# Patient Record
Sex: FEMALE | Race: BLACK OR AFRICAN AMERICAN | Employment: OTHER | URBAN - METROPOLITAN AREA
[De-identification: names, ages, dates, MRNs, and addresses within clinical notes are randomized per-mention and may not be internally consistent; named-entity substitution may affect disease eponyms.]

---

## 2017-09-07 ENCOUNTER — TELEPHONE (OUTPATIENT)
Dept: INTERNAL MEDICINE CLINIC | Facility: CLINIC | Age: 72
End: 2017-09-07

## 2017-09-07 NOTE — TELEPHONE ENCOUNTER
LOV with Dr. Ok Harry 2015 - Patient Outreach for 2017 requested patient contact our office to verify she still has [address]; advised we can remove from call list as well

## 2018-09-17 ENCOUNTER — TELEPHONE (OUTPATIENT)
Dept: INTERNAL MEDICINE CLINIC | Facility: CLINIC | Age: 73
End: 2018-09-17

## 2018-09-17 NOTE — TELEPHONE ENCOUNTER
Please place the referral and I will sign it. She will not receive any future referrals from me until she sees me.   TY

## 2018-09-17 NOTE — TELEPHONE ENCOUNTER
Patient called to schedule annual physical  Future Appointments   Date Time Provider Timothy Lopez   10/3/2018  1:45 PM Tiffany Sanabria MD EM[facility] 2015 with Dr. Kelly Abebe    Patient is requesting referral for cardiologist Dr. Jake Elias

## 2019-08-30 ENCOUNTER — TELEPHONE (OUTPATIENT)
Dept: INTERNAL MEDICINE CLINIC | Facility: CLINIC | Age: 74
End: 2019-08-30

## 2019-08-30 NOTE — TELEPHONE ENCOUNTER
LVM for pt to call office  Testing results received but pt hasnt been seen by Dr. Lennette Bosworth since 1/20/2015 -    is curious if pt has other PCP - if not,  would like her to be asked if she would like re-establish with her and schedule CPX.

## 2021-01-01 ENCOUNTER — EXTERNAL RECORD (OUTPATIENT)
Dept: HEALTH INFORMATION MANAGEMENT | Facility: OTHER | Age: 76
End: 2021-01-01

## 2021-03-12 DIAGNOSIS — Z23 NEED FOR VACCINATION: ICD-10-CM

## 2021-04-13 ENCOUNTER — APPOINTMENT (OUTPATIENT)
Dept: GENERAL RADIOLOGY | Facility: HOSPITAL | Age: 76
DRG: 286 | End: 2021-04-13
Attending: EMERGENCY MEDICINE
Payer: MEDICARE

## 2021-04-13 ENCOUNTER — HOSPITAL ENCOUNTER (INPATIENT)
Facility: HOSPITAL | Age: 76
LOS: 9 days | Discharge: HOME HEALTH | DRG: 286 | End: 2021-04-23
Attending: EMERGENCY MEDICINE | Admitting: INTERNAL MEDICINE
Payer: MEDICARE

## 2021-04-13 DIAGNOSIS — I48.11 LONGSTANDING PERSISTENT ATRIAL FIBRILLATION (HCC): ICD-10-CM

## 2021-04-13 DIAGNOSIS — E66.2 CLASS 2 OBESITY WITH ALVEOLAR HYPOVENTILATION, SERIOUS COMORBIDITY, AND BODY MASS INDEX (BMI) OF 35.0 TO 35.9 IN ADULT (HCC): ICD-10-CM

## 2021-04-13 DIAGNOSIS — R79.1 SUPRATHERAPEUTIC INR: ICD-10-CM

## 2021-04-13 DIAGNOSIS — I50.9 ACUTE ON CHRONIC CONGESTIVE HEART FAILURE, UNSPECIFIED HEART FAILURE TYPE (HCC): Primary | ICD-10-CM

## 2021-04-13 DIAGNOSIS — R06.00 DYSPNEA ON EXERTION: ICD-10-CM

## 2021-04-13 DIAGNOSIS — G47.33 OSA (OBSTRUCTIVE SLEEP APNEA): ICD-10-CM

## 2021-04-13 PROBLEM — E87.1 HYPONATREMIA: Status: ACTIVE | Noted: 2021-04-13

## 2021-04-13 PROBLEM — R73.9 HYPERGLYCEMIA: Status: ACTIVE | Noted: 2021-04-13

## 2021-04-13 PROCEDURE — 71045 X-RAY EXAM CHEST 1 VIEW: CPT | Performed by: EMERGENCY MEDICINE

## 2021-04-13 PROCEDURE — 99223 1ST HOSP IP/OBS HIGH 75: CPT | Performed by: INTERNAL MEDICINE

## 2021-04-13 RX ORDER — FUROSEMIDE 10 MG/ML
40 INJECTION INTRAMUSCULAR; INTRAVENOUS ONCE
Status: COMPLETED | OUTPATIENT
Start: 2021-04-13 | End: 2021-04-13

## 2021-04-13 RX ORDER — LISINOPRIL 20 MG/1
30 TABLET ORAL DAILY
Status: ON HOLD | COMMUNITY
End: 2021-04-23

## 2021-04-13 NOTE — ED INITIAL ASSESSMENT (HPI)
PT TO ED FOR ANABELA FEET SWELLING X 6 WEEKS. [facility] SENT TO ED FOR FURTHER EVAL. + SOB X 2 WEEKS WITH EXERTION , DENIES CP, HX OF AFIB, ON BLOOD THINNERS.

## 2021-04-14 ENCOUNTER — APPOINTMENT (OUTPATIENT)
Dept: CV DIAGNOSTICS | Facility: HOSPITAL | Age: 76
DRG: 286 | End: 2021-04-14
Attending: NURSE PRACTITIONER
Payer: MEDICARE

## 2021-04-14 PROBLEM — I50.9 ACUTE ON CHRONIC CONGESTIVE HEART FAILURE, UNSPECIFIED HEART FAILURE TYPE (HCC): Status: ACTIVE | Noted: 2021-04-14

## 2021-04-14 PROBLEM — R79.1 SUPRATHERAPEUTIC INR: Status: ACTIVE | Noted: 2021-04-14

## 2021-04-14 PROCEDURE — 93306 TTE W/DOPPLER COMPLETE: CPT | Performed by: NURSE PRACTITIONER

## 2021-04-14 PROCEDURE — 99232 SBSQ HOSP IP/OBS MODERATE 35: CPT | Performed by: STUDENT IN AN ORGANIZED HEALTH CARE EDUCATION/TRAINING PROGRAM

## 2021-04-14 RX ORDER — POTASSIUM CHLORIDE 20 MEQ/1
40 TABLET, EXTENDED RELEASE ORAL EVERY 4 HOURS
Status: COMPLETED | OUTPATIENT
Start: 2021-04-14 | End: 2021-04-14

## 2021-04-14 RX ORDER — CARVEDILOL 12.5 MG/1
25 TABLET ORAL 2 TIMES DAILY WITH MEALS
Status: DISCONTINUED | OUTPATIENT
Start: 2021-04-14 | End: 2021-04-23

## 2021-04-14 RX ORDER — ACETAMINOPHEN 325 MG/1
650 TABLET ORAL EVERY 6 HOURS PRN
Status: DISCONTINUED | OUTPATIENT
Start: 2021-04-14 | End: 2021-04-23

## 2021-04-14 RX ORDER — FUROSEMIDE 10 MG/ML
20 INJECTION INTRAMUSCULAR; INTRAVENOUS ONCE
Status: COMPLETED | OUTPATIENT
Start: 2021-04-14 | End: 2021-04-14

## 2021-04-14 RX ORDER — LORAZEPAM 0.5 MG/1
0.5 TABLET ORAL 2 TIMES DAILY PRN
Status: DISCONTINUED | OUTPATIENT
Start: 2021-04-14 | End: 2021-04-23

## 2021-04-14 RX ORDER — DIGOXIN 125 MCG
0.12 TABLET ORAL DAILY
Status: DISCONTINUED | OUTPATIENT
Start: 2021-04-14 | End: 2021-04-14

## 2021-04-14 RX ORDER — FUROSEMIDE 40 MG/1
40 TABLET ORAL
Status: DISCONTINUED | OUTPATIENT
Start: 2021-04-14 | End: 2021-04-14

## 2021-04-14 RX ORDER — MELATONIN
3 NIGHTLY PRN
Status: DISCONTINUED | OUTPATIENT
Start: 2021-04-14 | End: 2021-04-23

## 2021-04-14 RX ORDER — PANTOPRAZOLE SODIUM 40 MG/1
40 TABLET, DELAYED RELEASE ORAL
Status: DISCONTINUED | OUTPATIENT
Start: 2021-04-14 | End: 2021-04-23

## 2021-04-14 RX ORDER — ONDANSETRON 2 MG/ML
4 INJECTION INTRAMUSCULAR; INTRAVENOUS EVERY 4 HOURS PRN
Status: ACTIVE | OUTPATIENT
Start: 2021-04-14 | End: 2021-04-14

## 2021-04-14 NOTE — ED PROVIDER NOTES
Patient Seen in: [facility] Emergency Department      History   Patient presents with:  Difficulty Breathing    Stated Complaint: sob, hx of afib, denies palpitations, recent swelling feet, sats 98% room air    HPI/Subjective:   HPI    Patient is a 172.7 cm (5' 8\")   Wt 98 kg   LMP  (LMP Unknown)   SpO2 94%   BMI 32.84 kg/m²       Physical Exam    Vital signs noted. GENERAL: Patient is awake and alert, supine on the cart, in no apparent distress. HEENT: Head is without evidence of trauma.  Aline Lyons following orders were created for panel order CBC WITH DIFFERENTIAL WITH PLATELET.   Procedure                               Abnormality         Status                     ---------                               -----------         ------ recommended. Patient will be admitted to the service of the [facility]. Dr. Dia Drew notified. Cardiology to follow in consultation. Dr. Nelson Johnson notified.     Additional evaluation and intervention will be facilitated as needed, based on the lai

## 2021-04-14 NOTE — PROGRESS NOTES
[facility]  Progress Note     Marce Shipman Patient Status:  Inpatient    1945 MRN [id number]   [facility] 8NE-A Attending Lisa Barrow MD   Hosp Day # 0 PCP None Pcp     Chief Complaint:  Increasing SOB, LE edema   S:  H Epic.  ASSESSMENT / PLAN:   1. Acute right side heart failure   1.  lasix IV BID,    2. Echo  P   3. I/O, daily weight   4. Cardiology eval  Follows w/ Dr Alice Schaeffer    2. Atrial fibrillation on coumadin with supra therapeutic INR  1.  Resume home meds except cou

## 2021-04-14 NOTE — H&P
[facility]  History and Physical     Ginna Duckworth Patient Status:  Emergency    1945 MRN [id number]   Location [address] Attending No att. providers found   [facility] Day [facility] PCP None Pcp     Chief Complaint: SOB, mouth daily. , Disp: , Rfl:   hydrochlorothiazide (HYDRODIURIL) 25 MG Oral Tab, Take 25 mg by mouth daily. , Disp: , Rfl:   Warfarin Sodium (COUMADIN) 5 MG Oral Tab, 4 mg.  , Disp: , Rfl:   ramipril (ALTACE) 2.5 MG Oral Cap, Take 2.5 mg by mouth daily.  (Pa results for input(s): PCT in the last 168 hours. Cardiac  Recent Labs   Lab 04/13/21 2013   TROP <0.045   PBNP 3,305*       Creatinine Kinase  No results for input(s): CK in the last 168 hours.     Inflammatory Markers  No results for input(s): CRP, ROYCE

## 2021-04-14 NOTE — PLAN OF CARE
Received patient from ED. Patient oriented to room. AO x4. Calm, cooperative. Up ad maria. Afib with PVCs on cardiac monitor. Adequate saturation on room air. Lung sounds clear and diminished. Reports shortness of breath with exertion.  Denies chest discomfor

## 2021-04-14 NOTE — CONSULTS
[facility]    Report of Consultation    Vasquez Alejandro Patient Status:  Inpatient    1945 MRN [id number]   [facility] 8NE-A Attending Dk Hendrix MD   Hosp Day # 0 PCP None Pcp     Date of Admission:  2021  Date of Co (Diuretic)  •  LORazepam (ATIVAN) tab 0.5 mg, 0.5 mg, Oral, BID PRN  •  Pantoprazole Sodium (PROTONIX) EC tab 40 mg, 40 mg, Oral, QAM AC    Review of Systems:  All systems were reviewed and are negative except as described above in HPI.     Physical Exam: by (CST): Heather Velez MD on 4/13/2021 at 8:34 PM       Finalized by (CST): Heather Velez MD on 4/13/2021 at 8:34 PM            Labs:   Lab Results   Component Value Date    WBC 3.8 04/14/2021    RBC 4.78 04/14/2021    HGB 12.2 04/14/2021    HCT 40.7 unspecified heart failure type ([address]     Supratherapeutic INR      Elderly female with fluid overload wo any orthopnea, pnd; pointing more towards RHF.  Based on CXR worry about pericardial effusion and in the backdrop of her facial look with temple muscula

## 2021-04-14 NOTE — PLAN OF CARE
Pt A&O x 4, SPO2 96% on RA, A Fib with PVC on tele, up ad maria. Potassium replaced. Cardiology to see, APN notified. Maintained on PO Lasix, fluid restriction 1500 ml/day. Pt denies pain/chest pain/dyspnea at this time, will continue to monitor.      Problem replacement therapy as ordered  Outcome: Progressing

## 2021-04-15 PROCEDURE — 99223 1ST HOSP IP/OBS HIGH 75: CPT | Performed by: INTERNAL MEDICINE

## 2021-04-15 PROCEDURE — 99233 SBSQ HOSP IP/OBS HIGH 50: CPT | Performed by: STUDENT IN AN ORGANIZED HEALTH CARE EDUCATION/TRAINING PROGRAM

## 2021-04-15 RX ORDER — SIMETHICONE 80 MG
80 TABLET,CHEWABLE ORAL 4 TIMES DAILY PRN
Status: DISCONTINUED | OUTPATIENT
Start: 2021-04-15 | End: 2021-04-23

## 2021-04-15 RX ORDER — FUROSEMIDE 10 MG/ML
120 INJECTION INTRAMUSCULAR; INTRAVENOUS ONCE
Status: COMPLETED | OUTPATIENT
Start: 2021-04-15 | End: 2021-04-15

## 2021-04-15 RX ORDER — MAGNESIUM SULFATE HEPTAHYDRATE 40 MG/ML
2 INJECTION, SOLUTION INTRAVENOUS ONCE
Status: COMPLETED | OUTPATIENT
Start: 2021-04-15 | End: 2021-04-15

## 2021-04-15 RX ORDER — IPRATROPIUM BROMIDE AND ALBUTEROL SULFATE 2.5; .5 MG/3ML; MG/3ML
3 SOLUTION RESPIRATORY (INHALATION) EVERY 4 HOURS PRN
Status: DISCONTINUED | OUTPATIENT
Start: 2021-04-15 | End: 2021-04-23

## 2021-04-15 RX ORDER — ERGOCALCIFEROL 1.25 MG/1
50000 CAPSULE ORAL
Status: DISCONTINUED | OUTPATIENT
Start: 2021-04-15 | End: 2021-04-23

## 2021-04-15 RX ORDER — POTASSIUM CHLORIDE 20 MEQ/1
40 TABLET, EXTENDED RELEASE ORAL EVERY 4 HOURS
Status: COMPLETED | OUTPATIENT
Start: 2021-04-15 | End: 2021-04-15

## 2021-04-15 RX ORDER — EPLERENONE 25 MG/1
50 TABLET, FILM COATED ORAL DAILY
Status: DISCONTINUED | OUTPATIENT
Start: 2021-04-16 | End: 2021-04-23

## 2021-04-15 RX ORDER — IPRATROPIUM BROMIDE AND ALBUTEROL SULFATE 2.5; .5 MG/3ML; MG/3ML
3 SOLUTION RESPIRATORY (INHALATION)
Status: DISCONTINUED | OUTPATIENT
Start: 2021-04-15 | End: 2021-04-19

## 2021-04-15 RX ORDER — EPLERENONE 25 MG/1
25 TABLET, FILM COATED ORAL DAILY
Status: DISCONTINUED | OUTPATIENT
Start: 2021-04-15 | End: 2021-04-15

## 2021-04-15 NOTE — CONSULTS
[facility]  Report of Consultation    Zaid Reyna Patient Status:  Inpatient    1945 MRN [id number]   [facility] 8NE-A Attending Pablo Sanchez MD   Hosp Day # 1 PCP None Pcp     Reason for Consultation:  ALEXYS    History of P QAM AC  •  furosemide (LASIX) 250 mg in sodium chloride 0.9% 125 mL infusion, 10 mg/hr, Intravenous, Continuous    Review of Systems:   14 point ROS performed and is negative except as noted in HPI    Vital signs in last 24 hours:  Patient Vitals for the p 3.9 3.4*     --  101  --  101   CO2 28.0  --  32.0  --  33.0*   ALKPHO 84  --   --   --   --    AST 38*  --   --   --   --    ALT 22  --   --   --   --    BILT 1.4  --   --   --   --    TP 7.7  --   --   --   --     < > = values in this interval not

## 2021-04-15 NOTE — CONSULTS
[facility]  Advanced Heart Failure Consultation    Marce Shipman Patient Status:  Inpatient    1945 MRN [id number]   [facility] 8NE-A Attending Lisa Barrow MD   Hosp Day # 1 PCP None Pcp     Reason for Consultation:  Severe f Medical History:   Diagnosis Date   • Atrial fibrillation [facility])    • Unspecified essential hypertension      History reviewed. No pertinent surgical history.   Family History   Problem Relation Age of Onset   • Heart Disorder Father    • Cancer Sister distress. HEENT: No focal deficits. Arcus senilis; PERRLA  Neck: The patient has jugular venous distention to the angle of the jaw in the sitting position with large and variable V waves.   Cardiac: Irregularly irregular rhythm there is an S3 which is mos explained to the patient's children that her current right ventricular failure may have been developing over a relatively long time and that the first order of treatment is to diurese the patient as much as possible and achieve as euvolemic state as possib [address], [address] [facility], [address] [address] [place]  Phone: [phone]  Fax: [phone]  E-mail: [email or web address]    4/15/2021  2:57 PM

## 2021-04-15 NOTE — PROCEDURES
Spirometry Findings:  FEV1 is 0.77L, 39% predicted. FVC is 1.18L, 46% predicted. FEV1/ FVC ratio is 0.65. The flow-volume loop demonstrates an obstructive pattern. Impression:  Spirometry testing suggests both an obstructive and restrictive deficit.

## 2021-04-15 NOTE — PLAN OF CARE
Received bedside report on this Pt., at 1555. Pt., awake, A&Ox4, calm and cooperative. Pt., is in AFib on Tele monitor, sats greater than 92% on RA, denies any pain or distress. Pt., up to the BR with SBA, steady gait noted.  Continues on IV Lasix at 10mg/h

## 2021-04-15 NOTE — PAYOR COMM NOTE
--------------  ADMISSION REVIEW     Payor: Ross Navarro Langlois #:  [id number]  Authorization Number: [id number]    Admit date: 4/14/21  Admit time:  1:08 AM     4/13 Patient Seen in: [facility] Emergency Department    History   Shruthi LUNGS: Lungs are diminished, respirations are unlabored. HEART: Irregularly irregular. There are no rubs or gallops. ABDOMEN: The abdomen is soft, nontender. Bowel sounds present. SKIN: Skin is warm and dry. There are no rashes.    EXTREMITIES: The [facility]. Joaquim Coronado notified. Cardiology to follow in consultation. Dr. Adwoa Palacio notified.     Disposition and Plan   Clinical Impression:  Acute on chronic congestive heart failure, unspecified heart failure typ acute distress. Alert and oriented x 3. SOB with talking   HEENT: Normocephalic atraumatic. Moist mucous membranes. EOM-I. PERRLA. Anicteric. Neck: No lymphadenopathy. No JVD. No carotid bruits. Respiratory: Clear to auscultation bilaterally. No wheezes. vomiting. Feeling significantly improved since arrival after getting diuretics. Daughter at bedside. Known to have chronic A Fib with well controlled heart rate, Htn and moderate obesity.      Current Facility-Administered Medications:   •  carvedilol (CO 4/14/2021 05:32   Cholesterol, Total Latest Ref Range: <200 mg/dL 99   Triglycerides Latest Ref Range: 30 - 149 mg/dL 65   HDL Cholesterol Latest Ref Range: 40 - 59 mg/dL 38 (L)   VLDL Latest Ref Range: 0 - 30 mg/dL 13   NON HDL CHOL Latest Ref Range: <130 3800 ml   Net -3560 ml       Physical Exam:  Blood pressure 102/52, pulse 86, temperature 97.5 °F (36.4 °C), temperature source Oral, resp. rate 18, height 5' 8\" (1.727 m), weight 235 lb 8 oz (106.8 kg), SpO2 96 %.   General: Alert and oriented in no appar  a left pleural effusion.        Lab 04/13/21 2013 04/14/21  1145 04/15/21  0520   WBC 4.3 3.8* 3.7*   HGB 13.6 12.2 12.2   HCT 43.4 40.7 39.8   .0 201.0 178.0      *  --  80  --  87   BUN 16  --  16  --  16   CREATSERUM 0.94  --  1.06*  -- warfarin; INR >7 sec to congested liver. Vit k to bring INR down. Digoxin stopped on admission  H/o Htn  Moderate obesity; ALEXYS protocol ordered by HF consultant.  Also pulm consulted       HOSPITALIST:     :  Feeling better this am- feels less bloated and l RHC when vol status is imporved as still has signif vol overload . Cont education HF   Will need PCP add SW      Estimated date of discharge:  Few days   Discharge is dependent on:  Clinical w/u   At this point Ms. Oswaldo Gambino is expected to be discharge to:

## 2021-04-15 NOTE — PLAN OF CARE
Assumed care at 1900. Pt resting in bed. A&Ox4. Denies pain & sob. O2 sat WNL on RA. A.fib on tele w/ PVCs. Coumadin on hold, INR draw for morning. Lasix gtt infusing at 5mL/hr or 10mg/hr. Good urine output. 2+ edema noted on bilateral lower extremities.  Cayla Denton threatening arrhythmias  - Monitor electrolytes and administer replacement therapy as ordered  Outcome: Progressing

## 2021-04-15 NOTE — PROGRESS NOTES
[facility]  Progress Note     Elena Kamara Patient Status:  Inpatient    1945 MRN [id number]   [facility] 8NE-A Attending Chuy Mcfarland MD   Hosp Day # 1 PCP None Pcp     Chief Complaint:  Increasing SOB, LE edema   S:  F Clearance: 41.7 mL/min (A) (based on SCr of 1.14 mg/dL (H)).   Recent Labs   Lab 04/13/21 2013 04/14/21  0835 04/15/21  0520   PTP 61.4* 63.9* 66.6*   INR 6.95* 7.31* 7.71*     Recent Labs   Lab 04/13/21 2013   TROP <0.045        Imaging: Imaging data rev reviewed  Supratherapeutic INR- no anemia on labs, continue to hold coumadin  Sujit Gomez MD

## 2021-04-15 NOTE — CM/SW NOTE
Met with patient, with daughter and son at bedside to discuss discharge planning per CHF protocol. Patient shared that she resides in a single family senior independent living community in [facility].   Her daughter is about 5 minutes from her mom; son re

## 2021-04-15 NOTE — PROGRESS NOTES
[facility]    Progress Note    Dejan Enrique Patient Status:  Inpatient    1945 MRN [id number]   [facility] 8NE-A Attending Karol Ybarra MD   Hosp Day # 1 PCP None Pcp     Subjective:  No chest pain or shortness of breath.   Fa technically       sufficient to allow evaluation of LV diastolic function. 2.  Ventricular septum: The contour showed diastolic flattening. 3.  Aortic valve: Mild regurgitation. 4.  Mitral valve: Mildly calcified annulus.  Mitral valve demonstrates mi 3.9 3.4 (L)   Chloride Latest Ref Range: 98 - 112 mmol/L 101 101    101   Carbon Dioxide, Total Latest Ref Range: 21.0 - 32.0 mmol/L 28.0 32.0    33.0 (H)   BUN Latest Ref Range: 7 - 18 mg/dL 16 16    16   CREATININE Latest Ref Range: 0.55 - 1.02 mg/dL 0.9

## 2021-04-15 NOTE — PLAN OF CARE
Assume pt care around 0730. Pt denies CP, SOB, dizziness, or lightheadedness. Pt up w/ SBA, continent of b/b. Pt education provided on medication, heart failure,and POC. Pt verbalized understanding. All needs met. Will continue to monitor.   POC: lasix gtt, arrhythmias  - Monitor electrolytes and administer replacement therapy as ordered  Outcome: Progressing

## 2021-04-16 PROCEDURE — 99232 SBSQ HOSP IP/OBS MODERATE 35: CPT | Performed by: STUDENT IN AN ORGANIZED HEALTH CARE EDUCATION/TRAINING PROGRAM

## 2021-04-16 PROCEDURE — 99232 SBSQ HOSP IP/OBS MODERATE 35: CPT | Performed by: NURSE PRACTITIONER

## 2021-04-16 RX ORDER — POTASSIUM CHLORIDE 20 MEQ/1
40 TABLET, EXTENDED RELEASE ORAL ONCE
Status: COMPLETED | OUTPATIENT
Start: 2021-04-16 | End: 2021-04-16

## 2021-04-16 RX ORDER — DIGOXIN 125 MCG
125 TABLET ORAL EVERY OTHER DAY
Status: DISCONTINUED | OUTPATIENT
Start: 2021-04-17 | End: 2021-04-23

## 2021-04-16 RX ORDER — WARFARIN SODIUM 2 MG/1
2 TABLET ORAL
Status: COMPLETED | OUTPATIENT
Start: 2021-04-16 | End: 2021-04-16

## 2021-04-16 NOTE — PROGRESS NOTES
[facility]    Progress Note    Vasquez Alejandro Patient Status:  Inpatient    1945 MRN [id number]   [facility] 8NE-A Attending Dk Hendrix MD   Hosp Day # 2 PCP None Pcp     Subjective:  \" I feel 80% better \"  Abdomen feels le not technically       sufficient to allow evaluation of LV diastolic function. 2.  Ventricular septum: The contour showed diastolic flattening. 3.  Aortic valve: Mild regurgitation. 4.  Mitral valve: Mildly calcified annulus.  Mitral valve demonstrate mmol/L 34.0 (H)   BUN Latest Ref Range: 7 - 18 mg/dL 16   CREATININE Latest Ref Range: 0.55 - 1.02 mg/dL 1.19 (H)         Medications:  [START ON 4/17/2021] digoxin (LANOXIN) tab 125 mcg, 125 mcg, Oral, QOD  ergocalciferol (DRISDOL/VITAMIN D2) cap 50,000 U

## 2021-04-16 NOTE — CDS QUERY
Heart Failure  CLINICAL [place]    Dear Doctor:  Clinical information (provided below) includes a diagnosis of Acute Right Heart Failure.  For accurate ICD-10-CM code assignment to reflect severity of illness and risk of mortality, Thank you!                                                            THIS FORM IS A PERMANENT PART OF THE MEDICAL RECORD

## 2021-04-16 NOTE — PROGRESS NOTES
[facility]  Progress Note     Joey Dunn Patient Status:  Inpatient    1945 MRN [id number]   [facility] 8NE-A Attending Devika Avalos MD   Hosp Day # 2 PCP None Pcp     Chief Complaint:  Increasing SOB, LE edema   S: --  21   BILT 1.4  --   --   --  2.0   TP 7.7  --   --   --  7.1    < > = values in this interval not displayed. Estimated Creatinine Clearance: 39.9 mL/min (A) (based on SCr of 1.19 mg/dL (H)).   Recent Labs   Lab 04/14/21  0835 04/15/21  0520 04/16/21 hold  ? Resume dig at lower dose  Reviewed plans w/ pt/ son several times- pt needs explanation repeatedly   Cont HF education    Quality:  · DVT Prophylaxis: INR 2.38  Given Vit k  Thurs   · CODE status: full  · Bridges: none  · If COVID testing is negative

## 2021-04-16 NOTE — CM/SW NOTE
Per nurse, patient now receptive to [facility] @ discharge. Order obtained;  [facility] referrals sent

## 2021-04-16 NOTE — PROGRESS NOTES
[facility] Pulmonary/Critical Care Progress Note     SUBJECTIVE/Interval history: All events, procedures, notes reviewed. No acute events overnight, she feels 'great' today, improving dyspnea. No cough, pain.  afebrile GFRNAA 46*  --  41* 44*  44*  --    CA 9.2  --  9.3 9.5  9.5  --      --  137 138  138  --    K 3.4*   < > 3.9 3.7  3.7 3.8     --  100 99  99  --    CO2 33.0*  --  32.0 34.0*  34.0*  --     < > = values in this interval not displayed.      Re eplerenone  50 mg Oral Daily   • ipratropium-albuterol  3 mL Nebulization QID   • carvedilol  25 mg Oral BID with meals   • lisinopril  30 mg Oral Daily   • Pantoprazole Sodium  40 mg Oral QAM AC     simethicone, ipratropium-albuterol, acetaminophen, venu

## 2021-04-16 NOTE — PLAN OF CARE
Patient aox3, vss, ra, Afib controlled. lungs diminished, afib controlled, INR 2.38. BM today. Lasix gtt 10 mg/hr=5cc/hr. Voiding.  +4 edema to bilateral legs. Up ad maria.    Discussed with son and patient about the importance of daily weights, 64 oz FR electrolytes and administer replacement therapy as ordered  Outcome: Progressing     Problem: GASTROINTESTINAL - ADULT  Goal: Minimal or absence of nausea and vomiting  Description: INTERVENTIONS:  - Maintain adequate hydration with IV or PO as ordered and hematologic stability  Description: INTERVENTIONS  - Assess for signs and symptoms of bleeding or hemorrhage  - Monitor labs and vital signs for trends  - Administer supportive blood products/factors, fluids and medications as ordered and appropriate  - Ad

## 2021-04-16 NOTE — PROGRESS NOTES
Pharmacy was consulted to dose Coumadin by Dr. Peterson Sandhu. However warfarin order was placed by Ray LEE. Pharmacy will be signing off. Please re-consult if needed.     Hugh Roberson, PharmD  4/16/2021  4:58 PM

## 2021-04-16 NOTE — PROGRESS NOTES
· [facility]      Subjective:  Dyspnea improving, abdominal distention improving, LE edema persists    Objective:  /52 (BP Location: Left arm)   Pulse 76   Temp 97.8 °F (36.6 °C) (Oral)   Resp 18   Ht 5' 8\" (1.727 m)   Wt 227 lb 1.2 oz < >  --   --  3.8*  --  3.7*  --  4.5   .0   < >  --   --  201.0  --  178.0  --  188.0   *   < >   < >  --   --   --  87 102* 95  95   BUN 16   < >   < >  --   --   --  16 17 16  16   CREATSERUM 0.94   < >   < >  --   --   --  1.14* 1.26* 1.1

## 2021-04-16 NOTE — PLAN OF CARE
Patient alert  And oriented x4  on room air  ,o2sat above 93% , denies any chest pain /chest discomfort , stomach upset and emesis x1 with undigested food , simethicone started and helped , on lasix gtt  5ml/hr infusing  , potassium dropped to 3.7 and repl Absence of cardiac arrhythmias or at baseline  Description: INTERVENTIONS:  - Continuous cardiac monitoring, monitor vital signs, obtain 12 lead EKG if indicated  - Evaluate effectiveness of antiarrhythmic and heart rate control medications as ordered  - I INTERVENTIONS  - Assess and document risk factors for pressure ulcer development  - Assess and document skin integrity  - Monitor for areas of redness and/or skin breakdown  - Initiate interventions, skin care algorithm/standards of care as needed  Outcome

## 2021-04-16 NOTE — DIETARY NOTE
[facility]     Admitting diagnosis:  Supratherapeutic INR [R79.1]  Acute on chronic congestive heart failure, unspecified heart failure type ([facility] [address] [I50.9]    Ht: 172.7 cm (5' 8\")  Wt: 103 kg (227 lb 1.2 oz).

## 2021-04-17 PROCEDURE — 99233 SBSQ HOSP IP/OBS HIGH 50: CPT | Performed by: STUDENT IN AN ORGANIZED HEALTH CARE EDUCATION/TRAINING PROGRAM

## 2021-04-17 RX ORDER — WARFARIN SODIUM 2 MG/1
2 TABLET ORAL
Status: COMPLETED | OUTPATIENT
Start: 2021-04-17 | End: 2021-04-17

## 2021-04-17 RX ORDER — POTASSIUM CHLORIDE 20 MEQ/1
40 TABLET, EXTENDED RELEASE ORAL EVERY 4 HOURS
Status: COMPLETED | OUTPATIENT
Start: 2021-04-17 | End: 2021-04-17

## 2021-04-17 NOTE — PROGRESS NOTES
04/17/21 0305 04/17/21 0306 04/17/21 0307   Oxygen Therapy   SpO2 (!) 89 % (!) 87 % (!) 89 %      04/17/21 0308 04/17/21 0309 04/17/21 0310   Oxygen Therapy   SpO2 (!) 89 % (!) 88 % (!) 88 %      04/17/21 0311 04/17/21 0312 04/17/21 0313   Oxygen Therap

## 2021-04-17 NOTE — PROGRESS NOTES
Pharmacy Dosing Service  Warfarin (Coumadin) Initial Dosing         Marvel Sterling is a 66year old female for whom pharmacy has been consulted to dose warfarin for  Hx of Afib . Pharmacy has been asked to dose warfarin by Dr. Radha Calvin.      Based on thi

## 2021-04-17 NOTE — PLAN OF CARE
Assumed care of patient at 1. Alert and oriented x4, No C/O chest pain or SOB, SPO2 on RA 92%, Heart rate A fib 70s. Breath sounds clear. Lasix gtt infusing per orders. Bed is locked and in low position. Personal belonging within reach.  Updated on plan medications as ordered  - Initiate emergency measures for life threatening arrhythmias  - Monitor electrolytes and administer replacement therapy as ordered  Outcome: Progressing     Problem: GASTROINTESTINAL - ADULT  Goal: Minimal or absence of nausea and of care as needed  Outcome: Progressing     Problem: HEMATOLOGIC - ADULT  Goal: Maintains hematologic stability  Description: INTERVENTIONS  - Assess for signs and symptoms of bleeding or hemorrhage  - Monitor labs and vital signs for trends  - Administer

## 2021-04-17 NOTE — PROGRESS NOTES
[facility]  Progress Note     Cletis Blunt Patient Status:  Inpatient    1945 MRN [id number]   [facility] 8NE-A Attending Deepak Barth MD   Hosp Day # 3 PCP None Pcp     Chief Complaint: Dyspnea     S: Patient  Feeling mar 99  99  --   --  97* 98   CO2 28.0   < > 34.0*  34.0*  --   --  39.0* 36.0*   ALKPHO 84  --  76  --   --   --   --    AST 38*  --  34  --   --   --   --    ALT 22  --  21  --   --   --   --    BILT 1.4  --  2.0  --   --   --   --    TP 7.7  --  7.1  --   - Plan of care discussed with pt, RN     Noé Goetz MD          **Certification      PHYSICIAN Certification of Need for Inpatient Hospitalization - Initial Certification    Patient will require inpatient services that will reasonably be expected to s

## 2021-04-17 NOTE — PROGRESS NOTES
[facility]  Cardiology Progress Note    Ginna Duckworth Patient Status:  Inpatient    1945 MRN [id number]   [facility] 8NE-A Attending Rogelio Gentile MD   Hosp Day # 3 PCP None Pcp     Subjective:  No chest pain ;  shortness of br BUN 17 04/17/2021     04/17/2021    K 3.4 04/17/2021    CL 98 04/17/2021    CO2 36.0 04/17/2021    GLU 79 04/17/2021    CA 9.8 04/17/2021    INR 1.96 04/17/2021    PTP 22.8 04/17/2021    MG 1.8 04/17/2021       Allergies:  No Known Allergies    Me

## 2021-04-17 NOTE — PLAN OF CARE
Patient is alert and oriented x4. Afibb on tele, rate controlled. Oxygenation is 100% on RA. L sounds clear. VSS. Patient denies chest pain, shortness of breath, palpitations. Denies pain. BLE +1. Patient is resting comfortably in bed at this time.     POC: and heart rate control medications as ordered  - Initiate emergency measures for life threatening arrhythmias  - Monitor electrolytes and administer replacement therapy as ordered  Outcome: Progressing     Problem: GASTROINTESTINAL - ADULT  Goal: Minimal o care algorithm/standards of care as needed  Outcome: Progressing     Problem: HEMATOLOGIC - ADULT  Goal: Maintains hematologic stability  Description: INTERVENTIONS  - Assess for signs and symptoms of bleeding or hemorrhage  - Monitor labs and vital signs

## 2021-04-18 PROCEDURE — 99232 SBSQ HOSP IP/OBS MODERATE 35: CPT | Performed by: STUDENT IN AN ORGANIZED HEALTH CARE EDUCATION/TRAINING PROGRAM

## 2021-04-18 RX ORDER — POTASSIUM CHLORIDE 20 MEQ/1
40 TABLET, EXTENDED RELEASE ORAL EVERY 4 HOURS
Status: DISCONTINUED | OUTPATIENT
Start: 2021-04-18 | End: 2021-04-18

## 2021-04-18 RX ORDER — POTASSIUM CHLORIDE 20 MEQ/1
40 TABLET, EXTENDED RELEASE ORAL EVERY 4 HOURS
Status: COMPLETED | OUTPATIENT
Start: 2021-04-18 | End: 2021-04-19

## 2021-04-18 RX ORDER — POTASSIUM CHLORIDE 20 MEQ/1
40 TABLET, EXTENDED RELEASE ORAL ONCE
Status: COMPLETED | OUTPATIENT
Start: 2021-04-18 | End: 2021-04-18

## 2021-04-18 RX ORDER — WARFARIN SODIUM 2 MG/1
2 TABLET ORAL
Status: COMPLETED | OUTPATIENT
Start: 2021-04-18 | End: 2021-04-18

## 2021-04-18 NOTE — PROGRESS NOTES
Pharmacy Dosing Service  Warfarin (Coumadin) Subsequent Dosing         Carter Gutierrez is a 66year old female for whom pharmacy has been dosing warfarin.      Consulting physician: Dr Gabo Petersen    Indication: Hx of Afib    Goal INR is 2-3      Recent Labs

## 2021-04-18 NOTE — PROGRESS NOTES
[facility]  Progress Note     Ben Hamlin Patient Status:  Inpatient    1945 MRN [id number]   [facility] 8NE-A Attending Tila Emmanuel MD   Hosp Day # 4 PCP None Pcp     Chief Complaint: Dyspnea     S: Patient continues to 9.9 9.6   ALB 3.6  --  3.6  --   --   --   --    *   < > 138  138   < > 141 140 140   K 3.8   < > 3.7  3.7   < > 3.4* 3.9 3.7      < > 99  99   < > 98 95* 95*   CO2 28.0   < > 34.0*  34.0*   < > 36.0* 39.0* 38.0*   [address] 84  --  76  --   -- Will the patient be referred to [facility] on discharge?: no  Estimated date of discharge: tbd  Discharge is dependent on: clinical   At this point Ms. Leisa Denton is expected to be discharge to: home     Plan of care discussed with pt, RN     Olga Hobbs MD

## 2021-04-18 NOTE — PLAN OF CARE
Patient is alert and oriented x4. Afibb on tele, rate controlled. Oxygenation is 100% on RA. L sounds clear. VSS. Patient denies chest pain, shortness of breath, palpitations. Denies pain. BLE +2,worse than yesterday.  But overnight two nights ago, she wor obtain 12 lead EKG if indicated  - Evaluate effectiveness of antiarrhythmic and heart rate control medications as ordered  - Initiate emergency measures for life threatening arrhythmias  - Monitor electrolytes and administer replacement therapy as ordered for areas of redness and/or skin breakdown  - Initiate interventions, skin care algorithm/standards of care as needed  Outcome: Progressing     Problem: HEMATOLOGIC - ADULT  Goal: Maintains hematologic stability  Description: INTERVENTIONS  - Assess for si

## 2021-04-19 PROCEDURE — 99233 SBSQ HOSP IP/OBS HIGH 50: CPT | Performed by: STUDENT IN AN ORGANIZED HEALTH CARE EDUCATION/TRAINING PROGRAM

## 2021-04-19 PROCEDURE — 99232 SBSQ HOSP IP/OBS MODERATE 35: CPT | Performed by: INTERNAL MEDICINE

## 2021-04-19 RX ORDER — WARFARIN SODIUM 2 MG/1
2 TABLET ORAL
Status: COMPLETED | OUTPATIENT
Start: 2021-04-19 | End: 2021-04-19

## 2021-04-19 RX ORDER — POTASSIUM CHLORIDE 20 MEQ/1
40 TABLET, EXTENDED RELEASE ORAL ONCE
Status: COMPLETED | OUTPATIENT
Start: 2021-04-19 | End: 2021-04-19

## 2021-04-19 NOTE — PROGRESS NOTES
[facility] Pulmonary/Critical Care Progress Note     SUBJECTIVE/24H Events: All events, procedures, notes reviewed. Feeling better. Diuresing. Breathing easier.   States family noticed apneas in the past.      Review 25.5* 24.4* 23.7*   MCHC 31.3   < > 30.0* 30.7* 30.0*   RDW 15.8*   < > 16.7* 15.4* 15.3*   NEPRELIM 2.85  --   --   --  2.83   WBC 4.3   < > 3.8* 3.7* 4.5   .0   < > 201.0 178.0 188.0    < > = values in this interval not displayed.      No results f

## 2021-04-19 NOTE — CM/SW NOTE
Care Progression Note:  Active Acute Medical Issue:   Acute on chronic congestive heart failure, unspecified heart failure type [facility])     Other Contributing Medical Factors/Dx. Jami Patel  acute right sided heart failure, pulm HTN, afib    Length of stay: 5  GMLOS:

## 2021-04-19 NOTE — PROGRESS NOTES
[facility] Dosing Service  Warfarin (Coumadin) Subsequent Dosing    Ginna Duckworth is a 66year old patient for whom pharmacy is dosing warfarin (Coumadin).  Goal INR is 2-3    Recent Labs   Lab 04/15/21  0520 04/16/21  0433 04/17/21  0503 04/18/21  051

## 2021-04-19 NOTE — PLAN OF CARE
Assumed care of patient around 1. Alert and oriented x4. Denies any chest pain, lightheadedness or shortness of breath. On room air. A fib with PVCs on tele. +2 edema in lower extremities. Voids frequently d/t lasix drip. Lasix drip infusing at 5ml/hr. control medications as ordered  - Initiate emergency measures for life threatening arrhythmias  - Monitor electrolytes and administer replacement therapy as ordered  Outcome: Progressing     Problem: GASTROINTESTINAL - ADULT  Goal: Minimal or absence of na algorithm/standards of care as needed  Outcome: Progressing

## 2021-04-19 NOTE — PROGRESS NOTES
[facility]  Advanced Heart Failure Progress Note    Cammie Burrows Patient Status:  Inpatient    1945 MRN [id number]   [facility] 8NE-A Attending Gurinder Bar MD   Hosp Day # 5 PCP None Pcp     Subjective:   The patient states t Labs:     Lab Results   Component Value Date    INR 2.09 (H) 04/19/2021    INR 1.99 (H) 04/18/2021    INR 1.96 (H) 04/17/2021     No results for input(s): BNPML in the last 168 hours.   BUN (mg/dL)   Date Value   04/19/2021 18   04/18/2021 18   04/18/20 Failure Research, [facility] Director, [address]  Ernesto Santana [address], [address] [place], [address] [address] [place]  Phone: [phone]  Fax: [phone]

## 2021-04-19 NOTE — PROGRESS NOTES
[facility]  Progress Note     Xi Born Patient Status:  Inpatient    1945 MRN [id number]   [facility] 8NE-A Attending Jolie Saunders MD   Hosp Day # 5 PCP None Pcp     Chief Complaint: Dyspnea     S: Patient no issues th ALB 3.6  --  3.6  --   --   --   --    *   < > 138  138   < > 140 137 138   K 3.8   < > 3.7  3.7   < > 3.7 3.6 3.8      < > 99  99   < > 95* 95* 95*   CO2 28.0   < > 34.0*  34.0*   < > 38.0* 39.0* 38.0*   SHANTAPHO 84  --  76  --   --   --   -- status: full  · Bridges: no  · Central line: no  · If COVID testing is negative, may discontinue isolation: yes      Will the patient be referred to TCC on discharge?: no  Estimated date of discharge: tbd  Discharge is dependent on: clinical   At this point

## 2021-04-20 PROCEDURE — 99232 SBSQ HOSP IP/OBS MODERATE 35: CPT | Performed by: INTERNAL MEDICINE

## 2021-04-20 PROCEDURE — 99232 SBSQ HOSP IP/OBS MODERATE 35: CPT | Performed by: STUDENT IN AN ORGANIZED HEALTH CARE EDUCATION/TRAINING PROGRAM

## 2021-04-20 RX ORDER — POTASSIUM CHLORIDE 20 MEQ/1
40 TABLET, EXTENDED RELEASE ORAL EVERY 4 HOURS
Status: COMPLETED | OUTPATIENT
Start: 2021-04-20 | End: 2021-04-21

## 2021-04-20 RX ORDER — WARFARIN SODIUM 2 MG/1
2 TABLET ORAL
Status: COMPLETED | OUTPATIENT
Start: 2021-04-20 | End: 2021-04-20

## 2021-04-20 NOTE — PLAN OF CARE
Assumed pt care at 0730. A&Ox4. RA, denies chest pain/SOB, lung sounds clear, VSS, afib on tele. Voids. Up ad maria. Pressure ulcer to toe open to air. POC lasix gtt, venofer, eventual r/lhc.  POC updated with pt and family, questions answered, verbalized und for life threatening arrhythmias  - Monitor electrolytes and administer replacement therapy as ordered  Outcome: Progressing     Problem: GASTROINTESTINAL - ADULT  Goal: Minimal or absence of nausea and vomiting  Description: INTERVENTIONS:  - Maintain yolanda HEMATOLOGIC - ADULT  Goal: Maintains hematologic stability  Description: INTERVENTIONS  - Assess for signs and symptoms of bleeding or hemorrhage  - Monitor labs and vital signs for trends  - Administer supportive blood products/factors, fluids and medicat

## 2021-04-20 NOTE — PLAN OF CARE
A/o x4, afib on tele. Within normal limits on room air. Denies any pain or shortness breath at this time. Lasix drip infusing per orders. Plan of care discussed with pt, verbalized understanding. Call light within reach.        Problem: Patient/Family Goals replacement therapy as ordered  Outcome: Progressing     Problem: GASTROINTESTINAL - ADULT  Goal: Minimal or absence of nausea and vomiting  Description: INTERVENTIONS:  - Maintain adequate hydration with IV or PO as ordered and tolerated  - Nasogastric tu INTERVENTIONS  - Assess for signs and symptoms of bleeding or hemorrhage  - Monitor labs and vital signs for trends  - Administer supportive blood products/factors, fluids and medications as ordered and appropriate  - Administer supportive blood products/f

## 2021-04-20 NOTE — PROGRESS NOTES
[facility]  Advanced Heart Failure Progress Note    Sundeep Benitez Patient Status:  Inpatient    1945 MRN [id number]   [facility] 8NE-A Attending Savana Alvarenga MD   Hosp Day # 6 PCP None Pcp     Subjective:  Feels better and she is still 3+ edema particularly in the dependent areas of the patient's body and lower extremities. Peripheral pulses are 2+. Neurologic: Alert and oriented, normal affect. Skin: Warm and dry.        Labs:     Lab Results   Component Value Date    INR 2.2 the daughter that I predict she will require 2-3 more days of diuresis before we can consider right and left heart catheterization.   I am hoping that this can be done before the end of the week and that we can start guideline directed medical therapy on a

## 2021-04-20 NOTE — PROGRESS NOTES
[facility]  Progress Note     Soheila Paige Patient Status:  Inpatient    1945 MRN [id number]   [facility] 8NE-A Attending Bartolo Jesus MD   Hosp Day # 6 PCP None Pcp     Chief Complaint: Dyspnea     S: Patient no issues ov < > 44*  44*   < > 33* 29* 33*   CA 9.2   < > 9.5  9.5   < > 9.9 9.8 9.7   ALB 3.6  --  3.6  --   --   --   --    *   < > 138  138   < > 138 137 135*   K 3.8   < > 3.7  3.7   < > 3.8 4.0 3.7      < > 99  99   < > 95* 96* 96*   CO2 28.0   < > 34 lasix gtt   venofer daily   Cr 1.53  Stable   Wt down another 8 lbs  Neg 21 liters dluid balance    Cont HF education     Quality:  · DVT Prophylaxis: Counadin  · CODE status: full  · Bridges: no  · Central line: no  · If COVID testing is negative, may disco

## 2021-04-20 NOTE — PLAN OF CARE
Assumed pt care at 0730. A&Ox4. RA, denies chest pain/SOB, lung sounds clear, VSS, afib on tele. Voids. Up ad maria. Pressure ulcer to toe open to air.  R wrist IV site swollen and causing discomfort--removed during prior shift-- applied ice packs and spoke w signs, obtain 12 lead EKG if indicated  - Evaluate effectiveness of antiarrhythmic and heart rate control medications as ordered  - Initiate emergency measures for life threatening arrhythmias  - Monitor electrolytes and administer replacement therapy as o Monitor for areas of redness and/or skin breakdown  - Initiate interventions, skin care algorithm/standards of care as needed  Outcome: Progressing     Problem: HEMATOLOGIC - ADULT  Goal: Maintains hematologic stability  Description: INTERVENTIONS  - Asses

## 2021-04-21 PROCEDURE — 99232 SBSQ HOSP IP/OBS MODERATE 35: CPT | Performed by: INTERNAL MEDICINE

## 2021-04-21 PROCEDURE — 99233 SBSQ HOSP IP/OBS HIGH 50: CPT | Performed by: INTERNAL MEDICINE

## 2021-04-21 RX ORDER — WARFARIN SODIUM 2 MG/1
2 TABLET ORAL
Status: DISCONTINUED | OUTPATIENT
Start: 2021-04-21 | End: 2021-04-21

## 2021-04-21 NOTE — PLAN OF CARE
Assumed care of patient at [address]. Patient AOX4. Oxygenating >90% on RA. A-fib on tele. Rates controlled. On Coumadin. VSS. Denies pain. IV Lasix gtt infusing per orders. CHF protocol in place. Plan for left and right heart cath once patient is euvolemic.  Pa Michael Bean RN  Outcome: Progressing  Goal: Absence of cardiac arrhythmias or at baseline  Description: INTERVENTIONS:  - Continuous cardiac monitoring, monitor vital signs, obtain 12 lead EKG if indicated  - Evaluate effectiveness of antiarrhythmi Administer electrolyte replacement as ordered  - Monitor response to electrolyte replacements, including rhythm and repeat lab results as appropriate  - Fluid restriction as ordered  - Instruct patient on fluid and nutrition restrictions as appropriate  4/

## 2021-04-21 NOTE — PROGRESS NOTES
[facility]  Cardiology Progress Note    Marvel Sterling Patient Status:  Inpatient    1945 MRN [id number]   [facility] 8NE-A Attending Angelito Marc MD   Hosp Day # 7 PCP None Pcp     Subjective:  Daughter at bedside.  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 INR is 2.2 on Coumadin for chronic atrial fibrillation.   Hemoglobin 12.2 with platelet count of 1

## 2021-04-21 NOTE — PROGRESS NOTES
[facility] Dosing Service  Warfarin (Coumadin) Subsequent Dosing    Rosalia Brennan is a 66year old patient for whom pharmacy is dosing warfarin (Coumadin).  Goal INR is 2-3    Recent Labs   Lab 04/17/21  0503 04/18/21  0516 04/19/21  0448 04/20/21  060

## 2021-04-21 NOTE — PROGRESS NOTES
[facility]  Progress Note    Ben Hamlin Patient Status:  Inpatient    1945 MRN [id number]   [facility] 8NE-A Attending Shu Del Rosario MD   [facility] Day # 7 PCP None Pcp     Subjective:  Ben aHmlin is a(n) 66year old fem PHOSPHORUS     Recent Labs   Lab 04/19/21  0448 04/20/21  0608 04/21/21  0551   INR 2.09* 2.23* 2.19*       No results for input(s): ABGPHT, YZBCNT8G, FBIKU9M, ABGHCO3, ABGBE, TEMP, FRANKI, SITE, DEV, THGB in the last 168 hours.     Invalid input(s): ORC28FO status, remains stable on RA  · Recommend sleep study and complete PFTs as outpatient  · Nebs prn  · Mobilize as able, pulm toilet  · Prophylaxis- protonix, coumadin  · Disp- floor. No acute pulm issues, will follow peripherally at this time.   Please cont

## 2021-04-21 NOTE — PLAN OF CARE
Assumed care this am.  Lasix gtt infusing at 10mg/hr. Decreased to 5mg/hr this afternoon per Dr. [place] Feeling order. Creat 1.87. Chronic AF on tele. Holding coumadin for cardiac cath, most likely Friday but possibly tomorrow.   Pt will be started on Hepari edema, trend weights  Outcome: Progressing  Goal: Absence of cardiac arrhythmias or at baseline  Description: INTERVENTIONS:  - Continuous cardiac monitoring, monitor vital signs, obtain 12 lead EKG if indicated  - Evaluate effectiveness of antiarrhythmic ADULT  Goal: Skin integrity remains intact  Description: INTERVENTIONS  - Assess and document risk factors for pressure ulcer development  - Assess and document skin integrity  - Monitor for areas of redness and/or skin breakdown  - Initiate interventions,

## 2021-04-21 NOTE — PROGRESS NOTES
[facility] Pulmonary/Critical Care Progress Note     SUBJECTIVE/Interval history: All events, procedures, notes reviewed. Pt feels well on room air. Denies cp or sob. She feels like she did when she was 20.      Review o 3.7 3.4*   CL 96* 96* 94*   CO2 37.0* 34.0* 35.0*     Recent Labs   Lab 04/13/21 2013 04/13/21 2013 04/14/21  1145 04/15/21  0520 04/16/21  0433   RBC 5.45*   < > 4.78 5.01 5.14   HGB 13.6   < > 12.2 12.2 12.2   HCT 43.4   < > 40.7 39.8 40.7   MCV 79.6* RVR on warfarin  · HTN  · Former smoker, quit 1990s, 15-20 pack years  · Obesity, suspected ALEXYS  · ETOH dependence    PLAN    · Continue diuresis per Cardiology. She is on iv lasix infusion. she has lost 20 kg and net neg 23L  Plan for hemodynamic assessme

## 2021-04-21 NOTE — PROGRESS NOTES
[facility]  Progress Note     Rosaliaghulam Brennan Patient Status:  Inpatient    1945 MRN [id number]   [facility] 8NE-A Attending Ned Price MD   Hosp Day # 7 PCP None Pcp     Chief Complaint: Dyspnea     S: Patient no issues 4.2   CL 99  99   < > 96* 94* 96*   CO2 34.0*  34.0*   < > 34.0* 35.0* 37.0*   ALKPHO 76  --   --   --   --    AST 34  --   --   --   --    ALT 21  --   --   --   --    BILT 2.0  --   --   --   --    TP 7.1  --   --   --   --     < > = values in this inter testing is negative, may discontinue isolation: yes      Will the patient be referred to TCC on discharge?: no  Estimated date of discharge: tbd  Discharge is dependent on: clinical   At this point Ms. Keila Hicks is expected to be discharge to: home     Plan

## 2021-04-22 ENCOUNTER — APPOINTMENT (OUTPATIENT)
Dept: INTERVENTIONAL RADIOLOGY/VASCULAR | Facility: HOSPITAL | Age: 76
DRG: 286 | End: 2021-04-22
Attending: INTERNAL MEDICINE
Payer: MEDICARE

## 2021-04-22 PROCEDURE — 99232 SBSQ HOSP IP/OBS MODERATE 35: CPT | Performed by: INTERNAL MEDICINE

## 2021-04-22 PROCEDURE — B211YZZ FLUOROSCOPY OF MULTIPLE CORONARY ARTERIES USING OTHER CONTRAST: ICD-10-PCS | Performed by: INTERNAL MEDICINE

## 2021-04-22 PROCEDURE — 4A023N6 MEASUREMENT OF CARDIAC SAMPLING AND PRESSURE, RIGHT HEART, PERCUTANEOUS APPROACH: ICD-10-PCS | Performed by: INTERNAL MEDICINE

## 2021-04-22 PROCEDURE — 99152 MOD SED SAME PHYS/QHP 5/>YRS: CPT | Performed by: INTERNAL MEDICINE

## 2021-04-22 PROCEDURE — 93456 R HRT CORONARY ARTERY ANGIO: CPT | Performed by: INTERNAL MEDICINE

## 2021-04-22 RX ORDER — SILDENAFIL CITRATE 20 MG/1
20 TABLET ORAL 3 TIMES DAILY
Status: DISCONTINUED | OUTPATIENT
Start: 2021-04-22 | End: 2021-04-23

## 2021-04-22 RX ORDER — SODIUM CHLORIDE 9 MG/ML
INJECTION, SOLUTION INTRAVENOUS
Status: DISCONTINUED | OUTPATIENT
Start: 2021-04-22 | End: 2021-04-22 | Stop reason: HOSPADM

## 2021-04-22 RX ORDER — LIDOCAINE HYDROCHLORIDE 10 MG/ML
INJECTION, SOLUTION EPIDURAL; INFILTRATION; INTRACAUDAL; PERINEURAL
Status: COMPLETED
Start: 2021-04-22 | End: 2021-04-22

## 2021-04-22 RX ORDER — ACETAMINOPHEN AND CODEINE PHOSPHATE 300; 30 MG/1; MG/1
1 TABLET ORAL EVERY 4 HOURS PRN
Status: DISCONTINUED | OUTPATIENT
Start: 2021-04-22 | End: 2021-04-23

## 2021-04-22 RX ORDER — HEPARIN SODIUM 5000 [USP'U]/ML
INJECTION, SOLUTION INTRAVENOUS; SUBCUTANEOUS
Status: COMPLETED
Start: 2021-04-22 | End: 2021-04-22

## 2021-04-22 RX ORDER — WARFARIN SODIUM 2 MG/1
2 TABLET ORAL
Status: DISCONTINUED | OUTPATIENT
Start: 2021-04-22 | End: 2021-04-22

## 2021-04-22 RX ORDER — ACETAMINOPHEN AND CODEINE PHOSPHATE 300; 30 MG/1; MG/1
2 TABLET ORAL EVERY 4 HOURS PRN
Status: DISCONTINUED | OUTPATIENT
Start: 2021-04-22 | End: 2021-04-23

## 2021-04-22 RX ORDER — TORSEMIDE 20 MG/1
20 TABLET ORAL DAILY
Status: DISCONTINUED | OUTPATIENT
Start: 2021-04-23 | End: 2021-04-23

## 2021-04-22 RX ORDER — ACETAMINOPHEN 325 MG/1
650 TABLET ORAL EVERY 4 HOURS PRN
Status: DISCONTINUED | OUTPATIENT
Start: 2021-04-22 | End: 2021-04-23

## 2021-04-22 RX ORDER — SODIUM CHLORIDE 9 MG/ML
INJECTION, SOLUTION INTRAVENOUS CONTINUOUS
Status: ACTIVE | OUTPATIENT
Start: 2021-04-22 | End: 2021-04-22

## 2021-04-22 RX ORDER — LISINOPRIL 20 MG/1
20 TABLET ORAL DAILY
Status: DISCONTINUED | OUTPATIENT
Start: 2021-04-23 | End: 2021-04-23

## 2021-04-22 RX ORDER — MIDAZOLAM HYDROCHLORIDE 1 MG/ML
INJECTION INTRAMUSCULAR; INTRAVENOUS
Status: COMPLETED
Start: 2021-04-22 | End: 2021-04-22

## 2021-04-22 NOTE — PLAN OF CARE
Called to see the patient for small right groin hematoma and dull right sided back pain. Pressure held and femstop applied. Will keep on femstop for 2 hours, then 4 hours of bedrest. Hold Coumadin tonight. Tylenol # 3 for pain ordered. VSS.  Dr. Evelia Meeks not

## 2021-04-22 NOTE — PROGRESS NOTES
[facility]  Progress Note     Lencho Mukherjee Patient Status:  Inpatient    1945 MRN [id number]   [facility] 8NE-A Attending Yoko Coreas MD   Hosp Day # 8 PCP None Pcp     Chief Complaint: Dyspnea     S: Patient no issues 3.9 3.9   CL 99  99   < > 96* 96* 99   CO2 34.0*  34.0*   < > 37.0* 34.0* 33.0*   ALKPHO 76  --   --   --   --    AST 34  --   --   --   --    ALT 21  --   --   --   --    BILT 2.0  --   --   --   --    TP 7.1  --   --   --   --     < > = values in this in isolation: yes      Will the patient be referred to TCC on discharge?: no  Estimated date of discharge: tbd  Discharge is dependent on: clinical   At this point Ms. Ana Delgado is expected to be discharge to: home     Plan of care discussed with pt, RN    Ernst Handy

## 2021-04-22 NOTE — PROCEDURES
Full procedure was dictated:    Procedure performed:  -Selective coronary angiography   -Right heart catheterization  -Quick saturation run    No LV gram due to kidney insufficiency and echo was completed with normal LVEF and systolic LV dysfunction    Pro torsemide 20 mg p.o. daily rather than twice a day and we can always adjust later outpatient -we can always double the dose tomorrow prior to discharge based on clinical status and blood work  -DC IV Lasix drip  -hold coumadin tonight - start tomorrow - IN

## 2021-04-22 NOTE — PROGRESS NOTES
Pharmacy Dosing Service: Warfarin    Estela Ramos is a 66year old female for whom pharmacy is dosing warfarin (Coumadin) per consult from Dr. Bhupendra Mcgill.     Indication: Prophylaxis of venous thrombosis / h/o afib  Potential drug interactions: none  Other an

## 2021-04-22 NOTE — PLAN OF CARE
A/o x4, afib on tele. Within normal limits on room air. Denies any pain or shortness of breath at this time. Lasix drip infusing per MD orders. Plan of care discussed with pt, verbalized understanding. Call light within reach.        Problem: Patient/Family administer replacement therapy as ordered  Outcome: Progressing     Problem: GASTROINTESTINAL - ADULT  Goal: Minimal or absence of nausea and vomiting  Description: INTERVENTIONS:  - Maintain adequate hydration with IV or PO as ordered and tolerated  - Markell stability  Description: INTERVENTIONS  - Assess for signs and symptoms of bleeding or hemorrhage  - Monitor labs and vital signs for trends  - Administer supportive blood products/factors, fluids and medications as ordered and appropriate  - Administer sup

## 2021-04-22 NOTE — PLAN OF CARE
Assumed pt care at 0730. A&Ox4. RA, denies chest pain/SOB, lung sounds clear, VSS, afib on tele. Voids. Up ad maria. R groin site. possible hematoma to r side of dressing. femstop applied at 1500, removed at 1700.  Pea-sized hard spot noticed to L of dressing INTERVENTIONS:  - Continuous cardiac monitoring, monitor vital signs, obtain 12 lead EKG if indicated  - Evaluate effectiveness of antiarrhythmic and heart rate control medications as ordered  - Initiate emergency measures for life threatening arrhythmias pressure ulcer development  - Assess and document skin integrity  - Monitor for areas of redness and/or skin breakdown  - Initiate interventions, skin care algorithm/standards of care as needed  Outcome: Progressing     Problem: HEMATOLOGIC - ADULT  Goal:

## 2021-04-22 NOTE — PROGRESS NOTES
[facility]  Progress Note    Delmer Lerner Patient Status:  Inpatient    1945 MRN [id number]   [facility] 8NE-A Attending Marcia Mcguire MD   [facility] Day # 8 PCP None Pcp     Subjective:  Delmer Lerner is a(n) 66year old fem Recent Labs   Lab 04/20/21  0608 04/21/21  0551 04/22/21  0508   INR 2.23* 2.19* 2.23*       No results for input(s): ABGPHT, QPTNTG8D, XPFIH6W, ABGHCO3, ABGBE, TEMP, FRANKI, SITE, DEV, THGB in the last 168 hours.     Invalid input(s): ZGK02TYP, CHOB remains stable on RA  · Recommend sleep study  - ordered, and complete PFTs as outpatient  · Nebs prn  · Mobilize as able, pulm toilet  · Prophylaxis- protonix, coumadin  · Disp- floor. No acute pulm issues, will follow peripherally at this time.   Please

## 2021-04-23 ENCOUNTER — RESEARCH ENCOUNTER (OUTPATIENT)
Dept: CARDIOLOGY | Age: 76
End: 2021-04-23

## 2021-04-23 ENCOUNTER — ANTI-COAG (OUTPATIENT)
Dept: CARDIOLOGY | Age: 76
End: 2021-04-23

## 2021-04-23 ENCOUNTER — EXTERNAL RECORD (OUTPATIENT)
Dept: HEALTH INFORMATION MANAGEMENT | Facility: OTHER | Age: 76
End: 2021-04-23

## 2021-04-23 ENCOUNTER — TELEPHONE (OUTPATIENT)
Dept: CARDIOLOGY | Age: 76
End: 2021-04-23

## 2021-04-23 ENCOUNTER — ORDER TRANSCRIPTION (OUTPATIENT)
Dept: SLEEP CENTER | Age: 76
End: 2021-04-23

## 2021-04-23 VITALS
OXYGEN SATURATION: 98 % | HEIGHT: 68 IN | BODY MASS INDEX: 28.9 KG/M2 | RESPIRATION RATE: 18 BRPM | DIASTOLIC BLOOD PRESSURE: 58 MMHG | TEMPERATURE: 98 F | HEART RATE: 68 BPM | SYSTOLIC BLOOD PRESSURE: 106 MMHG | WEIGHT: 190.69 LBS

## 2021-04-23 DIAGNOSIS — I48.91 ATRIAL FIBRILLATION, UNSPECIFIED TYPE (CMD): Primary | ICD-10-CM

## 2021-04-23 DIAGNOSIS — I48.91 ATRIAL FIBRILLATION, UNSPECIFIED TYPE (CMD): ICD-10-CM

## 2021-04-23 DIAGNOSIS — Z11.59 SCREENING FOR VIRAL DISEASE: ICD-10-CM

## 2021-04-23 DIAGNOSIS — Z01.818 PREOP EXAMINATION: Primary | ICD-10-CM

## 2021-04-23 DIAGNOSIS — I50.9 CONGESTIVE HEART FAILURE, UNSPECIFIED HF CHRONICITY, UNSPECIFIED HEART FAILURE TYPE (CMD): Primary | ICD-10-CM

## 2021-04-23 PROCEDURE — 99233 SBSQ HOSP IP/OBS HIGH 50: CPT | Performed by: INTERNAL MEDICINE

## 2021-04-23 PROCEDURE — 99239 HOSP IP/OBS DSCHRG MGMT >30: CPT | Performed by: INTERNAL MEDICINE

## 2021-04-23 RX ORDER — WARFARIN SODIUM 2 MG/1
2 TABLET ORAL
Status: DISCONTINUED | OUTPATIENT
Start: 2021-04-23 | End: 2021-04-23

## 2021-04-23 RX ORDER — ERGOCALCIFEROL 1.25 MG/1
50000 CAPSULE ORAL
Qty: 4 CAPSULE | Refills: 1 | Status: SHIPPED | OUTPATIENT
Start: 2021-04-29 | End: 2021-08-02

## 2021-04-23 RX ORDER — WARFARIN SODIUM 2 MG/1
2 TABLET ORAL NIGHTLY
Qty: 30 TABLET | Refills: 3 | Status: SHIPPED | OUTPATIENT
Start: 2021-04-23 | End: 2021-04-23

## 2021-04-23 RX ORDER — POTASSIUM CHLORIDE 20 MEQ/1
40 TABLET, EXTENDED RELEASE ORAL ONCE
Status: COMPLETED | OUTPATIENT
Start: 2021-04-23 | End: 2021-04-23

## 2021-04-23 RX ORDER — EPLERENONE 50 MG/1
50 TABLET, FILM COATED ORAL DAILY
Qty: 30 TABLET | Refills: 3 | Status: SHIPPED | OUTPATIENT
Start: 2021-04-24

## 2021-04-23 RX ORDER — ERGOCALCIFEROL 1.25 MG/1
50000 CAPSULE ORAL
Qty: 4 CAPSULE | Refills: 1 | Status: SHIPPED | OUTPATIENT
Start: 2021-04-29 | End: 2021-04-23

## 2021-04-23 RX ORDER — SILDENAFIL CITRATE 20 MG/1
20 TABLET ORAL 3 TIMES DAILY
Qty: 90 TABLET | Refills: 3 | Status: SHIPPED | OUTPATIENT
Start: 2021-04-23

## 2021-04-23 RX ORDER — WARFARIN SODIUM 2 MG/1
2 TABLET ORAL NIGHTLY
Qty: 30 TABLET | Refills: 3 | Status: SHIPPED | OUTPATIENT
Start: 2021-04-23

## 2021-04-23 RX ORDER — SILDENAFIL CITRATE 20 MG/1
20 TABLET ORAL 3 TIMES DAILY
Qty: 90 TABLET | Refills: 3 | Status: SHIPPED | OUTPATIENT
Start: 2021-04-23 | End: 2021-04-23

## 2021-04-23 RX ORDER — TORSEMIDE 20 MG/1
20 TABLET ORAL DAILY
Qty: 30 TABLET | Refills: 3 | Status: SHIPPED | OUTPATIENT
Start: 2021-04-24

## 2021-04-23 RX ORDER — LISINOPRIL 20 MG/1
20 TABLET ORAL DAILY
Qty: 30 TABLET | Refills: 3 | Status: SHIPPED | OUTPATIENT
Start: 2021-04-24

## 2021-04-23 NOTE — PROGRESS NOTES
[facility] Dosing Service  Warfarin (Coumadin) Subsequent Dosing    Kristie Santiago is a 66year old patient for whom pharmacy is dosing warfarin (Coumadin).  Goal INR is 2-3    Recent Labs   Lab 04/19/21  0448 04/20/21  1162 04/21/21  0551 04/22/21  050

## 2021-04-23 NOTE — PLAN OF CARE
Problem: Patient/Family Goals  Goal: Patient/Family Long Term Goal  Description: Patient's Long Term Goal: \"go home by 4/25/21\"    Interventions:  - take medications as prescribed  - attend follow up appointments as recommended  - diet and exercise as adequate hydration with IV or PO as ordered and tolerated  - Nasogastric tube to low intermittent suction as ordered  - Evaluate effectiveness of ordered antiemetic medications  - Provide nonpharmacologic comfort measures as appropriate  - Advance diet as supportive blood products/factors, fluids and medications as ordered and appropriate  - Administer supportive blood products/factors as ordered and appropriate  Outcome: Adequate for Discharge

## 2021-04-23 NOTE — DIETARY NOTE
[facility]     Admitting diagnosis:  Supratherapeutic INR [R79.1]  Acute on chronic congestive heart failure, unspecified heart failure type ([facility] [address] [I50.9]    Ht: 172.7 cm (5' 8\")  Wt: 86.5 kg (190 lb 11.2 oz).

## 2021-04-23 NOTE — PROCEDURES
[facility]    PATIENT'S NAME: Danyshayne Noelnayan   ATTENDING PHYSICIAN: Maegan Perez MD   OPERATING PHYSICIAN: Sandra Rainey M.D.    PATIENT ACCOUNT#:   [de-identified]    LOCATION:  [address] [place] [address]  MEDICAL RECORD #:   [id number]       DATE OF BELÉN moderate conscious sedation with IV Versed and IV fentanyl. The time of first sedation dose was 1:14 p.m. and procedure ended at 2:05 p.m.   Blood pressure, pulse ox, and heart rate were monitored all the time by the nurse and myself, and IV line was check catheterization lab, and hemostasis was achieved with manual hold for 15 minutes. There was no bleeding or hematoma in the right groin. The patient tolerated the procedure very well and was transferred to telemetry for monitoring.   There were no immediat cardiac output with cardiac index of 2.3 L/min/m2. 7.   High pulmonary vascular resistance of 4.4 Wood units.   Of note, the right heart catheterization was very difficult requiring J-wire to advance to pulmonary artery due to enlarged right-sided heart ch Regi, and the nursing staff in detail. 19.   Will communicate with referring, Dr. Daxa Cohen. Dictated By Collette Cox M.D.  d: 04/22/2021 15:22:13  t: 04/22/2021 18:33:44  Job [id number]  BV/    cc: Kaela Barraza M.D.    Carla Siegel

## 2021-04-23 NOTE — PLAN OF CARE
A/o x4, afib on tele. Within normal limits on room air. Denies any pain or shortness of breath at this time. R groin cath site soft with no hematoma, dressing in place, clean, dry, intact. Pt lying flat until 2100 per MD orders.  Plan of care discussed with emergency measures for life threatening arrhythmias  - Monitor electrolytes and administer replacement therapy as ordered  Outcome: Progressing     Problem: GASTROINTESTINAL - ADULT  Goal: Minimal or absence of nausea and vomiting  Description: INTERVENTIO Progressing     Problem: HEMATOLOGIC - ADULT  Goal: Maintains hematologic stability  Description: INTERVENTIONS  - Assess for signs and symptoms of bleeding or hemorrhage  - Monitor labs and vital signs for trends  - Administer supportive blood products/fa

## 2021-04-23 NOTE — CM/SW NOTE
Pt also accepted by [facility] FORREST spoke to Scott at [facility] about the pt's PCP. Informed Scott that the pt has a scheduled f/u visit on Wed. 4/28 to establish a PCP.  Scott stated that they can see the pt prior to that visit, as long as pt has a sign

## 2021-04-23 NOTE — CM/SW NOTE
Informed by [facility] that sildenafil requires a PA.   Call placed to [phone] (pt ID #[id number])--QC obtained #PA [id number]--CWKV with PA per month $40.71--also checked with [facility] @ [facility]--cost with coupon $21.19--to present to pat

## 2021-04-23 NOTE — PLAN OF CARE
NURSING DISCHARGE NOTE    Discharged Home via Wheelchair. Accompanied by Support staff  Belongings Taken by patient/family. Reviewed discharge instructions with patient and new medications. Pt. Discharged home with daughter.

## 2021-04-23 NOTE — CM/SW NOTE
Briefly met with patient--since no pharmacy listed, script for sildenafil faxed to [facility]--await cost.  Additionally, will set up c--viky--will follow up in TCC

## 2021-04-23 NOTE — PROGRESS NOTES
[facility]  Progress Note     Zaynab Montoya Patient Status:  Inpatient    1945 MRN [id number]   [facility] 8NE-A Attending Medford Castleman MD   Hosp Day # 9 PCP None Pcp     Chief Complaint: Dyspnea     S: Patient   Alda Gomez Pro-Calcitonin  No results for input(s): PCT in the last 168 hours. Cardiac  Recent Labs   Lab 04/20/21  0608   PBNP 3,508*       Creatinine Kinase  No results for input(s): CK in the last 168 hours.     Inflammatory Markers  No results for input(s Agree with above. S/p RHC/LHC yesterday. No CP or SOB. Plan for DC later today.      BP 99/50   Pulse 64   Temp 97.6 °F (36.4 °C) (Oral)   Resp 18   Ht 172.7 cm (5' 8\")   Wt 190 lb 11.2 oz (86.5 kg)   LMP  (LMP Unknown)   SpO2 91%   BMI 29.00 kg/m²   CV: i

## 2021-04-23 NOTE — PLAN OF CARE
Pt. Is alert and oriented times four. Lungs clear on auscultation. Pt. Is in afib on monitor. Pt. Has no c/o pain. Right groin is dry and intact with sensation present. Pt. Is happy due to weight loss. Possible discharge later today.

## 2021-04-23 NOTE — PROGRESS NOTES
[facility] Cardiology  Progress Note  55-minute patient care including going through the chart, adjusting the medications, examining and seeing the patient, and discussing the plan as well as discussing enrollment of the patient in [facility] rashes. Psychiatric: no depression. Normal affect. Right groin without hematoma.       Recent Labs   Lab 04/19/21  0448 04/19/21  0448 04/19/21  1758 04/19/21  1758 04/20/21  5606 04/20/21  7566 04/20/21  1800 04/21/21  0551 04/21/21  0957 04/22/21  05 4/13/2021 at 8:34 PM     Finalized by (CST): Leonardo Cueva MD on 4/13/2021 at 8:34 PM       CATH ANGIO    Result Date: 4/22/2021  Sapna Steen MD     4/22/2021  3:46 PM Full procedure was dictated: Procedure performed: -Selective coronary angiogra -Resume oral diuretic tomorrow-since patient was not on any loop diuretic and we initiated higher dose of MRA -recommend torsemide 20 mg p.o. daily rather than twice a day and we can always adjust later outpatient -we can always double the dose tomorrow pr Coumadin -INR has not changed over last 3 days, despite of holding Coumadin for the procedure and will start lower dose of Coumadin  · -Acute on chronic renal insufficiency, stage III -stable despite aggressive diuresis creatinine 1.7.  · -Systemic hyperte weeks/ROSA-please arrange for outpatient visit    Discussed with the patient and nursing staff and Denae Llanes M.D., F.A.C.C.   Advanced Heart Failure  [facility]    4/23/2021  11 AM till 11:5

## 2021-04-24 NOTE — DISCHARGE SUMMARY
[facility] HOSPITALIST  DISCHARGE SUMMARY     Fernie Bautista Patient Status:  Inpatient    1945 MRN [id number]   [facility] 8NE-A Attending No att. providers found   Hosp Day # 9 PCP None Pcp     Date of Admission: 2021  Date of Disc Patient found to be with volume overload echocardiogram showed severe TR, moderate MR pulmonary hypertension. Patient was started on diuretics cardiology was consulted. Consult for advanced heart failure team was obtained.   Patient required Lasix drip ha cannot be completely   excluded on the basis of this study. The study is not technically  sufficient to allow evaluation of LV diastolic function. 2.  Ventricular septum: The contour showed diastolic flattening. 3.  Aortic valve: Mild regurgitation. 1 to 2 weeks  • New medications as listed below  • Heart failure instructions have been provided to patient and family  • Needs INR on Monday  • Daily weights    Lab/Test results pending at Discharge:   · None    Consultants:  • Cardiology pulmonology diet medications    hydrochlorothiazide 25 MG Tabs  Commonly known as: HYDRODIURIL        ramipril 2.5 MG Caps  Commonly known as: ALTACE              Where to Get Your Medications      Please  your prescriptions at the location directed by your doctor o located directly across from [facility]. &nbsp;  [place] Hours of Operation:&nbsp; MONDAY-FRIDAY&nbsp; 7:00AM - 2:40PM. Masks are required to be worn upon entering any [facility] facility.                5/3/2021  9:15

## 2021-04-26 ENCOUNTER — ANTI-COAG (OUTPATIENT)
Dept: CHRONIC DISEASE MANAGEMENT | Age: 76
End: 2021-04-26

## 2021-04-26 ENCOUNTER — PATIENT OUTREACH (OUTPATIENT)
Dept: CASE MANAGEMENT | Age: 76
End: 2021-04-26

## 2021-04-26 DIAGNOSIS — I50.9 ACUTE ON CHRONIC CONGESTIVE HEART FAILURE, UNSPECIFIED HEART FAILURE TYPE (HCC): ICD-10-CM

## 2021-04-26 DIAGNOSIS — I48.91 ATRIAL FIBRILLATION, UNSPECIFIED TYPE (CMD): ICD-10-CM

## 2021-04-26 DIAGNOSIS — Z02.9 ENCOUNTERS FOR UNSPECIFIED ADMINISTRATIVE PURPOSE: ICD-10-CM

## 2021-04-26 PROCEDURE — 1111F DSCHRG MED/CURRENT MED MERGE: CPT

## 2021-04-26 NOTE — PROGRESS NOTES
NCM attempted to contact the patient for HFU. No answer after about a minute of ringing and no option to leave a VM. NCM will try again later.

## 2021-04-26 NOTE — PAYOR COMM NOTE
--------------  DISCHARGE REVIEW    Payor: [facility] [employer] [place] #:  [id number]  Authorization Number: [id number]    Admit date: 4/14/21  Admit time:   1:08 AM  Discharge Date: 4/23/2021  4:30 PM     Admitting Physician: Yaa Bryan MD history. Per pt she has had some epistaxis lately, her last INR check was over 2 months ago. No recent fall or injury.    She also reports some \"gas like\" feeling in her epigastric region after eating at time but no abd pain, melena or hematochezia repor outpatient. Initial bedside PFTs showed  Obstructive/and restrictive deficit.-Obesity may be contributor.     Patient has been cleared for discharge today    Lace+ Score: 63  59-90 High Risk  29-58 Medium Risk  0-28   Low Risk  Patient was referred to the LV filling pressures with elevated RV filling pressures, moderate pulmonary hypertension cardiac index 2.3, high PVR  Incidental or significant findings and recommendations (brief descriptions):  • 50 pound weight loss with diuretics  • Home care set up wi Tabs  Start taking on: April 24, 2021  What changed: how much to take      Take 1 tablet (20 mg total) by mouth daily.    Quantity: 30 tablet  Refills: 3     Warfarin Sodium 2 MG Tabs  Commonly known as: Coumadin  What changed:   · medication strength  · ho 5/23/2021 Comment      Date Arrival Time Visit Type Length Department Provider     4/28/2021 12:00 PM  NON-TCM HOSPITAL FOLLOW UP [5060] 60 min [facility] ELIAS Daniel    Patient Instructions:                    4/30/2021 12:30 PM edema.  -----------------------------------------------------------------------------------------------  PATIENT DISCHARGE INSTRUCTIONS: See electronic chart    Joshua Giraldo NP    Time spent:  > 30 minutes    Electronically signed by Mookie Rodríguez

## 2021-04-27 ENCOUNTER — PATIENT OUTREACH (OUTPATIENT)
Dept: CASE MANAGEMENT | Age: 76
End: 2021-04-27

## 2021-04-27 ENCOUNTER — TELEPHONE (OUTPATIENT)
Dept: CARDIOLOGY | Age: 76
End: 2021-04-27

## 2021-04-27 NOTE — PROGRESS NOTES
Initial Post Discharge Follow Up   Discharge Date: 4/23/21  Contact Date: 4/27/2021    Consent Verification:  Assessment Completed With: Patient  HIPAA Verified? Yes    Discharge Dx:  1.  Acute heart failure exacerbation due to severe TR, moderate MR, s diet? no    Medications: Reviewed medication list.  Medications are up to date. Current Outpatient Medications   Medication Sig Dispense Refill   • lisinopril 20 MG Oral Tab Take 1 tablet (20 mg total) by mouth daily.  30 tablet 3   • torsemide 20 MG O weighing yourself? Daily   Is there any reason you are unable to weigh yourself daily? No  What was your weight yesterday? 192 lbs   Today? 192 lbs  Were you told about any fluid restrictions?  Yes  Have you noticed any shortness of breath or waking up stevan Alex is 21 days from your first appointment and Norah Carl is 28 days).   Fortino Goncalves Emergency Use Authorization  Norah Carl Emergency Use Authorization    Please cancel your appointment if - you begin to experience any symptoms of COVID-19 prior to your vaccinati those under 18 who are being vaccinated, parents/guardians must sign the consent forms AND be physically in attendance at the time of the vaccine. Note that your insurance company will be billed for a vaccine administration fee, if applicable.     Wear c version  [email or web address] pdf      Apr 28, 2021 12:00 PM CDT Hospital Follow Up with Radha Kay, [address] Leslye Brenner call. BROOK confirmed HFU appt in [facility] tomorrow and sent outreach encounter to EDW discharge team re: assistance in scheduling appts with Gilda Alex and [place]. Patient denies any questions or concerns at this time.       CCM referral placed:  Not Ap

## 2021-04-27 NOTE — PROGRESS NOTES
Patient has follow up appt needing to be scheduled with Dr. Livia Brown (Pulmonary) in 2 weeks and Dr. Jimmy Nevarez (Cardiovascular Diseases) in 1 week. No preference on date/time of appt. Please contact patient to schedule, thank you!

## 2021-04-27 NOTE — PROGRESS NOTES
1st attwmpt at scheduling follow up           Stefanie Colon MD  [address] [facility] [address]  [address], [facility]  137 [id number]    Called

## 2021-04-28 ENCOUNTER — ANTI-COAG (OUTPATIENT)
Dept: CARDIOLOGY | Age: 76
End: 2021-04-28

## 2021-04-28 ENCOUNTER — OFFICE VISIT (OUTPATIENT)
Dept: INTERNAL MEDICINE CLINIC | Facility: CLINIC | Age: 76
End: 2021-04-28
Payer: COMMERCIAL

## 2021-04-28 ENCOUNTER — IMMUNIZATION (OUTPATIENT)
Dept: LAB | Age: 76
End: 2021-04-28
Attending: HOSPITALIST
Payer: MEDICARE

## 2021-04-28 ENCOUNTER — TELEPHONE (OUTPATIENT)
Dept: CARDIOLOGY | Age: 76
End: 2021-04-28

## 2021-04-28 VITALS
HEART RATE: 80 BPM | HEIGHT: 68 IN | WEIGHT: 190 LBS | OXYGEN SATURATION: 99 % | RESPIRATION RATE: 18 BRPM | DIASTOLIC BLOOD PRESSURE: 62 MMHG | SYSTOLIC BLOOD PRESSURE: 108 MMHG | BODY MASS INDEX: 28.79 KG/M2 | TEMPERATURE: 97 F

## 2021-04-28 DIAGNOSIS — R79.1 SUPRATHERAPEUTIC INR: ICD-10-CM

## 2021-04-28 DIAGNOSIS — N18.31 STAGE 3A CHRONIC KIDNEY DISEASE (HCC): ICD-10-CM

## 2021-04-28 DIAGNOSIS — Z23 NEED FOR VACCINATION: Primary | ICD-10-CM

## 2021-04-28 DIAGNOSIS — I50.9 ACUTE ON CHRONIC CONGESTIVE HEART FAILURE, UNSPECIFIED HEART FAILURE TYPE (HCC): Primary | ICD-10-CM

## 2021-04-28 DIAGNOSIS — R06.81 APNEA: ICD-10-CM

## 2021-04-28 DIAGNOSIS — I48.91 ATRIAL FIBRILLATION, UNSPECIFIED TYPE (CMD): ICD-10-CM

## 2021-04-28 DIAGNOSIS — I48.20 CHRONIC ATRIAL FIBRILLATION (HCC): ICD-10-CM

## 2021-04-28 DIAGNOSIS — R06.83 SNORING: ICD-10-CM

## 2021-04-28 DIAGNOSIS — I27.20 PULMONARY HYPERTENSION (HCC): ICD-10-CM

## 2021-04-28 DIAGNOSIS — D50.8 IRON DEFICIENCY ANEMIA SECONDARY TO INADEQUATE DIETARY IRON INTAKE: ICD-10-CM

## 2021-04-28 PROBLEM — N18.30 CKD (CHRONIC KIDNEY DISEASE) STAGE 3, GFR 30-59 ML/MIN (HCC): Chronic | Status: ACTIVE | Noted: 2021-04-28

## 2021-04-28 LAB — INR PPP: 1.78

## 2021-04-28 PROCEDURE — 99495 TRANSJ CARE MGMT MOD F2F 14D: CPT | Performed by: NURSE PRACTITIONER

## 2021-04-28 PROCEDURE — 0001A SARSCOV2 VAC 30MCG/0.3ML IM: CPT

## 2021-04-28 PROCEDURE — 1111F DSCHRG MED/CURRENT MED MERGE: CPT | Performed by: NURSE PRACTITIONER

## 2021-04-28 PROCEDURE — 3074F SYST BP LT 130 MM HG: CPT | Performed by: NURSE PRACTITIONER

## 2021-04-28 PROCEDURE — 3008F BODY MASS INDEX DOCD: CPT | Performed by: NURSE PRACTITIONER

## 2021-04-28 PROCEDURE — 3078F DIAST BP <80 MM HG: CPT | Performed by: NURSE PRACTITIONER

## 2021-04-28 PROCEDURE — 85610 PROTHROMBIN TIME: CPT | Performed by: NURSE PRACTITIONER

## 2021-04-28 NOTE — PROGRESS NOTES
Patient had apt with the [facility] today and all apts were made.     [place] [address]  [phone]  Apt scheduled for May 5th @8:30am       Inga Garay MD  [address] [facility]

## 2021-04-28 NOTE — PROGRESS NOTES
TRANSITIONAL CARE CLINIC PHARMACIST MEDICATION RECONCILIATION        Sharron Smith MRN [id number]    1945 PCP None Pcp       Comments: Medication history completed by the [address] [place] [address] Pharmacist with the patient, daughter and VERITO in t blood pressure when she is having these symptoms to ensure the blood pressure is not dropping too low. Patient thinks these feelings may be due to the fact that she is deconditioned at this time.     Patient is unsure where her INR results are going at [facility]

## 2021-04-28 NOTE — PATIENT INSTRUCTIONS
PATIENT INSTRUCTIONS:    1.  [facility] ([facility]) will be managing your Warfarin. They will call you today with your INR results and further management.   They can be reached at: [phone].    2. Follow up with Dr. Steph Carbajal

## 2021-04-28 NOTE — PROGRESS NOTES
[facility] 6      HISTORY   CHIEF COMPLAINT: Acute on chronic CHF, pulmonary HTN, A-fib, CKDIII, supratherapeutic INR .      HPI: Mauro Bingham is a 66year old female here today for hospital follow up for Acute o groin in the hospital and has completely resolved at this time. She has started all new medications and is tolerating them.  Family had questions regarding diagnosis, follow-ups, and hospitalization and all were answered to patient and family's satisfaction tobacco: Never Used    Vaping Use      Vaping Use: Never used    Alcohol use: Yes      Comment: 2-3 drinks/week    Drug use: No       Imaging & Consults:        Lab Results   Component Value Date/Time    WBC 4.5 04/16/2021 04:33 AM    HGB 12.2 04/16/2021 0 audible murmur  GI: + BS to all quadrants, no tenderness  MUSCULOSKELETAL: + ROM in all joints, no evidence of swelling, pain   EXTREMITIES: no cyanosis, or edema  NEURO: Oriented x3  PSYCHIATRIC: appropriate affect    ASSESSMENT/ PLAN:   1.  Acute on chron [Q]          Standing Status: Future          Number of Occurrences: 1          Standing Expiration Date: 4/28/2022          Order Specific Question: Release to patient          Answer: Immediate      Medications & Refills for this Visit:  lisinopril 20 MG assumption/resumption of care  ? Education given to patient and family on self-management, independent living, and activities of daily living. ?  Referrals as listed below in orders Assisted in scheduling required follow-up with community providers and ser

## 2021-04-29 ENCOUNTER — OFFICE VISIT (OUTPATIENT)
Dept: CARDIOLOGY | Age: 76
End: 2021-04-29

## 2021-04-29 ENCOUNTER — TELEPHONE (OUTPATIENT)
Dept: CARDIOLOGY | Age: 76
End: 2021-04-29

## 2021-04-29 ENCOUNTER — LAB ENCOUNTER (OUTPATIENT)
Dept: LAB | Facility: HOSPITAL | Age: 76
End: 2021-04-29
Attending: INTERNAL MEDICINE
Payer: MEDICARE

## 2021-04-29 VITALS
HEIGHT: 68 IN | SYSTOLIC BLOOD PRESSURE: 88 MMHG | DIASTOLIC BLOOD PRESSURE: 54 MMHG | WEIGHT: 188 LBS | HEART RATE: 60 BPM | BODY MASS INDEX: 28.49 KG/M2

## 2021-04-29 DIAGNOSIS — I10 ESSENTIAL HYPERTENSION: ICD-10-CM

## 2021-04-29 DIAGNOSIS — I50.9 ACUTE ON CHRONIC CONGESTIVE HEART FAILURE, UNSPECIFIED HEART FAILURE TYPE (HCC): ICD-10-CM

## 2021-04-29 DIAGNOSIS — N18.31 STAGE 3A CHRONIC KIDNEY DISEASE (CMD): ICD-10-CM

## 2021-04-29 DIAGNOSIS — I50.33 ACUTE ON CHRONIC DIASTOLIC CONGESTIVE HEART FAILURE (CMD): ICD-10-CM

## 2021-04-29 DIAGNOSIS — Z09 HOSPITAL DISCHARGE FOLLOW-UP: Primary | ICD-10-CM

## 2021-04-29 DIAGNOSIS — I27.20 PULMONARY HYPERTENSION (CMD): ICD-10-CM

## 2021-04-29 DIAGNOSIS — I48.91 ATRIAL FIBRILLATION, UNSPECIFIED TYPE (CMD): ICD-10-CM

## 2021-04-29 DIAGNOSIS — I50.33 ACUTE ON CHRONIC DIASTOLIC CONGESTIVE HEART FAILURE (CMD): Primary | ICD-10-CM

## 2021-04-29 PROBLEM — D50.8 IRON DEFICIENCY ANEMIA SECONDARY TO INADEQUATE DIETARY IRON INTAKE: Status: ACTIVE | Noted: 2021-04-28

## 2021-04-29 PROCEDURE — 36415 COLL VENOUS BLD VENIPUNCTURE: CPT

## 2021-04-29 PROCEDURE — 80048 BASIC METABOLIC PNL TOTAL CA: CPT

## 2021-04-29 PROCEDURE — 3078F DIAST BP <80 MM HG: CPT | Performed by: NURSE PRACTITIONER

## 2021-04-29 PROCEDURE — 3074F SYST BP LT 130 MM HG: CPT | Performed by: NURSE PRACTITIONER

## 2021-04-29 PROCEDURE — 99214 OFFICE O/P EST MOD 30 MIN: CPT | Performed by: NURSE PRACTITIONER

## 2021-04-29 RX ORDER — HYDROCHLOROTHIAZIDE 25 MG/1
TABLET ORAL
COMMUNITY
Start: 2021-03-10 | End: 2021-04-29 | Stop reason: ALTCHOICE

## 2021-04-29 RX ORDER — CARVEDILOL 25 MG/1
25 TABLET ORAL 2 TIMES DAILY
COMMUNITY
Start: 2015-01-09 | End: 2022-09-08 | Stop reason: SDUPTHER

## 2021-04-29 RX ORDER — WARFARIN SODIUM 2 MG/1
2 TABLET ORAL
COMMUNITY
Start: 2021-04-23 | End: 2021-06-28 | Stop reason: DRUGHIGH

## 2021-04-29 RX ORDER — ERGOCALCIFEROL 1.25 MG/1
50000 CAPSULE ORAL
COMMUNITY
Start: 2021-04-29 | End: 2021-05-20

## 2021-04-29 RX ORDER — DIGOXIN 125 MCG
TABLET ORAL DAILY
COMMUNITY
Start: 2021-02-03 | End: 2022-07-21 | Stop reason: SDUPTHER

## 2021-04-29 RX ORDER — TORSEMIDE 20 MG/1
20 TABLET ORAL
COMMUNITY
Start: 2021-04-24 | End: 2021-05-03 | Stop reason: DRUGHIGH

## 2021-04-29 RX ORDER — WARFARIN SODIUM 4 MG/1
TABLET ORAL
COMMUNITY
Start: 2021-01-23 | End: 2021-04-29 | Stop reason: DRUGHIGH

## 2021-04-29 RX ORDER — DIGOXIN 250 MCG
0.12 TABLET ORAL
COMMUNITY
Start: 2015-01-19 | End: 2021-04-29 | Stop reason: DRUGHIGH

## 2021-04-29 RX ORDER — SILDENAFIL CITRATE 20 MG/1
20 TABLET ORAL 3 TIMES DAILY
COMMUNITY
Start: 2021-04-23

## 2021-04-29 RX ORDER — EPLERENONE 50 MG/1
50 TABLET, FILM COATED ORAL
COMMUNITY
Start: 2021-04-24

## 2021-04-29 RX ORDER — LISINOPRIL 20 MG/1
20 TABLET ORAL
COMMUNITY
Start: 2021-04-24

## 2021-04-29 ASSESSMENT — PATIENT HEALTH QUESTIONNAIRE - PHQ9
1. LITTLE INTEREST OR PLEASURE IN DOING THINGS: NOT AT ALL
SUM OF ALL RESPONSES TO PHQ9 QUESTIONS 1 AND 2: 0
SUM OF ALL RESPONSES TO PHQ9 QUESTIONS 1 AND 2: 0
CLINICAL INTERPRETATION OF PHQ2 SCORE: NO FURTHER SCREENING NEEDED
2. FEELING DOWN, DEPRESSED OR HOPELESS: NOT AT ALL
CLINICAL INTERPRETATION OF PHQ9 SCORE: NO FURTHER SCREENING NEEDED

## 2021-04-30 ENCOUNTER — LAB ENCOUNTER (OUTPATIENT)
Dept: LAB | Age: 76
End: 2021-04-30
Attending: INTERNAL MEDICINE
Payer: MEDICARE

## 2021-04-30 DIAGNOSIS — Z11.59 SCREENING FOR VIRAL DISEASE: ICD-10-CM

## 2021-04-30 DIAGNOSIS — Z01.818 PREOP EXAMINATION: ICD-10-CM

## 2021-05-03 ENCOUNTER — APPOINTMENT (OUTPATIENT)
Dept: CARDIOLOGY | Age: 76
End: 2021-05-03

## 2021-05-03 ENCOUNTER — TELEPHONE (OUTPATIENT)
Dept: CARDIOLOGY | Age: 76
End: 2021-05-03

## 2021-05-03 ENCOUNTER — ANTI-COAG (OUTPATIENT)
Dept: CARDIOLOGY | Age: 76
End: 2021-05-03

## 2021-05-03 ENCOUNTER — OFFICE VISIT (OUTPATIENT)
Dept: SLEEP CENTER | Age: 76
End: 2021-05-03
Attending: Other
Payer: MEDICARE

## 2021-05-03 DIAGNOSIS — N18.31 STAGE 3A CHRONIC KIDNEY DISEASE (CMD): ICD-10-CM

## 2021-05-03 DIAGNOSIS — I50.33 ACUTE ON CHRONIC DIASTOLIC CONGESTIVE HEART FAILURE (CMD): Primary | ICD-10-CM

## 2021-05-03 DIAGNOSIS — I50.9 ACUTE ON CHRONIC CONGESTIVE HEART FAILURE, UNSPECIFIED HEART FAILURE TYPE (HCC): ICD-10-CM

## 2021-05-03 DIAGNOSIS — I48.91 ATRIAL FIBRILLATION, UNSPECIFIED TYPE (CMD): ICD-10-CM

## 2021-05-03 DIAGNOSIS — G47.33 OSA (OBSTRUCTIVE SLEEP APNEA): ICD-10-CM

## 2021-05-03 DIAGNOSIS — E66.2 CLASS 2 OBESITY WITH ALVEOLAR HYPOVENTILATION, SERIOUS COMORBIDITY, AND BODY MASS INDEX (BMI) OF 35.0 TO 35.9 IN ADULT (HCC): ICD-10-CM

## 2021-05-03 PROCEDURE — 95811 POLYSOM 6/>YRS CPAP 4/> PARM: CPT

## 2021-05-03 RX ORDER — TORSEMIDE 20 MG/1
10 TABLET ORAL DAILY
COMMUNITY
End: 2021-05-20 | Stop reason: SDUPTHER

## 2021-05-04 ENCOUNTER — E-ADVICE (OUTPATIENT)
Dept: CARDIOLOGY | Age: 76
End: 2021-05-04

## 2021-05-05 ENCOUNTER — TELEPHONE (OUTPATIENT)
Dept: CARDIOLOGY | Age: 76
End: 2021-05-05

## 2021-05-05 ENCOUNTER — APPOINTMENT (OUTPATIENT)
Dept: CARDIOLOGY | Age: 76
End: 2021-05-05

## 2021-05-06 LAB
BUN SERPL-MCNC: 31 MG/DL (ref 7–25)
BUN/CREAT SERPL: 19 (CALC) (ref 6–22)
CALCIUM SERPL-MCNC: 10.4 MG/DL (ref 8.6–10.4)
CHLORIDE SERPL-SCNC: 99 MMOL/L (ref 98–110)
CO2 SERPL-SCNC: 30 MMOL/L (ref 20–32)
CREAT SERPL-MCNC: 1.6 MG/DL (ref 0.6–0.93)
GLUCOSE SERPL-MCNC: 80 MG/DL (ref 65–99)
INR PPP: 1.7
POTASSIUM SERPL-SCNC: 4.7 MMOL/L (ref 3.5–5.3)
PROTHROMBIN TIME: 16.5 SEC (ref 9–11.5)
SODIUM SERPL-SCNC: 137 MMOL/L (ref 135–146)

## 2021-05-07 ENCOUNTER — ANTI-COAG (OUTPATIENT)
Dept: CARDIOLOGY | Age: 76
End: 2021-05-07

## 2021-05-07 DIAGNOSIS — I48.91 ATRIAL FIBRILLATION, UNSPECIFIED TYPE (CMD): ICD-10-CM

## 2021-05-08 ENCOUNTER — TELEPHONE (OUTPATIENT)
Dept: CARDIOLOGY | Age: 76
End: 2021-05-08

## 2021-05-13 LAB — INR PPP: 2

## 2021-05-14 ENCOUNTER — ANTI-COAG (OUTPATIENT)
Dept: CARDIOLOGY | Age: 76
End: 2021-05-14

## 2021-05-14 DIAGNOSIS — I48.91 ATRIAL FIBRILLATION, UNSPECIFIED TYPE (CMD): ICD-10-CM

## 2021-05-14 LAB
INR PPP: 2
PROTHROMBIN TIME: 19.7 SEC (ref 9–11.5)

## 2021-05-19 ENCOUNTER — IMMUNIZATION (OUTPATIENT)
Dept: LAB | Age: 76
End: 2021-05-19
Attending: HOSPITALIST
Payer: MEDICARE

## 2021-05-19 DIAGNOSIS — Z23 NEED FOR VACCINATION: Primary | ICD-10-CM

## 2021-05-19 PROCEDURE — 0002A SARSCOV2 VAC 30MCG/0.3ML IM: CPT

## 2021-05-20 ENCOUNTER — OFFICE VISIT (OUTPATIENT)
Dept: CARDIOLOGY | Age: 76
End: 2021-05-20

## 2021-05-20 ENCOUNTER — ANTI-COAG (OUTPATIENT)
Dept: CARDIOLOGY | Age: 76
End: 2021-05-20

## 2021-05-20 VITALS
WEIGHT: 185 LBS | BODY MASS INDEX: 28.04 KG/M2 | SYSTOLIC BLOOD PRESSURE: 108 MMHG | DIASTOLIC BLOOD PRESSURE: 60 MMHG | HEART RATE: 64 BPM | HEIGHT: 68 IN

## 2021-05-20 DIAGNOSIS — I48.91 ATRIAL FIBRILLATION, UNSPECIFIED TYPE (CMD): Primary | ICD-10-CM

## 2021-05-20 DIAGNOSIS — I27.20 PULMONARY HYPERTENSION (CMD): ICD-10-CM

## 2021-05-20 DIAGNOSIS — I50.33 ACUTE ON CHRONIC DIASTOLIC CONGESTIVE HEART FAILURE (CMD): ICD-10-CM

## 2021-05-20 DIAGNOSIS — N18.31 STAGE 3A CHRONIC KIDNEY DISEASE (CMD): ICD-10-CM

## 2021-05-20 DIAGNOSIS — I10 ESSENTIAL HYPERTENSION: ICD-10-CM

## 2021-05-20 DIAGNOSIS — I48.91 ATRIAL FIBRILLATION, UNSPECIFIED TYPE (CMD): ICD-10-CM

## 2021-05-20 LAB
INR PPP: 2.4
PROTHROMBIN TIME: 23.5 SEC (ref 9–11.5)

## 2021-05-20 PROCEDURE — 3074F SYST BP LT 130 MM HG: CPT | Performed by: NURSE PRACTITIONER

## 2021-05-20 PROCEDURE — 99214 OFFICE O/P EST MOD 30 MIN: CPT | Performed by: NURSE PRACTITIONER

## 2021-05-20 PROCEDURE — 3078F DIAST BP <80 MM HG: CPT | Performed by: NURSE PRACTITIONER

## 2021-05-20 RX ORDER — TORSEMIDE 10 MG/1
10 TABLET ORAL DAILY
Qty: 90 TABLET | Refills: 3 | Status: SHIPPED | OUTPATIENT
Start: 2021-05-20

## 2021-05-20 RX ORDER — ERGOCALCIFEROL 1.25 MG/1
CAPSULE ORAL
COMMUNITY

## 2021-05-20 ASSESSMENT — PATIENT HEALTH QUESTIONNAIRE - PHQ9
CLINICAL INTERPRETATION OF PHQ2 SCORE: NO FURTHER SCREENING NEEDED
CLINICAL INTERPRETATION OF PHQ9 SCORE: NO FURTHER SCREENING NEEDED
SUM OF ALL RESPONSES TO PHQ9 QUESTIONS 1 AND 2: 0
1. LITTLE INTEREST OR PLEASURE IN DOING THINGS: NOT AT ALL
2. FEELING DOWN, DEPRESSED OR HOPELESS: NOT AT ALL
SUM OF ALL RESPONSES TO PHQ9 QUESTIONS 1 AND 2: 0

## 2021-06-04 ENCOUNTER — ANTI-COAG (OUTPATIENT)
Dept: CARDIOLOGY | Age: 76
End: 2021-06-04

## 2021-06-04 DIAGNOSIS — I48.91 ATRIAL FIBRILLATION, UNSPECIFIED TYPE (CMD): ICD-10-CM

## 2021-06-07 LAB — INR PPP: 1.5

## 2021-06-08 ENCOUNTER — ANTI-COAG (OUTPATIENT)
Dept: CARDIOLOGY | Age: 76
End: 2021-06-08

## 2021-06-08 DIAGNOSIS — I48.91 ATRIAL FIBRILLATION, UNSPECIFIED TYPE (CMD): ICD-10-CM

## 2021-06-08 LAB
INR PPP: 1.5
PROTHROMBIN TIME: 15.3 SEC (ref 9–11.5)

## 2021-06-11 ENCOUNTER — LAB ENCOUNTER (OUTPATIENT)
Dept: LAB | Facility: HOSPITAL | Age: 76
End: 2021-06-11
Attending: INTERNAL MEDICINE
Payer: MEDICARE

## 2021-06-11 DIAGNOSIS — R06.00 DYSPNEA ON EXERTION: ICD-10-CM

## 2021-06-14 ENCOUNTER — TELEPHONE (OUTPATIENT)
Dept: CARDIOLOGY | Age: 76
End: 2021-06-14

## 2021-06-14 ENCOUNTER — RT VISIT (OUTPATIENT)
Dept: RESPIRATORY THERAPY | Facility: HOSPITAL | Age: 76
End: 2021-06-14
Attending: INTERNAL MEDICINE
Payer: MEDICARE

## 2021-06-14 DIAGNOSIS — R06.00 DYSPNEA ON EXERTION: ICD-10-CM

## 2021-06-14 PROCEDURE — 4A09X1Z MEASUREMENT OF RESPIRATORY CAPACITY, EXTERNAL APPROACH: ICD-10-PCS | Performed by: INTERNAL MEDICINE

## 2021-06-14 PROCEDURE — 4A09X5Z MEASUREMENT OF RESPIRATORY FLOW, EXTERNAL APPROACH: ICD-10-PCS | Performed by: INTERNAL MEDICINE

## 2021-06-14 PROCEDURE — 94060 EVALUATION OF WHEEZING: CPT

## 2021-06-14 PROCEDURE — 94726 PLETHYSMOGRAPHY LUNG VOLUMES: CPT

## 2021-06-14 PROCEDURE — 94729 DIFFUSING CAPACITY: CPT

## 2021-06-14 PROCEDURE — 4A09XLZ MEASUREMENT OF RESPIRATORY VOLUME, EXTERNAL APPROACH: ICD-10-PCS | Performed by: INTERNAL MEDICINE

## 2021-06-15 ENCOUNTER — ANTI-COAG (OUTPATIENT)
Dept: CARDIOLOGY | Age: 76
End: 2021-06-15

## 2021-06-15 DIAGNOSIS — I48.91 ATRIAL FIBRILLATION, UNSPECIFIED TYPE (CMD): ICD-10-CM

## 2021-06-15 LAB
INR PPP: 1.9
PROTHROMBIN TIME: 18.7 SEC (ref 9–11.5)

## 2021-06-15 NOTE — PROCEDURES
Findings:  Postbronchodilator FEV1 is 1.08L, 55% predicted. Postbronchodilator FVC is 1.52L, 60% predicted. FEV1/ FVC ratio is 0.71. There is a significant bronchodilator response after   administration of albuterol.    The flow-volume loop demonstrates

## 2021-06-25 ENCOUNTER — E-ADVICE (OUTPATIENT)
Dept: CARDIOLOGY | Age: 76
End: 2021-06-25

## 2021-06-28 DIAGNOSIS — I48.91 ATRIAL FIBRILLATION, UNSPECIFIED TYPE (CMD): Primary | ICD-10-CM

## 2021-06-28 RX ORDER — WARFARIN SODIUM 4 MG/1
4 TABLET ORAL DAILY
Qty: 115 TABLET | Refills: 2 | Status: SHIPPED | OUTPATIENT
Start: 2021-06-28 | End: 2021-07-28

## 2021-06-29 ENCOUNTER — ANTI-COAG (OUTPATIENT)
Dept: CARDIOLOGY | Age: 76
End: 2021-06-29

## 2021-06-29 DIAGNOSIS — I48.91 ATRIAL FIBRILLATION, UNSPECIFIED TYPE (CMD): Primary | ICD-10-CM

## 2021-06-29 LAB
INR PPP: 2.1
PROTHROMBIN TIME: 20.3 SEC (ref 9–11.5)

## 2021-07-02 ENCOUNTER — HOSPITAL ENCOUNTER (OUTPATIENT)
Dept: CV DIAGNOSTICS | Facility: HOSPITAL | Age: 76
Discharge: HOME | End: 2021-07-02
Attending: INTERNAL MEDICINE
Payer: MEDICARE

## 2021-07-02 DIAGNOSIS — I50.9 ACUTE ON CHRONIC CONGESTIVE HEART FAILURE, UNSPECIFIED HEART FAILURE TYPE (HCC): ICD-10-CM

## 2021-07-02 DIAGNOSIS — R06.00 DYSPNEA ON EXERTION: ICD-10-CM

## 2021-07-02 PROCEDURE — 93306 TTE W/DOPPLER COMPLETE: CPT | Performed by: INTERNAL MEDICINE

## 2021-07-11 LAB
INR PPP: 2.1
PROTHROMBIN TIME: 20.3 SEC (ref 9–11.5)

## 2021-07-19 ENCOUNTER — ANTI-COAG (OUTPATIENT)
Dept: CARDIOLOGY | Age: 76
End: 2021-07-19

## 2021-07-19 DIAGNOSIS — I48.91 ATRIAL FIBRILLATION, UNSPECIFIED TYPE (CMD): Primary | ICD-10-CM

## 2021-08-02 RX ORDER — WARFARIN SODIUM 4 MG/1
4 TABLET ORAL NIGHTLY
COMMUNITY

## 2021-08-03 ENCOUNTER — HOSPITAL ENCOUNTER (OUTPATIENT)
Dept: CV DIAGNOSTICS | Facility: HOSPITAL | Age: 76
Discharge: HOME | End: 2021-08-03
Attending: INTERNAL MEDICINE | Admitting: INTERNAL MEDICINE
Payer: MEDICARE

## 2021-08-03 ENCOUNTER — HOSPITAL ENCOUNTER (OUTPATIENT)
Dept: INTERVENTIONAL RADIOLOGY/VASCULAR | Facility: HOSPITAL | Age: 76
Discharge: HOME | End: 2021-08-03
Attending: INTERNAL MEDICINE
Payer: MEDICARE

## 2021-08-03 VITALS
SYSTOLIC BLOOD PRESSURE: 98 MMHG | BODY MASS INDEX: 27.43 KG/M2 | HEIGHT: 68 IN | RESPIRATION RATE: 15 BRPM | DIASTOLIC BLOOD PRESSURE: 57 MMHG | WEIGHT: 181 LBS | HEART RATE: 73 BPM | TEMPERATURE: 98 F | OXYGEN SATURATION: 96 %

## 2021-08-03 DIAGNOSIS — I34.0 MITRAL VALVE INSUFFICIENCY, UNSPECIFIED ETIOLOGY: ICD-10-CM

## 2021-08-03 LAB — INR: 1.9 (ref 0.8–1.3)

## 2021-08-03 PROCEDURE — 99153 MOD SED SAME PHYS/QHP EA: CPT

## 2021-08-03 PROCEDURE — 93320 DOPPLER ECHO COMPLETE: CPT | Performed by: INTERNAL MEDICINE

## 2021-08-03 PROCEDURE — 93312 ECHO TRANSESOPHAGEAL: CPT

## 2021-08-03 PROCEDURE — 99152 MOD SED SAME PHYS/QHP 5/>YRS: CPT

## 2021-08-03 PROCEDURE — 93325 DOPPLER ECHO COLOR FLOW MAPG: CPT | Performed by: INTERNAL MEDICINE

## 2021-08-03 PROCEDURE — 85610 PROTHROMBIN TIME: CPT

## 2021-08-03 RX ORDER — SODIUM CHLORIDE 9 MG/ML
INJECTION, SOLUTION INTRAVENOUS
Status: DISCONTINUED | OUTPATIENT
Start: 2021-08-04 | End: 2021-08-04

## 2021-08-03 RX ORDER — MIDAZOLAM HYDROCHLORIDE 1 MG/ML
4 INJECTION INTRAMUSCULAR; INTRAVENOUS ONCE
Status: COMPLETED | OUTPATIENT
Start: 2021-08-03 | End: 2021-08-03

## 2021-08-03 RX ORDER — MIDAZOLAM HYDROCHLORIDE 1 MG/ML
INJECTION INTRAMUSCULAR; INTRAVENOUS
Status: COMPLETED
Start: 2021-08-03 | End: 2021-08-03

## 2021-08-03 RX ADMIN — MIDAZOLAM HYDROCHLORIDE 4 MG: 1 INJECTION INTRAMUSCULAR; INTRAVENOUS at 12:29:00

## 2021-08-03 NOTE — H&P
History & Physical Examination    Patient Name: Milton Weller  MRN: [id number]  [facility]: [id number]  YOB: 1945    Diagnosis: mod-severe MR    History of Present Illness: 65 yo female with chronic afib, HTN, valvular heart failure with recent RHC

## 2021-08-03 NOTE — PROGRESS NOTES
SRAVANTHI at bedside with Dr Tamera Duane. Pt tolerated procedure well. See bedside sedation record. Pt suctioned clear white secretions. Dr Tamera Duane spoke with pts daughter. Pt tolerating liquids well. IV removed with catheter intact.  Reviewed discharge instruc

## 2021-08-03 NOTE — PROCEDURES
Cardiology Transesophageal Echo Note    PRE-PROCEDURE DIAGNOSIS: mitral regurgitation/tricuspid regurgitation    PROCEDURE: Transesophageal Echocardiogram.    SEDATION:   Topical spray x 1  Versed: 3 mg  Fentanyl: 75 mcg    I personally supervised the intr thrombus.       [facility] MD Marika  8/3/2021  12:30 PM

## 2021-08-10 ENCOUNTER — TELEPHONE (OUTPATIENT)
Dept: CARDIOLOGY | Age: 76
End: 2021-08-10

## 2021-08-20 ENCOUNTER — TELEPHONE (OUTPATIENT)
Dept: CARDIOLOGY | Age: 76
End: 2021-08-20

## 2021-08-25 LAB
INR PPP: 1.9
PROTHROMBIN TIME: 18.8 SEC (ref 9–11.5)

## 2021-09-17 ENCOUNTER — TELEPHONE (OUTPATIENT)
Dept: CARDIOLOGY CLINIC | Facility: HOSPITAL | Age: 76
End: 2021-09-17

## 2021-09-17 NOTE — TELEPHONE ENCOUNTER
Mitraclip referral from Dr. Ann Do; process/procedure explained to patient and daughter. Education folder given. Consult set up for 9/30 at 9:45. Questions answered.

## 2021-09-29 LAB
INR PPP: 1.6
PROTHROMBIN TIME: 15.4 SEC (ref 9–11.5)

## 2021-09-30 ENCOUNTER — HOSPITAL ENCOUNTER (OUTPATIENT)
Dept: CARDIOLOGY CLINIC | Facility: HOSPITAL | Age: 76
Discharge: HOME | End: 2021-09-30
Attending: INTERNAL MEDICINE
Payer: MEDICARE

## 2021-09-30 VITALS — DIASTOLIC BLOOD PRESSURE: 94 MMHG | OXYGEN SATURATION: 98 % | HEART RATE: 74 BPM | SYSTOLIC BLOOD PRESSURE: 129 MMHG

## 2021-09-30 PROCEDURE — 99212 OFFICE O/P EST SF 10 MIN: CPT

## 2021-09-30 NOTE — CONSULTS
[facility]    PATIENT'S NAME: Dmitriy Ramesh   ATTENDING PHYSICIAN: Alexa Browne M.D.   CONSULTING PHYSICIAN: Alexa Browne M.D.    PATIENT ACCOUNT#:   [de-identified]    LOCATION:    MEDICAL RECORD #:   [id number]       YOB: 1945 murmur. No abdominal distension and no significant edema. ASSESSMENT AND PLAN:  Her calculated SDS risk score for valve replacement was 6.6%. (I am not sure if this includes tricuspid repair, but that would certainly be required during any operation. ) members. In the interim, she should continue her medical regimen, as she is doing very well on that clinically.     Dictated By Alexa Browne M.D.  d: 09/30/2021 11:28:29  t: 09/30/2021 12:55:15  Esa Stanford [id number]  FZ/

## 2021-09-30 NOTE — CONSULTS
[facility]  Report of Consultation    Roseteo Roereg Patient Status:  Outpatient    1945 MRN [id number]   Location [address] [place] [address] Attending Denis Palma MD   Hosp Day # 0 PCP None Pcp     Date of Admission:  9 hypertension      No past surgical history on file.   Family History   Problem Relation Age of Onset   • Heart Disorder Father    • Cancer Sister         leukemia   • Cancer Brother         prostate cancer      reports that she quit smoking about 27 years a Normal cardiac output with cardiac index of 2.3 L/min/m2. 7.     High pulmonary vascular resistance of 4.4 Wood units.   Of note, the right heart catheterization was very difficult requiring J-wire to advance to pulmonary artery due to enlarged right-sided estimated at 55%) without wall motion abnormalities.  LV chamber size appears dilated .   · Mitral valve annulus shows mild calcifications, with mildly calcified leaflets.  There is malcoaptation of the valve leaflets with resultant moderate-to-severe ria moderate to severe MR with a central jet. I do not appreciate primary valvular pathology in either the mitral or tricuspid valves. Her STS risk score for MVR is 6.567%, MV repair 3.204%.   I believe she is a high risk candidate for open heart surgery

## 2022-05-23 ENCOUNTER — PRIOR ORIGINAL RECORDS (OUTPATIENT)
Dept: HEALTH INFORMATION MANAGEMENT | Facility: OTHER | Age: 77
End: 2022-05-23

## 2022-06-21 PROBLEM — I38 VALVULAR HEART DISEASE: Status: ACTIVE | Noted: 2022-06-21

## 2022-06-21 PROBLEM — G47.30 SLEEP APNEA: Status: ACTIVE | Noted: 2022-06-21

## 2022-06-21 PROBLEM — N17.9 AKI (ACUTE KIDNEY INJURY) (CMD): Status: ACTIVE | Noted: 2022-06-21

## 2022-06-21 PROBLEM — R06.83 SNORING: Status: ACTIVE | Noted: 2022-06-21

## 2022-07-20 ENCOUNTER — APPOINTMENT (OUTPATIENT)
Dept: CARDIOLOGY | Age: 77
End: 2022-07-20

## 2022-07-21 RX ORDER — DIGOXIN 125 MCG
125 TABLET ORAL DAILY
Qty: 90 TABLET | Refills: 0 | Status: SHIPPED | OUTPATIENT
Start: 2022-07-21

## 2022-09-08 RX ORDER — CARVEDILOL 25 MG/1
25 TABLET ORAL 2 TIMES DAILY
Qty: 180 TABLET | Refills: 1 | Status: SHIPPED | OUTPATIENT
Start: 2022-09-08 | End: 2023-03-02 | Stop reason: SDUPTHER

## 2022-09-29 ENCOUNTER — APPOINTMENT (OUTPATIENT)
Dept: CARDIOLOGY | Age: 77
End: 2022-09-29

## 2022-10-12 ENCOUNTER — EXTERNAL RECORD (OUTPATIENT)
Dept: HEALTH INFORMATION MANAGEMENT | Facility: OTHER | Age: 77
End: 2022-10-12

## 2023-03-02 ENCOUNTER — TELEPHONE (OUTPATIENT)
Dept: CARDIOLOGY | Age: 78
End: 2023-03-02

## 2023-03-02 RX ORDER — CARVEDILOL 25 MG/1
25 TABLET ORAL 2 TIMES DAILY
Qty: 60 TABLET | Refills: 0 | Status: SHIPPED | OUTPATIENT
Start: 2023-03-02

## 2023-06-26 NOTE — H&P
====================================================================  History reviewed and up to date. No changes to H&P. The patient is scheduled for SRAVANTHI. Patient was consented. The risks and benefits of the procedure were explained to the patient.      Cassandra Morales MD  7/6/2023  9:29 AM

## 2023-07-06 ENCOUNTER — HOSPITAL ENCOUNTER (OUTPATIENT)
Dept: INTERVENTIONAL RADIOLOGY/VASCULAR | Facility: HOSPITAL | Age: 78
Discharge: HOME | End: 2023-07-06
Attending: INTERNAL MEDICINE | Admitting: INTERNAL MEDICINE
Payer: MEDICARE

## 2023-07-06 VITALS
WEIGHT: 185 LBS | HEIGHT: 68 IN | HEART RATE: 75 BPM | TEMPERATURE: 97 F | DIASTOLIC BLOOD PRESSURE: 59 MMHG | OXYGEN SATURATION: 98 % | SYSTOLIC BLOOD PRESSURE: 100 MMHG | RESPIRATION RATE: 20 BRPM | BODY MASS INDEX: 28.04 KG/M2

## 2023-07-06 DIAGNOSIS — I35.1 AORTIC VALVE INSUFFICIENCY, ETIOLOGY OF CARDIAC VALVE DISEASE UNSPECIFIED: ICD-10-CM

## 2023-07-06 DIAGNOSIS — I34.0 MITRAL REGURGITATION: ICD-10-CM

## 2023-07-06 PROCEDURE — 99153 MOD SED SAME PHYS/QHP EA: CPT | Performed by: INTERNAL MEDICINE

## 2023-07-06 PROCEDURE — 99152 MOD SED SAME PHYS/QHP 5/>YRS: CPT | Performed by: INTERNAL MEDICINE

## 2023-07-06 PROCEDURE — 93312 ECHO TRANSESOPHAGEAL: CPT | Performed by: INTERNAL MEDICINE

## 2023-07-06 RX ORDER — MIDAZOLAM HYDROCHLORIDE 1 MG/ML
INJECTION INTRAMUSCULAR; INTRAVENOUS
Status: COMPLETED
Start: 2023-07-06 | End: 2023-07-06

## 2023-07-06 RX ORDER — SODIUM CHLORIDE 9 MG/ML
INJECTION, SOLUTION INTRAVENOUS
Status: DISCONTINUED | OUTPATIENT
Start: 2023-07-07 | End: 2023-07-06

## 2023-07-06 RX ORDER — MIDAZOLAM HYDROCHLORIDE 1 MG/ML
INJECTION INTRAMUSCULAR; INTRAVENOUS
Status: DISCONTINUED
Start: 2023-07-06 | End: 2023-07-06 | Stop reason: WASHOUT

## 2023-07-06 NOTE — PROCEDURES
Cardiology Attempted Transesophageal Echo Note     PRE-PROCEDURE DIAGNOSIS:  Mitral regurgitation     PROCEDURE: Transesophageal Echocardiogram.     SEDATION:   Topical spray x 1  Versed: 6 mg  Fentanyl: 125 mcg  I personally supervised the intravenous administration of conscious sedation. This was performed with continuous respiratory, hemodynamic, and electrocardiographic monitoring. Sedation was supervised from 8:41 AM - 9:14 AM, a total of 33 minutes. DESCRIPTION:   Informed consent was obtained after risks and benefits of the procedure were explained. Oral hurricaine spray was placed in the oropharynx. Conscious sedation was given. After adequate anesthesia, the SRAVANTHI probe was attempted to be placed in the oropharynx with the esophagus unable to be successfully intubated. Patient was sedated but patient kept waking up with associated resistance due to tightness in muscle in the oropharynx.        COMPLICATIONS: none     RECOMMENDATIONS:  Reattempt SRAVANTHI with anesthesiologist.    Norah Pradhan MD  7/6/2023  9:29 AM

## 2023-07-06 NOTE — PROGRESS NOTES
Pt s/p unsuccessful SRAVANTHI with Dr. Bri Barbosa. The SRAVANTHI probe was unable to pass. Pt needs to come back and have the procedure done with anesthesia. During this procedure pt received a total of versed 6mg and fentanyl 125mcg IVP in increments-see bedside flowsheet for exact times. Pt suctioned as needed. After attempted procedure, pt recovered in holding. Pt daughter at bedside. Pt ate and drank with no problems. After recovery, IV removed and pt dressed. Pt discharged to [facility] via wheelchair by volunteer. Pt left with belongings. Pt daughter drove pt home.

## 2023-07-12 NOTE — H&P
====================================================================  History reviewed and up to date. No changes to H&P. The patient is scheduled for SRAVANTHI. Patient was consented. The risks and benefits of the procedure were explained to the patient.      Minerva Perez MD  7/18/2023  2:10 PM

## 2023-07-18 ENCOUNTER — HOSPITAL ENCOUNTER (OUTPATIENT)
Dept: INTERVENTIONAL RADIOLOGY/VASCULAR | Facility: HOSPITAL | Age: 78
Discharge: HOME | End: 2023-07-18
Attending: INTERNAL MEDICINE | Admitting: INTERNAL MEDICINE
Payer: MEDICARE

## 2023-07-18 ENCOUNTER — ANESTHESIA (OUTPATIENT)
Dept: INTERVENTIONAL RADIOLOGY/VASCULAR | Facility: HOSPITAL | Age: 78
End: 2023-07-18
Payer: MEDICARE

## 2023-07-18 ENCOUNTER — HOSPITAL ENCOUNTER (OUTPATIENT)
Dept: CV DIAGNOSTICS | Facility: HOSPITAL | Age: 78
Discharge: HOME | End: 2023-07-18
Attending: INTERNAL MEDICINE
Payer: MEDICARE

## 2023-07-18 VITALS
OXYGEN SATURATION: 100 % | SYSTOLIC BLOOD PRESSURE: 108 MMHG | HEART RATE: 48 BPM | RESPIRATION RATE: 15 BRPM | DIASTOLIC BLOOD PRESSURE: 60 MMHG

## 2023-07-18 DIAGNOSIS — I08.0 MITRAL AND AORTIC REGURGITATION: ICD-10-CM

## 2023-07-18 PROCEDURE — 93312 ECHO TRANSESOPHAGEAL: CPT

## 2023-07-18 PROCEDURE — B24BZZ4 ULTRASONOGRAPHY OF HEART WITH AORTA, TRANSESOPHAGEAL: ICD-10-PCS | Performed by: INTERNAL MEDICINE

## 2023-07-18 RX ORDER — METOPROLOL TARTRATE 5 MG/5ML
2.5 INJECTION INTRAVENOUS ONCE
OUTPATIENT
Start: 2023-07-18 | End: 2023-07-18

## 2023-07-18 RX ORDER — HYDROCODONE BITARTRATE AND ACETAMINOPHEN 5; 325 MG/1; MG/1
1 TABLET ORAL ONCE AS NEEDED
OUTPATIENT
Start: 2023-07-18 | End: 2023-07-18

## 2023-07-18 RX ORDER — SODIUM CHLORIDE, SODIUM LACTATE, POTASSIUM CHLORIDE, CALCIUM CHLORIDE 600; 310; 30; 20 MG/100ML; MG/100ML; MG/100ML; MG/100ML
INJECTION, SOLUTION INTRAVENOUS CONTINUOUS
OUTPATIENT
Start: 2023-07-18

## 2023-07-18 RX ORDER — HYDROMORPHONE HYDROCHLORIDE 1 MG/ML
0.4 INJECTION, SOLUTION INTRAMUSCULAR; INTRAVENOUS; SUBCUTANEOUS EVERY 5 MIN PRN
OUTPATIENT
Start: 2023-07-18 | End: 2023-07-18

## 2023-07-18 RX ORDER — HYDROMORPHONE HYDROCHLORIDE 1 MG/ML
0.2 INJECTION, SOLUTION INTRAMUSCULAR; INTRAVENOUS; SUBCUTANEOUS EVERY 5 MIN PRN
OUTPATIENT
Start: 2023-07-18 | End: 2023-07-18

## 2023-07-18 RX ORDER — ACETAMINOPHEN 500 MG
1000 TABLET ORAL ONCE AS NEEDED
OUTPATIENT
Start: 2023-07-18 | End: 2023-07-18

## 2023-07-18 RX ORDER — NALOXONE HYDROCHLORIDE 0.4 MG/ML
80 INJECTION, SOLUTION INTRAMUSCULAR; INTRAVENOUS; SUBCUTANEOUS AS NEEDED
OUTPATIENT
Start: 2023-07-18 | End: 2023-07-18

## 2023-07-18 RX ORDER — SODIUM CHLORIDE 9 MG/ML
INJECTION, SOLUTION INTRAVENOUS
Status: COMPLETED | OUTPATIENT
Start: 2023-07-19 | End: 2023-07-18

## 2023-07-18 RX ORDER — HYDROMORPHONE HYDROCHLORIDE 1 MG/ML
0.6 INJECTION, SOLUTION INTRAMUSCULAR; INTRAVENOUS; SUBCUTANEOUS EVERY 5 MIN PRN
OUTPATIENT
Start: 2023-07-18 | End: 2023-07-18

## 2023-07-18 RX ORDER — HYDROCODONE BITARTRATE AND ACETAMINOPHEN 5; 325 MG/1; MG/1
2 TABLET ORAL ONCE AS NEEDED
OUTPATIENT
Start: 2023-07-18 | End: 2023-07-18

## 2023-07-18 RX ORDER — LABETALOL HYDROCHLORIDE 5 MG/ML
10 INJECTION, SOLUTION INTRAVENOUS EVERY 10 MIN PRN
OUTPATIENT
Start: 2023-07-18

## 2023-07-18 RX ADMIN — SODIUM CHLORIDE: 9 INJECTION, SOLUTION INTRAVENOUS at 12:17:00

## 2023-07-18 RX ADMIN — SODIUM CHLORIDE: 9 INJECTION, SOLUTION INTRAVENOUS at 11:34:00

## 2023-07-18 NOTE — PROCEDURES
Cardiology Attempted Transesophageal Echo Note     PRE-PROCEDURE DIAGNOSIS:  Mitral regurgitation and tricuspid regurgitation     PROCEDURE: Transesophageal Echocardiogram.     SEDATION:   Topical spray x 1  Sedation provided by anesthesiologist, Dr. Nancy Landeros. DESCRIPTION:   Informed consent was obtained after risks and benefits of the procedure were explained. Oral hurricaine spray was placed in the oropharynx. Sedation was given. After adequate anesthesia, the SRAVANTHI probe was attempted to be placed in the oropharynx with the esophagus unable to be successfully intubated. Patient was sedated but significant resistance but muscle in neck not as tight as per recent prior attempt. Patient did have desaturations that took time with repositioning to get adequate saturations to attempt procedure. COMPLICATIONS: none     RECOMMENDATIONS:  If SRAVANTHI still indicated (will review with Dr. Lubna Warren), we can reattempt SRAVANTHI with cardiac anesthesiologist and gastroenterologist in the cardiac cath lab. Reviewed with patient and daughter at bedside after patient had recovered from procedure.     Jake Morton MD  7/18/2023  2:11 PM

## 2023-07-18 NOTE — PROGRESS NOTES
Md unable to pass probe. Procedure cancelled. Anesthesia at bedside. Vss POC reviewed w daughter. 1400 pt awake, alert. Tolerating po well. Discharge instructions reviewed w pt and daughter. Pt discharged to home in stable condition w daughter.

## 2023-07-18 NOTE — ANESTHESIA POSTPROCEDURE EVALUATION
[facility] Patient Status:  Outpatient   Age/Gender 76year old female MRN [id number]   [facility] Melvin Lorenz [facility]   Hosp Day # 0 PCP None Pcp       Anesthesia Post-op Note        Procedure Summary       Date: 07/18/23 Room / Location: [address] [place] [address]    Anesthesia Start: 1134 Anesthesia Stop: 4103    Procedure: CATH TRANSESOPHAGEAL ECHOCARDIOGRAM (SRAVANTHI) Diagnosis:       Mitral and aortic regurgitation      Mitral and aortic regurgitation    Scheduled Providers: Alethea White MD Anesthesiologist: Cornell Alvarez MD    Anesthesia Type: MAC ASA Status: 3            Anesthesia Type: MAC    Vitals Value Taken Time   BP 94/48 07/18/23 1217   Temp  07/18/23 1217   Pulse 61 07/18/23 1217   Resp 16 07/18/23 1217   SpO2 94 07/18/23 1217       Patient Location: Other: (Cath holding)    Anesthesia Type: MAC    Airway Patency: patent    Postop Pain Control: adequate    Mental Status: mildly sedated but able to meaningfully participate in the post-anesthesia evaluation    Nausea/Vomiting: none    Cardiopulmonary/Hydration status: stable euvolemic    Complications: no apparent anesthesia related complications    Postop vital signs: stable    Dental Exam: Unchanged from Preop    Patient to be discharged home when criteria met.

## 2024-06-03 ENCOUNTER — APPOINTMENT (OUTPATIENT)
Dept: CARDIOLOGY | Age: 79
End: 2024-06-03

## 2024-09-30 NOTE — PLAN OF CARE
Assumed care of pt at [address]. A/ox4, rm air, a-fib (murmur) w/ rates controlled in 70s on tele. No reports of pain. Very slight diminished expiratory wheezes auscultated on left breath sounds. +1 edema present in bilateral lower extremities & feet.  Pt receiv indicated  - Evaluate effectiveness of antiarrhythmic and heart rate control medications as ordered  - Initiate emergency measures for life threatening arrhythmias  - Monitor electrolytes and administer replacement therapy as ordered  Outcome: Progressing and/or skin breakdown  - Initiate interventions, skin care algorithm/standards of care as needed  Outcome: Progressing     Problem: HEMATOLOGIC - ADULT  Goal: Maintains hematologic stability  Description: INTERVENTIONS  - Assess for signs and symptoms of b No

## 2025-06-24 NOTE — H&P
Agree with H and P as written -no changes to physical exam. RHC and then SRAVANTHI to evaluate VHD from structural heart failure clinic standpoint.     Patient agrees to proceeding with RHC.     Harpal Vo MD   Advanced Heart Failure and Transplant Cardiology   [facility] [place] ([facility])

## 2025-07-07 NOTE — PAT NURSING NOTE
Spoke with pt regarding PAT call. She denied any difficulty swallowing, no loose teeth, has no dentures in place at this time. She had ventral hernia surgery 9/2024. [facility] message sent to make MD aware/see if there any concerns.        PreOp Instructions     You are scheduled for: a Cardiac Procedure     Date of Procedure: 07/09/25     Diet Instructions: Do not eat or drink anything after midnight including gum, mints, candy, etc.     Medications: Medications you are allowed to take can be taken with a sip of water the morning of your procedure.     Do not take the following Blood Thinner(s): DO NOT TAKE Warfarin the day before your procedure, your last dose will be on Monday 7/7.     Medications to Stop: DO NOT TAKE Sildenafil for 24 hours prior to procedure, your last dose will be on Tuesday 7/8 in the morning.     Other Medications: DO NOT TAKE any herbal supplements and vitamins the morning of your procedure.     Skin Prep : Shower with antibacterial soap using a clean washcloth, prior to procedure. Once dried off, no lotions/powders/creams/ointments, etc., Do not shave the procedure area, this will be completed at the hospital during the preparation phase.     Arrival Time: The day prior to your procedure you will receive a phone call between 3:00 pm - 6:00 pm with your arrival time. If you haven't received a phone call, please check your voicemail messages., If you did not receive a voice mail and it is after 6:00 pm, please call the nursing supervisor at [phone].    Driving After Procedure: Sedation will be given so you WILL NOT be able to drive home. You will need a responsible adult  to drive you home. You can NOT take uber or taxi unless approved by your physician in advance.     Discharge Teaching: Your nurse will give you specific instructions before discharge, Most people can resume normal activities in 2-3 days, Any questions, please call the physician's office            parking is  available starting at 6 am or [facility] the [facility]. Check in at the [facility] reception desk. Our  will be there to check you in for your procedure. Please bring your insurance cards and ID with you.                                                                                                                                      Please DO NOT respond to this message, the inbasket is not monitored for messages. For any questions, please call the physician's office.

## 2025-07-09 ENCOUNTER — HOSPITAL ENCOUNTER (OUTPATIENT)
Dept: INTERVENTIONAL RADIOLOGY/VASCULAR | Facility: HOSPITAL | Age: 80
Setting detail: OBSERVATION
End: 2025-07-09
Attending: STUDENT IN AN ORGANIZED HEALTH CARE EDUCATION/TRAINING PROGRAM

## 2025-07-09 ENCOUNTER — HOSPITAL ENCOUNTER (OUTPATIENT)
Dept: INTERVENTIONAL RADIOLOGY/VASCULAR | Facility: HOSPITAL | Age: 80
Setting detail: OBSERVATION
Discharge: ANOTHER HOSPITAL | End: 2025-07-15
Attending: STUDENT IN AN ORGANIZED HEALTH CARE EDUCATION/TRAINING PROGRAM | Admitting: STUDENT IN AN ORGANIZED HEALTH CARE EDUCATION/TRAINING PROGRAM

## 2025-07-09 ENCOUNTER — HOSPITAL ENCOUNTER (INPATIENT)
Dept: INTERVENTIONAL RADIOLOGY/VASCULAR | Facility: HOSPITAL | Age: 80
LOS: 6 days | Discharge: ANOTHER HOSPITAL | End: 2025-07-15
Attending: STUDENT IN AN ORGANIZED HEALTH CARE EDUCATION/TRAINING PROGRAM | Admitting: STUDENT IN AN ORGANIZED HEALTH CARE EDUCATION/TRAINING PROGRAM

## 2025-07-09 ENCOUNTER — APPOINTMENT (OUTPATIENT)
Dept: GENERAL RADIOLOGY | Facility: HOSPITAL | Age: 80
End: 2025-07-09
Attending: NURSE PRACTITIONER

## 2025-07-09 ENCOUNTER — HOSPITAL ENCOUNTER (OUTPATIENT)
Dept: INTERVENTIONAL RADIOLOGY/VASCULAR | Facility: HOSPITAL | Age: 80
Discharge: ANOTHER HOSPITAL | End: 2025-07-15
Attending: STUDENT IN AN ORGANIZED HEALTH CARE EDUCATION/TRAINING PROGRAM | Admitting: STUDENT IN AN ORGANIZED HEALTH CARE EDUCATION/TRAINING PROGRAM

## 2025-07-09 VITALS
RESPIRATION RATE: 20 BRPM | OXYGEN SATURATION: 95 % | DIASTOLIC BLOOD PRESSURE: 79 MMHG | SYSTOLIC BLOOD PRESSURE: 120 MMHG | HEART RATE: 75 BPM

## 2025-07-09 DIAGNOSIS — I07.1 TRICUSPID REGURGITATION: ICD-10-CM

## 2025-07-09 DIAGNOSIS — I34.0 MITRAL REGURGITATION: ICD-10-CM

## 2025-07-09 PROBLEM — I50.9 CHRONIC CONGESTIVE HEART FAILURE (HCC): Status: ACTIVE | Noted: 2025-07-09

## 2025-07-09 PROBLEM — R13.10 DYSPHAGIA: Status: ACTIVE | Noted: 2025-07-09

## 2025-07-09 LAB
ALBUMIN SERPL-MCNC: 4.5 G/DL (ref 3.2–4.8)
ALBUMIN/GLOB SERPL: 1.3 (ref 1–2)
ALP LIVER SERPL-CCNC: 80 U/L (ref 55–142)
ALT SERPL-CCNC: 10 U/L (ref 10–49)
ANION GAP SERPL CALC-SCNC: 3 MMOL/L (ref 0–18)
AST SERPL-CCNC: 27 U/L (ref ?–34)
BASOPHILS # BLD AUTO: 0.02 X10(3) UL (ref 0–0.2)
BASOPHILS NFR BLD AUTO: 0.1 %
BILIRUB SERPL-MCNC: 1 MG/DL (ref 0.2–1.1)
BUN BLD-MCNC: 21 MG/DL (ref 9–23)
CALCIUM BLD-MCNC: 9.7 MG/DL (ref 8.7–10.6)
CHLORIDE SERPL-SCNC: 103 MMOL/L (ref 98–112)
CO2 SERPL-SCNC: 34 MMOL/L (ref 21–32)
CREAT BLD-MCNC: 1.47 MG/DL (ref 0.55–1.02)
EGFRCR SERPLBLD CKD-EPI 2021: 36 ML/MIN/1.73M2 (ref 60–?)
EOSINOPHIL # BLD AUTO: 0.01 X10(3) UL (ref 0–0.7)
EOSINOPHIL NFR BLD AUTO: 0.1 %
ERYTHROCYTE [DISTWIDTH] IN BLOOD BY AUTOMATED COUNT: 14.9 %
GLOBULIN PLAS-MCNC: 3.5 G/DL (ref 2–3.5)
GLUCOSE BLD-MCNC: 147 MG/DL (ref 70–99)
HCT VFR BLD AUTO: 39.9 % (ref 35–48)
HGB BLD-MCNC: 12.7 G/DL (ref 12–16)
IMM GRANULOCYTES # BLD AUTO: 0.06 X10(3) UL (ref 0–1)
IMM GRANULOCYTES NFR BLD: 0.4 %
LYMPHOCYTES # BLD AUTO: 0.39 X10(3) UL (ref 1–4)
LYMPHOCYTES NFR BLD AUTO: 2.6 %
MCH RBC QN AUTO: 27.7 PG (ref 26–34)
MCHC RBC AUTO-ENTMCNC: 31.8 G/DL (ref 31–37)
MCV RBC AUTO: 86.9 FL (ref 80–100)
MONOCYTES # BLD AUTO: 0.93 X10(3) UL (ref 0.1–1)
MONOCYTES NFR BLD AUTO: 6.3 %
NEUTROPHILS # BLD AUTO: 13.34 X10 (3) UL (ref 1.5–7.7)
NEUTROPHILS # BLD AUTO: 13.34 X10(3) UL (ref 1.5–7.7)
NEUTROPHILS NFR BLD AUTO: 90.5 %
OSMOLALITY SERPL CALC.SUM OF ELEC: 296 MOSM/KG (ref 275–295)
PLATELET # BLD AUTO: 372 10(3)UL (ref 150–450)
POTASSIUM SERPL-SCNC: 4.6 MMOL/L (ref 3.5–5.1)
PROT SERPL-MCNC: 8 G/DL (ref 5.7–8.2)
RBC # BLD AUTO: 4.59 X10(6)UL (ref 3.8–5.3)
SODIUM SERPL-SCNC: 140 MMOL/L (ref 136–145)
WBC # BLD AUTO: 14.8 X10(3) UL (ref 4–11)

## 2025-07-09 PROCEDURE — 99223 1ST HOSP IP/OBS HIGH 75: CPT | Performed by: INTERNAL MEDICINE

## 2025-07-09 PROCEDURE — 71045 X-RAY EXAM CHEST 1 VIEW: CPT | Performed by: NURSE PRACTITIONER

## 2025-07-09 RX ORDER — LIDOCAINE HYDROCHLORIDE 10 MG/ML
INJECTION, SOLUTION EPIDURAL; INFILTRATION; INTRACAUDAL; PERINEURAL
Status: COMPLETED
Start: 2025-07-09 | End: 2025-07-09

## 2025-07-09 RX ORDER — LISINOPRIL 10 MG/1
10 TABLET ORAL DAILY
Status: DISCONTINUED | OUTPATIENT
Start: 2025-07-10 | End: 2025-07-15

## 2025-07-09 RX ORDER — HEPARIN SODIUM 5000 [USP'U]/ML
INJECTION, SOLUTION INTRAVENOUS; SUBCUTANEOUS
Status: COMPLETED
Start: 2025-07-09 | End: 2025-07-09

## 2025-07-09 RX ORDER — ACETAMINOPHEN 325 MG/1
650 TABLET ORAL EVERY 6 HOURS PRN
Status: DISCONTINUED | OUTPATIENT
Start: 2025-07-09 | End: 2025-07-14

## 2025-07-09 RX ORDER — CARVEDILOL 3.12 MG/1
6.25 TABLET ORAL 2 TIMES DAILY WITH MEALS
Status: DISCONTINUED | OUTPATIENT
Start: 2025-07-09 | End: 2025-07-11

## 2025-07-09 RX ORDER — BUMETANIDE 0.25 MG/ML
2 INJECTION, SOLUTION INTRAMUSCULAR; INTRAVENOUS ONCE
Status: COMPLETED | OUTPATIENT
Start: 2025-07-09 | End: 2025-07-09

## 2025-07-09 RX ORDER — SILDENAFIL CITRATE 20 MG/1
20 TABLET ORAL 3 TIMES DAILY
Status: DISCONTINUED | OUTPATIENT
Start: 2025-07-09 | End: 2025-07-11

## 2025-07-09 RX ORDER — DIGOXIN 125 MCG
125 TABLET ORAL DAILY
Status: DISCONTINUED | OUTPATIENT
Start: 2025-07-10 | End: 2025-07-15

## 2025-07-09 RX ORDER — FUROSEMIDE 10 MG/ML
80 INJECTION INTRAMUSCULAR; INTRAVENOUS
Status: DISCONTINUED | OUTPATIENT
Start: 2025-07-09 | End: 2025-07-11

## 2025-07-09 RX ORDER — WARFARIN SODIUM 2 MG/1
2 TABLET ORAL NIGHTLY
Status: DISCONTINUED | OUTPATIENT
Start: 2025-07-09 | End: 2025-07-11

## 2025-07-09 RX ORDER — TRAMADOL HYDROCHLORIDE 50 MG/1
50 TABLET ORAL EVERY 6 HOURS PRN
Status: DISCONTINUED | OUTPATIENT
Start: 2025-07-09 | End: 2025-07-14

## 2025-07-09 RX ORDER — MIDAZOLAM HYDROCHLORIDE 1 MG/ML
INJECTION INTRAMUSCULAR; INTRAVENOUS
Status: COMPLETED
Start: 2025-07-09 | End: 2025-07-09

## 2025-07-09 RX ORDER — ACETAMINOPHEN 325 MG/1
TABLET ORAL
Status: COMPLETED
Start: 2025-07-09 | End: 2025-07-09

## 2025-07-09 RX ORDER — SODIUM CHLORIDE 9 MG/ML
INJECTION, SOLUTION INTRAVENOUS
Status: COMPLETED | OUTPATIENT
Start: 2025-07-09 | End: 2025-07-09

## 2025-07-09 RX ORDER — ACETAMINOPHEN 325 MG/1
650 TABLET ORAL EVERY 6 HOURS PRN
Status: ACTIVE | OUTPATIENT
Start: 2025-07-09

## 2025-07-09 RX ADMIN — CARVEDILOL 6.25 MG: 3.12 TABLET ORAL at 17:13:00

## 2025-07-09 RX ADMIN — SODIUM CHLORIDE: 9 INJECTION, SOLUTION INTRAVENOUS at 09:11:00

## 2025-07-09 RX ADMIN — TRAMADOL HYDROCHLORIDE 50 MG: 50 TABLET ORAL at 17:12:00

## 2025-07-09 RX ADMIN — BUMETANIDE 2 MG: 0.25 INJECTION, SOLUTION INTRAMUSCULAR; INTRAVENOUS at 18:36:00

## 2025-07-09 RX ADMIN — ACETAMINOPHEN 650 MG: 325 TABLET ORAL at 13:25:00

## 2025-07-09 RX ADMIN — SILDENAFIL CITRATE 20 MG: 20 TABLET ORAL at 16:34:00

## 2025-07-09 RX ADMIN — FUROSEMIDE 80 MG: 10 INJECTION INTRAMUSCULAR; INTRAVENOUS at 16:19:00

## 2025-07-09 RX ADMIN — MIDAZOLAM HYDROCHLORIDE 6 MG: 1 INJECTION INTRAMUSCULAR; INTRAVENOUS at 12:03:00

## 2025-07-09 RX ADMIN — WARFARIN SODIUM 2 MG: 2 TABLET ORAL at 20:31:00

## 2025-07-09 RX ADMIN — ACETAMINOPHEN 650 MG: 325 TABLET ORAL at 21:22:00

## 2025-07-09 RX ADMIN — SILDENAFIL CITRATE 20 MG: 20 TABLET ORAL at 20:31:00

## 2025-07-09 NOTE — PROCEDURES
Cardiology Transesophageal Echo Note    PRE-PROCEDURE DIAGNOSIS: MR/TR - severe    PROCEDURE: Transesophageal Echocardiogram.    SEDATION:   Topical spray x 1  Versed: 6 mg  Fentanyl: 125 mcg    I personally supervised the intravenous administration of   conscious sedation.  This was performed with continuous   respiratory, hemodynamic, and electrocardiographic monitoring.    Sedation was supervised from 11:49 AM - 12:08 AM, a total of 19 minutes.      DESCRIPTION:   Informed consent was obtained after risks and benefits of the procedure were explained. Oral hurricaine spray was placed in the oropharynx. Conscious sedation was given.  Despite increased dosing of versed/fentanyl we could not adequate sedate the patient for passage of SRAVANTHI probe into the esophagus.      I explained to the daughter that general anesthesia is required in such instances.  Will plan to bring Mrs. Schwab back and perform SRAVANTHI with help from anesthesia colleagues.    COMPLICATIONS: none        Domingo Nair MD  7/9/2025  11:50 AM

## 2025-07-09 NOTE — PROGRESS NOTES
Pt received s/p RHC with Dr. Vo. Right neck dressing CDI, no bleeding or swelling present. Bedside SRAVANTHI done with Dr. Nair. Unable to pass probe into esophagus. Procedure aborted. Small amt bloody sputum suctioned from mouth and oral airway after procedure aborted. Pt c/o neck/throat pain and inability to swallow. Dr. Nair notified, order received for tylenol dose and admit overnight. Pt able to swallow tabs of tylenol after great effort by pt. Dr. Singh at bedside for hospitalist consult.     Report called to CHAPARRITA Ordaz on CTU 2. Pt transferred via wheelchair to 261.

## 2025-07-09 NOTE — CONSULTS
[facility]  Report of Consultation    Lizzy Schwab Patient Status:  Outpatient    1945 MRN [id number]   [facility] INTERVENTIONAL SUITES Attending Harpal Vo MD   Hosp Day # 0 PCP None Pcp     Reason for Consultation:  Medical management    REFERRING PHYSICIAN: Dr. Vo/Dr. Nair    Chief Complaint: Being admitted for observation by cardiology status post cardiac angiogram right heart cath And status post attempted SRAVANTHI     History of Present Illness:  Lizzy Schwab is a  79 year old female admitted for observation by cardiology status post cardiac angiogram right heart cath And status post attempted SRAVANTHI.  Discussed with cardiology Dr. Nair regarding the consult.  Patient has some dysphagia after the attempted SRAVANTHI and SRAVANTHI could not be done as reported by cardiology Dr. Nair.  Patient  has mild pain in the right neck at the right heart cath access site, has complaints of mild right-sided chest pain.  Dysphagia present which is improving.  No nausea vomiting.  Denies any abdominal pain.  Patient seen and examined in Cath Lab holding area for the consult, patient being admitted to  per cardiology for overnight observation  Patient seen with patient's daughter at bedside.    History:  Past Medical History:    Arrhythmia    Atrial fibrillation (HCC)    Congestive heart disease (HCC)    High blood pressure    High cholesterol    Renal disorder    Unspecified essential hypertension     Past Surgical History:   Procedure Laterality Date    Hernia surgery      2024     Family History   Problem Relation Age of Onset    Heart Disorder Father     Cancer Sister         leukemia    Cancer Brother         prostate cancer     Social History:   reports that she quit smoking about 31 years ago. Her smoking use included cigarettes. She started smoking about 46 years ago. She has a 1.5 pack-year smoking history. She has never used smokeless tobacco. She reports current alcohol use. She  reports that she does not use drugs.    Allergies:  No Known Allergies    Medications:  No current facility-administered medications for this encounter.    Facility-Administered Medications Ordered in Other Encounters:     acetaminophen (Tylenol) tab 650 mg, 650 mg, Oral, Q6H PRN    Review of Systems:  A comprehensive 14 point review of systems was completed.  Pertinent positives and negatives noted in the the HPI.    Physical Exam:  Vital signs: Blood pressure 142/71, pulse 70, temperature 97.8 °F (36.6 °C), temperature source Temporal, resp. rate 24, height 5' 8\" (1.727 m), weight 187 lb (84.8 kg), SpO2 98%.  General: No acute distress.   HEENT: Moist mucous membranes. EOM-I. PERRL  Neck: Right-sided neck right heart cath access site in dressing and dressing looks dry.  Respiratory: Clear to auscultation bilaterally.  No wheezes. No rhonchi.  Cardiovascular: S1, S2.  Regular rate and rhythm.    Abdomen: Soft, nontender, nondistended.  Positive bowel sounds.   Neurologic: Awake alert oriented, no focal neurological deficits.  Musculoskeletal: Full range of motion of all extremities.  No pedal edema or calf tenderness  Psychiatric: Appropriate mood and affect.    Laboratory Data:     Will order CBC and BMP    ASSESSMENT / PLAN:   Admitted for observation by cardiology status post cardiac angiogram right heart cath And status post attempted SRAVANTHI-managed by cardiology    Transient dysphagia  Discussed with cardiology, will observe overnight  Patient has no crepitus on exam.  No pharyngeal erythema.  Denies any heartburns.  Hypertension  Follow-up blood pressure  Patient on Coreg at home which we will plan to continue  Atrial fibrillation  Monitor on telemetry  On chart review patient on Coreg, digoxin, warfarin at home  Chronic CHF  Cardiology following  Cardiac medications per cardiology.  On chart review patient on Coreg, lisinopril, digoxin, torsemide  CKD stage III  this was discussed with patient and patient's  daughter at bedside and looked at old labs in Care Everywhere from 2021 and 2024 and reviewed this with patient and daughter      Quality:  DVT Prophylaxis: SCD.  Early ambulation  CODE status: Full  Bridges: No    Plan of care discussed with patient, patient's daughter at bedside.  Discussed with cardiology Dr. Nair        Thank you for allowing me to participate in the care of your patient. Will continue to follow while in hospital.     Cammy Singh MD  7/9/2025  2:18 PM

## 2025-07-09 NOTE — PLAN OF CARE
Admission Navigator and PTA med list completed with patient at the bedside  Patient's daughter at the bedside  Patient uses a CPAP at home. Reading glasses at the bedside  Patient lives with grandson

## 2025-07-09 NOTE — DISCHARGE INSTRUCTIONS
HOME CARE INSTRUCTIONS FOLLOWING VENOUS ACCESS PROCEDURES:   MYOCARDIAL BIOPSY, RIGHT HEART CATHETERIZATION, VENAGRAM, IVC FILTER INSERTION, CLOSURE OF ASD OR PFO     Activity    DO NOT drive after the procedure. You may resume driving the following day according to the nurse or physician's instructions    Plan on resting and relaxing tonight and tomorrow    Do not lift anything over 10 pounds for the next 24 hours    Avoid sexual activity for the next 24 hours    Avoid drinking alcohol for the next 24 hours    Resume your normal activity after 24 hours, or as instructed by your physician     What is Normal?    The procedure site may appear bruised or discolored    The procedure site may be tender to the touch    There may be a small amount of drainage on the bandage     Special Instructions    The bandage is to remain in place for 24 hours    After 24 hours, you must remove the bandage. You should shower after removing the bandage, and wash the procedure site gently with soap and water. (If you choose to wear a bandage for a few days, make sure it remains clean and dry and that it is changed daily.)     When you should NOTIFY YOUR PHYSICIAN    If you have shortness of breath or a persistent cough    If you have chest pain (angina)    If you have palpitations or irregular heart beats    If you have persistent pain at the procedure site    If you experience signs of a fever, temperature >101 degrees, chills, infection (redness, swelling, thick yellow drainage, or a foul odor from the procedure site)     Other    You may resume your present diet, unless otherwise specified by your physician    You may resume all of your medications as prescribed, unless otherwise directed by your physician. A list of your medications was provided to you at discharge      Do not make any personal/business decisions and/or sign any legal documents for the next 24 hours.    Home Care Instructions Following   Transesophageal  Echocardiography (SRAVANTHI)    Activity  -DO NOT drive after the procedure. You may resume driving the following day  -Do not operate any machinery (including kitchen appliances and power tools)  -Avoid drinking alcohol for 24 hours  -You may resume your normal activities tomorrow  -Sip cool liquids initially and advance to a soft diet tonight    What is normal  -You may experience a sore throat for 2 to 3 days following the examination  -Gargling with warm salt water (1/2 teaspoon of salt to 8 oz. of warm water) or using throat lozenges will help to soothe your throat    When you should NOTIFY YOUR PHYSICIAN  -If you experience sharp pain in your neck, abdomen, or chest  -If you have sudden nausea and/or vomiting  -If you vomit blood  -If you have a fever greater than 100 degrees    Other  -You may resume your present diet, unless otherwise specified by your physician  -You may resume all of your medications as prescribed, unless otherwise directed by your physician. A list of your medications was provided to you at discharge

## 2025-07-09 NOTE — PLAN OF CARE
Assumed care of patient from Cath lab RN. Patient c/o throbbing pain radiated from puncture site of R-jugular site down to chest, c/o shortness of breath, RN requested ROSA Mistry to assess patient at bedside, see orders. Patient is AFIB rhythm on tele, rates controlled, Desats with sleep, lung sounds diminished with crackles bilaterally, JVD to R side present. Access site intact, no bleeding, Swelling to bilateral lower extremities. RN paged Cards APN Belen, with concern for BP being soft, per APN, given bumex as ordered. GI consult paged to make aware of consult. Patient swallowing without difficulty.     POC: IV lasix, GI consult. I&O, daily weight.

## 2025-07-09 NOTE — PROCEDURES
Right Heart Catheterization    Procedures:    Right Heart Catheterization    Procedure details:    After consent obtained from patient, patient was brought to the cardiac catheterization lab. Patient was prepped and draped in usual sterile fashion. Lidocaine 1% was used to infiltrate site of access. Access was obtained with ultrasound guidance. Ultrasound guide and needle kit utilized to access site, and advanced sheath using modified seldinger technique.     RHC was performed using swan-brayan catheter and cardiac output using RAMANA equation was also performed.  See below for data.       Indication:  VHD, HFpEF    Access Site: Right Internal Jugular Vein      Sheath Size (Fr): 7    Complications:  None apparent.     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 Updated family at bedside (daughter). Continue current therapy, will plan to increase diuretic at office visit to optimize hemodynamics and volume status. Has SRAVANTHI upcoming for further evaluation of VHD.     Harpal Vo M.D.  Advanced Heart Failure and Transplant Cardiology

## 2025-07-10 ENCOUNTER — APPOINTMENT (OUTPATIENT)
Dept: GENERAL RADIOLOGY | Facility: HOSPITAL | Age: 80
End: 2025-07-10
Attending: INTERNAL MEDICINE

## 2025-07-10 PROBLEM — N17.9 ACUTE KIDNEY INJURY: Status: ACTIVE | Noted: 2025-07-10

## 2025-07-10 PROBLEM — J02.9 ACUTE PHARYNGITIS: Status: ACTIVE | Noted: 2025-07-10

## 2025-07-10 PROBLEM — J18.9 PNEUMONIA OF RIGHT LOWER LOBE DUE TO INFECTIOUS ORGANISM: Status: ACTIVE | Noted: 2025-07-10

## 2025-07-10 PROBLEM — R59.0 ANTERIOR CERVICAL LYMPHADENOPATHY: Status: ACTIVE | Noted: 2025-07-10

## 2025-07-10 PROBLEM — N18.30 STAGE 3 CHRONIC KIDNEY DISEASE (HCC): Status: ACTIVE | Noted: 2025-07-10

## 2025-07-10 PROBLEM — I50.33 ACUTE ON CHRONIC HEART FAILURE WITH PRESERVED EJECTION FRACTION (HCC): Status: ACTIVE | Noted: 2025-07-10

## 2025-07-10 LAB
ADENOVIRUS PCR:: NOT DETECTED
ALBUMIN SERPL-MCNC: 4.7 G/DL (ref 3.2–4.8)
ALBUMIN/GLOB SERPL: 1.3 (ref 1–2)
ALP LIVER SERPL-CCNC: 70 U/L (ref 55–142)
ALT SERPL-CCNC: 10 U/L (ref 10–49)
ANION GAP SERPL CALC-SCNC: 3 MMOL/L (ref 0–18)
ANION GAP SERPL CALC-SCNC: 5 MMOL/L (ref 0–18)
AST SERPL-CCNC: 32 U/L (ref ?–34)
B PARAPERT DNA SPEC QL NAA+PROBE: NOT DETECTED
B PERT DNA SPEC QL NAA+PROBE: NOT DETECTED
BASOPHILS # BLD AUTO: 0.01 X10(3) UL (ref 0–0.2)
BASOPHILS # BLD AUTO: 0.02 X10(3) UL (ref 0–0.2)
BASOPHILS NFR BLD AUTO: 0.1 %
BASOPHILS NFR BLD AUTO: 0.1 %
BILIRUB SERPL-MCNC: 0.6 MG/DL (ref 0.2–1.1)
BUN BLD-MCNC: 25 MG/DL (ref 9–23)
BUN BLD-MCNC: 26 MG/DL (ref 9–23)
C PNEUM DNA SPEC QL NAA+PROBE: NOT DETECTED
CALCIUM BLD-MCNC: 9.7 MG/DL (ref 8.7–10.6)
CALCIUM BLD-MCNC: 9.9 MG/DL (ref 8.7–10.6)
CHLORIDE SERPL-SCNC: 102 MMOL/L (ref 98–112)
CHLORIDE SERPL-SCNC: 99 MMOL/L (ref 98–112)
CO2 SERPL-SCNC: 33 MMOL/L (ref 21–32)
CO2 SERPL-SCNC: 36 MMOL/L (ref 21–32)
CORONAVIRUS 229E PCR:: NOT DETECTED
CORONAVIRUS HKU1 PCR:: NOT DETECTED
CORONAVIRUS NL63 PCR:: NOT DETECTED
CORONAVIRUS OC43 PCR:: NOT DETECTED
CREAT BLD-MCNC: 1.77 MG/DL (ref 0.55–1.02)
CREAT BLD-MCNC: 2.01 MG/DL (ref 0.55–1.02)
EGFRCR SERPLBLD CKD-EPI 2021: 24 ML/MIN/1.73M2 (ref 60–?)
EGFRCR SERPLBLD CKD-EPI 2021: 29 ML/MIN/1.73M2 (ref 60–?)
EOSINOPHIL # BLD AUTO: 0 X10(3) UL (ref 0–0.7)
EOSINOPHIL # BLD AUTO: 0 X10(3) UL (ref 0–0.7)
EOSINOPHIL NFR BLD AUTO: 0 %
EOSINOPHIL NFR BLD AUTO: 0 %
ERYTHROCYTE [DISTWIDTH] IN BLOOD BY AUTOMATED COUNT: 15 %
ERYTHROCYTE [DISTWIDTH] IN BLOOD BY AUTOMATED COUNT: 15.1 %
FLUAV RNA SPEC QL NAA+PROBE: NOT DETECTED
FLUBV RNA SPEC QL NAA+PROBE: NOT DETECTED
GLOBULIN PLAS-MCNC: 3.7 G/DL (ref 2–3.5)
GLUCOSE BLD-MCNC: 120 MG/DL (ref 70–99)
GLUCOSE BLD-MCNC: 121 MG/DL (ref 70–99)
HCT VFR BLD AUTO: 41.9 % (ref 35–48)
HCT VFR BLD AUTO: 41.9 % (ref 35–48)
HGB BLD-MCNC: 12.8 G/DL (ref 12–16)
HGB BLD-MCNC: 12.9 G/DL (ref 12–16)
IMM GRANULOCYTES # BLD AUTO: 0.15 X10(3) UL (ref 0–1)
IMM GRANULOCYTES # BLD AUTO: 0.18 X10(3) UL (ref 0–1)
IMM GRANULOCYTES NFR BLD: 0.8 %
IMM GRANULOCYTES NFR BLD: 0.9 %
INR BLD: 2 (ref 0.8–1.2)
L PNEUMO AG UR QL: NEGATIVE
LACTATE SERPL-SCNC: 1.3 MMOL/L (ref 0.5–2)
LYMPHOCYTES # BLD AUTO: 0.49 X10(3) UL (ref 1–4)
LYMPHOCYTES # BLD AUTO: 0.62 X10(3) UL (ref 1–4)
LYMPHOCYTES NFR BLD AUTO: 2.6 %
LYMPHOCYTES NFR BLD AUTO: 3.1 %
MCH RBC QN AUTO: 27.2 PG (ref 26–34)
MCH RBC QN AUTO: 27.4 PG (ref 26–34)
MCHC RBC AUTO-ENTMCNC: 30.5 G/DL (ref 31–37)
MCHC RBC AUTO-ENTMCNC: 30.8 G/DL (ref 31–37)
MCV RBC AUTO: 89 FL (ref 80–100)
MCV RBC AUTO: 89 FL (ref 80–100)
METAPNEUMOVIRUS PCR:: NOT DETECTED
MONOCYTES # BLD AUTO: 1.15 X10(3) UL (ref 0.1–1)
MONOCYTES # BLD AUTO: 1.41 X10(3) UL (ref 0.1–1)
MONOCYTES NFR BLD AUTO: 6.2 %
MONOCYTES NFR BLD AUTO: 7.1 %
MYCOPLASMA PNEUMONIA PCR:: NOT DETECTED
NEUTROPHILS # BLD AUTO: 16.71 X10 (3) UL (ref 1.5–7.7)
NEUTROPHILS # BLD AUTO: 16.71 X10(3) UL (ref 1.5–7.7)
NEUTROPHILS # BLD AUTO: 17.77 X10 (3) UL (ref 1.5–7.7)
NEUTROPHILS # BLD AUTO: 17.77 X10(3) UL (ref 1.5–7.7)
NEUTROPHILS NFR BLD AUTO: 88.8 %
NEUTROPHILS NFR BLD AUTO: 90.3 %
NT-PROBNP SERPL-MCNC: 5503 PG/ML (ref ?–450)
OSMOLALITY SERPL CALC.SUM OF ELEC: 292 MOSM/KG (ref 275–295)
OSMOLALITY SERPL CALC.SUM OF ELEC: 296 MOSM/KG (ref 275–295)
PARAINFLUENZA 1 PCR:: NOT DETECTED
PARAINFLUENZA 2 PCR:: NOT DETECTED
PARAINFLUENZA 3 PCR:: NOT DETECTED
PARAINFLUENZA 4 PCR:: NOT DETECTED
PLATELET # BLD AUTO: 380 10(3)UL (ref 150–450)
PLATELET # BLD AUTO: 383 10(3)UL (ref 150–450)
POTASSIUM SERPL-SCNC: 4.4 MMOL/L (ref 3.5–5.1)
POTASSIUM SERPL-SCNC: 4.6 MMOL/L (ref 3.5–5.1)
PROCALCITONIN SERPL-MCNC: 11.21 NG/ML (ref ?–0.05)
PROT SERPL-MCNC: 8.4 G/DL (ref 5.7–8.2)
PROTHROMBIN TIME: 22.9 SECONDS (ref 11.6–14.8)
RBC # BLD AUTO: 4.71 X10(6)UL (ref 3.8–5.3)
RBC # BLD AUTO: 4.71 X10(6)UL (ref 3.8–5.3)
RHINOVIRUS/ENTERO PCR:: NOT DETECTED
RSV RNA SPEC QL NAA+PROBE: NOT DETECTED
SARS-COV-2 RNA NPH QL NAA+NON-PROBE: NOT DETECTED
SODIUM SERPL-SCNC: 138 MMOL/L (ref 136–145)
SODIUM SERPL-SCNC: 140 MMOL/L (ref 136–145)
WBC # BLD AUTO: 18.5 X10(3) UL (ref 4–11)
WBC # BLD AUTO: 20 X10(3) UL (ref 4–11)

## 2025-07-10 PROCEDURE — 99152 MOD SED SAME PHYS/QHP 5/>YRS: CPT | Performed by: INTERNAL MEDICINE

## 2025-07-10 PROCEDURE — 74246 X-RAY XM UPR GI TRC 2CNTRST: CPT | Performed by: INTERNAL MEDICINE

## 2025-07-10 PROCEDURE — 99223 1ST HOSP IP/OBS HIGH 75: CPT | Performed by: INTERNAL MEDICINE

## 2025-07-10 PROCEDURE — 99233 SBSQ HOSP IP/OBS HIGH 50: CPT | Performed by: INTERNAL MEDICINE

## 2025-07-10 PROCEDURE — 93312 ECHO TRANSESOPHAGEAL: CPT | Performed by: INTERNAL MEDICINE

## 2025-07-10 RX ADMIN — ACETAMINOPHEN 650 MG: 325 TABLET ORAL at 05:00:00

## 2025-07-10 RX ADMIN — FUROSEMIDE 80 MG: 10 INJECTION INTRAMUSCULAR; INTRAVENOUS at 18:25:00

## 2025-07-10 RX ADMIN — SILDENAFIL CITRATE 20 MG: 20 TABLET ORAL at 08:18:00

## 2025-07-10 RX ADMIN — ACETAMINOPHEN 650 MG: 325 TABLET ORAL at 21:03:00

## 2025-07-10 RX ADMIN — DIGOXIN 125 MCG: 125 MCG TABLET ORAL at 08:18:00

## 2025-07-10 RX ADMIN — SILDENAFIL CITRATE 20 MG: 20 TABLET ORAL at 18:25:00

## 2025-07-10 RX ADMIN — CARVEDILOL 6.25 MG: 3.12 TABLET ORAL at 08:18:00

## 2025-07-10 RX ADMIN — TRAMADOL HYDROCHLORIDE 50 MG: 50 TABLET ORAL at 11:07:00

## 2025-07-10 RX ADMIN — WARFARIN SODIUM 2 MG: 2 TABLET ORAL at 21:00:00

## 2025-07-10 RX ADMIN — LISINOPRIL 10 MG: 10 TABLET ORAL at 08:18:00

## 2025-07-10 RX ADMIN — CARVEDILOL 6.25 MG: 3.12 TABLET ORAL at 18:25:00

## 2025-07-10 RX ADMIN — FUROSEMIDE 80 MG: 10 INJECTION INTRAMUSCULAR; INTRAVENOUS at 08:17:00

## 2025-07-10 NOTE — CONSULTS
[facility]                       Gastroenterology Consultation-[facility]    Lizzy Schwab Patient Status:  Inpatient    1945 MRN [id number]   Location [facility] 2NE-A Attending Harpal Vo MD   Hosp Day # 1 PCP None Pcp         Reason for consultation: failed SRAVANTHI  HPI: Ms. Schwab is an 78 yo F with A fib, CHF, who presented for cardiac angiogram and s/p attempted SRAVANTHI. Post procedure she has neck pain and a sore throat. She also notes some discomfort with breathing.  Her daughter reports that she has a strong gag reflex and in the past they have had trouble with getting SRAVANTHI probe down. They were able to get it down with general anesthesia in the past. She does not have a h/o dysphagia. She has not had a recent colonoscopy and has not had an EGD. She denies melena, hematochezia.   PMHx: Past Medical History[1]  PSHx: Past Surgical History[2]  Medications: Current Medications[3]  Allergies: Allergies[4]  SocHx:  quit smoking 40 years ago;  The patient drinks alcohol on social occasions; The patient has no history of IV drug use or other illicit substances.  FamHx: The patient has no family history of colon cancer or other gastrointestinal malignancies;  No family history of ulcer disease, or inflammatory bowel disease  ROS:  In addition to the pertinent positives described above:            Infectious Disease:  No chronic infections or recent fevers prior to the acute illness            Cardiovascular: see hpi            Respiratory: see hpi            Hematologic: The patient reports no easy bruising, frequent gum bleeding or nose bleeding;  The patient has no history of known chronic anemia            Dermatologic: The patient reports no recent rashes or chronic skin disorders            Rheumatologic: The patient reports no history of chronic arthritis, myalgias, arthralgias            Genitourinary:  The patient reports no history of recurrent urinary tract  infections, hematuria, dysuria, or nephrolithiasis           Psychiatric: The patient reports no history of depression, anxiety, suicidal ideation, or homicidal ideation           Oncologic: The patient reports on history of prior solid tumor or hematologic malignancy           ENT: The patient reports no hoarseness of voice, hearing loss, sinus congestion, tinnitus           Neurologic: The patient reports no history of seizure, stroke, or frequent headaches    PE: /61 (BP Location: Left arm)   Pulse 65   Temp 97.8 °F (36.6 °C) (Oral)   Resp 20   Ht 5' 8\" (1.727 m)   Wt 188 lb 4.4 oz (85.4 kg)   LMP  (LMP Unknown)   SpO2 90%   BMI 28.63 kg/m²   Gen: AAO x 3, able to speak in complete sentences  HENT: NCAT, EOMI, PERRL, oropharynx is clear with moist mucosal membranes  Eyes: Sclerae are anicteric  Neck:  Supple without nuchal rigidity; No lymphadenopathy. No obvious crepitus  CV: Regular rate, with normal S1 and S2; +murmur  Resp: No increased respiratory effort  Abdomen: Soft, non-tender, non-distended with the presence of bowel sounds; No hepatosplenomegaly; no rebound or guarding; No ascites is clinically apparent; no tympany to percussion  Ext: No peripheral edema or cyanosis  Skin: Warm and dry  Psychiatric: Appropriate mood and congruent affect without obvious depression or anxiety  Labs:   Lab Results   Component Value Date    WBC 20.0 07/10/2025    HGB 12.9 07/10/2025    HCT 41.9 07/10/2025    .0 07/10/2025    CREATSERUM 1.77 07/10/2025    BUN 26 07/10/2025     07/10/2025    K 4.6 07/10/2025    CL 99 07/10/2025    CO2 36.0 07/10/2025     07/10/2025    CA 9.7 07/10/2025    ALB 4.5 07/09/2025    ALKPHO 80 07/09/2025    BILT 1.0 07/09/2025    AST 27 07/09/2025    ALT 10 07/09/2025    INR 2.00 07/10/2025    PTP 22.9 07/10/2025     Recent Labs   Lab 07/09/25  1958 07/10/25  0627   * 120*   BUN 21 26*   CREATSERUM 1.47* 1.77*   CA 9.7 9.7    140   K 4.6 4.6     99   CO2 34.0* 36.0*     Recent Labs   Lab 07/10/25  0627   RBC 4.71   HGB 12.9   HCT 41.9   MCV 89.0   MCH 27.4   MCHC 30.8*   RDW 15.0   NEPRELIM 17.77*   WBC 20.0*   .0       Recent Labs   Lab 07/09/25 1958   ALT 10   AST 27       Imaging:   CXR:  Impression   CONCLUSION:    Cardiomegaly with mild pulmonary vascular congestion, increased interstitial markings the lungs, patchy groundglass opacity in the lower lungs is concerning for congestive failure with pulmonary edema. There is patchy consolidation in the right lower  lobe with superimposed pneumonia not excluded. Clinical correlation recommended. Trace bilateral pleural effusions.     Impression:   Difficulty with passing SRAVANTHI probe   A fib on Comadin: INR 2.0  CHF  CKD  CXR with CHF and ?PNA    Recommendations:   Pt is declining EGD  Upper GI with gastrograffin to further assess anatomy  Consider Carafate to help with throat pain which is likely from SRAVANTHI probe          Thank you for the consultation, we will follow the patient with you.    Estela Keene DO  9:05 AM  7/10/2025  [facility]  [phone]             [1]   Past Medical History:   Arrhythmia    Atrial fibrillation (HCC)    Congestive heart disease (HCC)    Dysphagia    High blood pressure    High cholesterol    Renal disorder    Unspecified essential hypertension   [2]   Past Surgical History:  Procedure Laterality Date    Hernia surgery      9/2024   [3]    doxycycline (Vibramycin) 100 mg in sodium chloride 0.9% 100mL IVPB-Real  100 mg Intravenous Q12H    cefTRIAXone (Rocephin) 2 g in sodium chloride 0.9% 100mL IVPB-REAL  2 g Intravenous Q24H    [COMPLETED] sodium chloride 0.9% infusion   Intravenous On Call    [COMPLETED] lidocaine PF (Xylocaine-MPF) 1 % injection        [COMPLETED] heparin (Porcine) 5000 UNIT/ML injection        [COMPLETED] benzocaine (Hurricaine/Topex) 20 % mouth spray        [COMPLETED] midazolam (Versed) 2 MG/2ML injection        [COMPLETED] fentaNYL  (Sublimaze) 50 mcg/mL injection        acetaminophen (Tylenol) tab 650 mg  650 mg Oral Q6H PRN    furosemide (Lasix) 10 mg/mL injection 80 mg  80 mg Intravenous BID (Diuretic)    carvedilol (Coreg) tab 6.25 mg  6.25 mg Oral BID with meals    digoxin (Lanoxin) tab 125 mcg  125 mcg Oral Daily    lisinopril (Zestril) tab 10 mg  10 mg Oral Daily    sildenafil (Revatio) tab 20 mg  20 mg Oral TID    warfarin (Coumadin) tab 2 mg  2 mg Oral Nightly    traMADol (Ultram) tab 50 mg  50 mg Oral Q6H PRN    [COMPLETED] bumetanide (Bumex) 0.25 MG/ML injection 2 mg  2 mg Intravenous Once    acetaminophen (Tylenol) tab 650 mg  650 mg Oral Q6H PRN   [4] No Known Allergies

## 2025-07-10 NOTE — CONSULTS
[facility]  Report of Consultation    Lizzy Schwab Patient Status:  Inpatient    1945 MRN [id number]   Location [facility] 2NE-A Attending Harpal Vo MD   Hosp Day # 1 PCP None Pcp     Reason for Consultation:  Acute kidney injury on Chronic kidney disease stage 3b    History of Present Illness:  Lizzy Schwab is a 79 year old female with history of severe MR/TR, HTN, heart failure who presented for RHC/SRAVANTHI. Nephrology consulted for acute kidney injury on CKD3b.     She presented yesterday for RHC and SRAVANTHI. RHC showed elevated biventricular filling pressures. She has history of severe TR and MR and SRAVANTHI was planned as well - however, they were unable to pass probe into esophagus. Admitted for obs. CXR with mild pulmonary vascular congestion. She has received IV diuresis per cardiology - plan for possible transition to PO diuretics this afternoon. She reports that her breathing is much better this afternoon  continues to have pain in her throat.     Baseline serum creatinine has been around 1.3-1.5 mg/dL since around .    History:  Past Medical History[1]  Past Surgical History[2]  Family History[3]  Denies family history of kidney disease.    reports that she quit smoking about 31 years ago. Her smoking use included cigarettes. She started smoking about 46 years ago. She has a 1.5 pack-year smoking history. She has never used smokeless tobacco. She reports current alcohol use. She reports that she does not use drugs.    Allergies:  Allergies[4]    Medications:  Current Hospital Medications[5]  Prior to Admission Medications[6]    Review of Systems:  Please see HPI for pertinent positives. 10 point review of systems otherwise reviewed and negative.     Physical Exam:  /55 (BP Location: Left arm)   Pulse 88   Temp 98.2 °F (36.8 °C) (Oral)   Resp (!) 29   Ht 5' 8\" (1.727 m)   Wt 188 lb 4.4 oz (85.4 kg)   LMP  (LMP Unknown)   SpO2 (!) 86%   BMI 28.63 kg/m²   Temp (24hrs), Av.9  °F (36.6 °C), Min:97.3 °F (36.3 °C), Max:98.4 °F (36.9 °C)       Intake/Output Summary (Last 24 hours) at 7/10/2025 1520  Last data filed at 7/10/2025 0958  Gross per 24 hour   Intake 470 ml   Output 300 ml   Net 170 ml     Wt Readings from Last 3 Encounters:   07/10/25 188 lb 4.4 oz (85.4 kg)   06/28/23 185 lb (83.9 kg)   08/02/21 181 lb (82.1 kg)     General: awake, alert  HEENT: No scleral icterus, MMM  Neck: Supple, no LELO or thyromegaly  Cardiac: Regular rate and rhythm  Lungs: Decreased breath sounds at the bases bilaterally. +ARCHIE  Abdomen: Soft, non-tender.   Extremities: Without clubbing, cyanosis; trace LE edema  Neurologic: Cranial nerves grossly intact, moving all extremities  Skin: Warm and dry, no rashes      Laboratory Data:    Recent Labs   Lab 07/09/25  1958 07/10/25  0627   WBC 14.8* 20.0*   HGB 12.7 12.9   MCV 86.9 89.0   .0 380.0   INR  --  2.00*       Recent Labs   Lab 07/09/25  1958 07/10/25  0627    140   K 4.6 4.6    99   CO2 34.0* 36.0*   BUN 21 26*   CREATSERUM 1.47* 1.77*   CA 9.7 9.7   * 120*       Recent Labs   Lab 07/09/25 1958   ALT 10   AST 27   ALB 4.5       No results for input(s): \"PGLU\" in the last 168 hours.    Imaging:  All imaging studies reviewed.    Assessment / Plan:    1) Acute kidney injury on CKD3b: Baseline serum creatinine 1.3-1.5 mg/dL, likely in setting of long-standing HTN and heart failure. Current acute kidney injury is related to diuresis and not unexpected, may need to allow for some degree of azotemia in order to achieve euvolemia. Likely transition to PO diuretics tomorrow AM. Follow renal function daily.     2) Acute on chronic HFpEF: due to valvular heart disease. On IV lasix currently per cardiology. On GDMT per cardiology. Can likely transition to PO diuretics in AM.     3) Dysphagia: GI following, plan for Upper GI with gastrografin to assess anatomy. Pain in throat thought to be 2/2 SRAVANTHI probe.    4) Afib: on coumadin    Thank you  for allowing me to participate in this patient's care. Please feel free to call me with any questions or concerns.     Georgette Chaves MD  07/10/25       [1]   Past Medical History:   Arrhythmia    Atrial fibrillation (HCC)    Congestive heart disease (HCC)    Dysphagia    High blood pressure    High cholesterol    Renal disorder    Unspecified essential hypertension   [2]   Past Surgical History:  Procedure Laterality Date    Hernia surgery      9/2024   [3]   Family History  Problem Relation Age of Onset    Heart Disorder Father     Cancer Sister         leukemia    Cancer Brother         prostate cancer   [4] No Known Allergies  [5]   Current Facility-Administered Medications:     doxycycline (Vibramycin) 100 mg in sodium chloride 0.9% 100mL IVPB-Real, 100 mg, Intravenous, Q12H    cefTRIAXone (Rocephin) 2 g in sodium chloride 0.9% 100mL IVPB-REAL, 2 g, Intravenous, Q24H    furosemide (Lasix) 10 mg/mL injection 80 mg, 80 mg, Intravenous, BID (Diuretic)    carvedilol (Coreg) tab 6.25 mg, 6.25 mg, Oral, BID with meals    digoxin (Lanoxin) tab 125 mcg, 125 mcg, Oral, Daily    lisinopril (Zestril) tab 10 mg, 10 mg, Oral, Daily    sildenafil (Revatio) tab 20 mg, 20 mg, Oral, TID    warfarin (Coumadin) tab 2 mg, 2 mg, Oral, Nightly    traMADol (Ultram) tab 50 mg, 50 mg, Oral, Q6H PRN    acetaminophen (Tylenol) tab 650 mg, 650 mg, Oral, Q6H PRN    Facility-Administered Medications Ordered in Other Encounters:     acetaminophen (Tylenol) tab 650 mg, 650 mg, Oral, Q6H PRN  [6]   No current outpatient medications on file.

## 2025-07-10 NOTE — PLAN OF CARE
Pt s/p RHC for eval of VHD. Received pt at 1930 during handoff.    A&Ox 4. Voice hoarse. Strength: WNL, some generalized weakness. 3L NC needed during the daytime, wore CPAP overnight, lungs sounds diminished w/ bilateral crackles. Afib w/ occasional PVCs on tele, normotensive. Mild nonpitting BLE edema. Moderate neck incisional & throat pain. Tylenol and tramadol given PRN as pain medication. Hot tea given to soothe throat.     GI/: Purewick in place for accurate I&Os.   Activity: SBA, x1 walker w/ weakness  LDA: PIV.   Skin: CDI.   Incision: RIJ neck incision CDI, soft, no hematoma. Some mild swelling noted. Old pinpoint drainage.     2022 Paged hospitalist for CPAP orders. Pt w/ PMH of ALEXYS and wears CPAP at night, relayed recent unsuccessful SRAVANTHI w/ no bleeding noted but mild dysphagia and pain. Inquired if okay to resume CPAP or holdoff. Requested tylenol PRN order. Per scarlett Gonzalez for CPAP. Tylenol PRN ordered and given.     2024 Paged cardiology to verify if okay to give scheduled warfarin. Relayed recent unsuccessful SRAVANTHI d/t inability to advance probe into esophagus. Small amount of bloody sputum suctioned from mouth and oral airway after. Some dysphagia, neck/throat pain, and hoarse voice but no obvious signs of bleeding in mouth or RIJ incision site. Relayed no recent INR since 2021. VSS. Per ROSA Baker give warfarin as ordered unless obvious signs of bleeding such as coughing/spitting up blood. Warfarin given as ordered.     0018 Paged hospitalist to notify of cxr results: mild pulm vasc congestion, poss pulmonary edema, & patchy consolidation in RLL w/ superimposed PNA not excluded. Clinical correlation rec'd. Trace bilateral pleural effusions. Relayed pt afebrile but WBC slightly elevated. Pt not on any abx. Confirming w/ MD of cxr results and if in agreement w/ PNA & would like to start abx. Pt RA at baseline but needed 3L NC during the day & CPAP at night for ALEXYS. Satting 88-low 90s w/ CPAP.   Message read by Dr. Toussaint w/o response. No further orders at this time.    All medications given as ordered. Pt resting in bed w/ all safety measures in place and call light within reach. Patient updated on plan of care, all questions answered.    Plan is to continue w/ IV diuresis.     Problem: PAIN - ADULT  Goal: Verbalizes/displays adequate comfort level or patient's stated pain goal  Description: INTERVENTIONS:  - Encourage pt to monitor pain and request assistance  - Assess pain using appropriate pain scale  - Administer analgesics based on type and severity of pain and evaluate response  - Implement non-pharmacological measures as appropriate and evaluate response  - Consider cultural and social influences on pain and pain management  - Manage/alleviate anxiety  - Utilize distraction and/or relaxation techniques  - Monitor for opioid side effects  - Notify MD/LIP if interventions unsuccessful or patient reports new pain  - Anticipate increased pain with activity and pre-medicate as appropriate  Outcome: Progressing     Problem: CARDIOVASCULAR - ADULT  Goal: Maintains optimal cardiac output and hemodynamic stability  Description: INTERVENTIONS:  - Monitor vital signs, rhythm, and trends  - Monitor for bleeding, hypotension and signs of decreased cardiac output  - Evaluate effectiveness of vasoactive medications to optimize hemodynamic stability  - Monitor arterial and/or venous puncture sites for bleeding and/or hematoma  - Assess quality of pulses, skin color and temperature  - Assess for signs of decreased coronary artery perfusion - ex. Angina  - Evaluate fluid balance, assess for edema, trend weights  Outcome: Progressing  Goal: Absence of cardiac arrhythmias or at baseline  Description: INTERVENTIONS:  - Continuous cardiac monitoring, monitor vital signs, obtain 12 lead EKG if indicated  - Evaluate effectiveness of antiarrhythmic and heart rate control medications as ordered  - Initiate emergency  measures for life threatening arrhythmias  - Monitor electrolytes and administer replacement therapy as ordered  Outcome: Progressing     Problem: RESPIRATORY - ADULT  Goal: Achieves optimal ventilation and oxygenation  Description: INTERVENTIONS:  - Assess for changes in respiratory status  - Assess for changes in mentation and behavior  - Position to facilitate oxygenation and minimize respiratory effort  - Oxygen supplementation based on oxygen saturation or ABGs  - Provide Smoking Cessation handout, if applicable  - Encourage broncho-pulmonary hygiene including cough, deep breathe, Incentive Spirometry  - Assess the need for suctioning and perform as needed  - Assess and instruct to report SOB or any respiratory difficulty  - Respiratory Therapy support as indicated  - Manage/alleviate anxiety  - Monitor for signs/symptoms of CO2 retention  Outcome: Progressing     Problem: GENITOURINARY - ADULT  Goal: Absence of urinary retention  Description: INTERVENTIONS:  - Assess patient’s ability to void and empty bladder  - Monitor intake/output and perform bladder scan as needed  - Follow urinary retention protocol/standard of care  - Consider collaborating with pharmacy to review patient's medication profile  - Implement strategies to promote bladder emptying  Outcome: Progressing     Problem: METABOLIC/FLUID AND ELECTROLYTES - ADULT  Goal: Electrolytes maintained within normal limits  Description: INTERVENTIONS:  - Monitor labs and rhythm and assess patient for signs and symptoms of electrolyte imbalances  - Administer electrolyte replacement as ordered  - Monitor response to electrolyte replacements, including rhythm and repeat lab results as appropriate  - Fluid restriction as ordered  - Instruct patient on fluid and nutrition restrictions as appropriate  Outcome: Progressing  Goal: Hemodynamic stability and optimal renal function maintained  Description: INTERVENTIONS:  - Monitor labs and assess for signs and  symptoms of volume excess or deficit  - Monitor intake, output and patient weight  - Monitor urine specific gravity, serum osmolarity and serum sodium as indicated or ordered  - Monitor response to interventions for patient's volume status, including labs, urine output, blood pressure (other measures as available)  - Encourage oral intake as appropriate  - Instruct patient on fluid and nutrition restrictions as appropriate  Outcome: Progressing

## 2025-07-10 NOTE — PLAN OF CARE
Pt alert and oriented x 4.  Continuous tele monitoring in place.  Afib with PVCs on the monitor.  Reports right neck pain.  Incision dressing C/D/I but swelling noted to area.  States pain is worse when she takes a deep breath or is more active.   Room air.  Steady gait with SBA.  Sitting in chair at present.  States she breathes more easily when sitting up has her neck pain is worse when laying down.  Passed dysphagia screen.  Able to take medication whole or crushed with pudding depending on the size of the tablet.  PT/OT to see.  Purewick in place. WBC remains elevated.  ProBNP and procal elevated.  Extended respiratory panel sent and is negative. Urine sent for Legionella.  Blood cultures pending.  Strep culture pending.  Pulmonology and renal consults added.   Started on iv abx x2. Sepsis alert fired.  Hospitalist aware.  No new orders.  XR esophagus added per GI.  Result showed posterior and leftward displacement of mid and distal esophagus secondary to enlarged left atrium.  Diminished primary esophageal peristalsis.   No esophageal stricture.   GI made aware of results.  Per GI, pt may have CLD for now and advance as tolerated.  No EGD planned.  GI signed off.  Safety and comfort maintained.  Will continue to monitor.      Problem: PAIN - ADULT  Goal: Verbalizes/displays adequate comfort level or patient's stated pain goal  Description: INTERVENTIONS:  - Encourage pt to monitor pain and request assistance  - Assess pain using appropriate pain scale  - Administer analgesics based on type and severity of pain and evaluate response  - Implement non-pharmacological measures as appropriate and evaluate response  - Consider cultural and social influences on pain and pain management  - Manage/alleviate anxiety  - Utilize distraction and/or relaxation techniques  - Monitor for opioid side effects  - Notify MD/LIP if interventions unsuccessful or patient reports new pain  - Anticipate increased pain with activity and  pre-medicate as appropriate  Outcome: Progressing     Problem: CARDIOVASCULAR - ADULT  Goal: Maintains optimal cardiac output and hemodynamic stability  Description: INTERVENTIONS:  - Monitor vital signs, rhythm, and trends  - Monitor for bleeding, hypotension and signs of decreased cardiac output  - Evaluate effectiveness of vasoactive medications to optimize hemodynamic stability  - Monitor arterial and/or venous puncture sites for bleeding and/or hematoma  - Assess quality of pulses, skin color and temperature  - Assess for signs of decreased coronary artery perfusion - ex. Angina  - Evaluate fluid balance, assess for edema, trend weights  Outcome: Progressing  Goal: Absence of cardiac arrhythmias or at baseline  Description: INTERVENTIONS:  - Continuous cardiac monitoring, monitor vital signs, obtain 12 lead EKG if indicated  - Evaluate effectiveness of antiarrhythmic and heart rate control medications as ordered  - Initiate emergency measures for life threatening arrhythmias  - Monitor electrolytes and administer replacement therapy as ordered  Outcome: Progressing     Problem: RESPIRATORY - ADULT  Goal: Achieves optimal ventilation and oxygenation  Description: INTERVENTIONS:  - Assess for changes in respiratory status  - Assess for changes in mentation and behavior  - Position to facilitate oxygenation and minimize respiratory effort  - Oxygen supplementation based on oxygen saturation or ABGs  - Provide Smoking Cessation handout, if applicable  - Encourage broncho-pulmonary hygiene including cough, deep breathe, Incentive Spirometry  - Assess the need for suctioning and perform as needed  - Assess and instruct to report SOB or any respiratory difficulty  - Respiratory Therapy support as indicated  - Manage/alleviate anxiety  - Monitor for signs/symptoms of CO2 retention  Outcome: Progressing     Problem: GENITOURINARY - ADULT  Goal: Absence of urinary retention  Description: INTERVENTIONS:  - Assess  patient’s ability to void and empty bladder  - Monitor intake/output and perform bladder scan as needed  - Follow urinary retention protocol/standard of care  - Consider collaborating with pharmacy to review patient's medication profile  - Implement strategies to promote bladder emptying  Outcome: Progressing     Problem: METABOLIC/FLUID AND ELECTROLYTES - ADULT  Goal: Electrolytes maintained within normal limits  Description: INTERVENTIONS:  - Monitor labs and rhythm and assess patient for signs and symptoms of electrolyte imbalances  - Administer electrolyte replacement as ordered  - Monitor response to electrolyte replacements, including rhythm and repeat lab results as appropriate  - Fluid restriction as ordered  - Instruct patient on fluid and nutrition restrictions as appropriate  Outcome: Progressing  Goal: Hemodynamic stability and optimal renal function maintained  Description: INTERVENTIONS:  - Monitor labs and assess for signs and symptoms of volume excess or deficit  - Monitor intake, output and patient weight  - Monitor urine specific gravity, serum osmolarity and serum sodium as indicated or ordered  - Monitor response to interventions for patient's volume status, including labs, urine output, blood pressure (other measures as available)  - Encourage oral intake as appropriate  - Instruct patient on fluid and nutrition restrictions as appropriate  Outcome: Progressing

## 2025-07-10 NOTE — PROGRESS NOTES
[facility]   part of [facility]    Advanced Heart Failure Progress Note    Lizzy Schwab Patient Status:  Inpatient    1945 MRN [id number]   Location [facility] 2NE-A Attending Harpal Vo MD   Hosp Day # 1 PCP None Pcp     Subjective:  Feels well. Denies CP, SOB, syncope. No overt LE edema. Dysphagia and weakness improving. Awaiting GI evaluation. Tolerating oral diuretics otherwise.       Objective:  /61 (BP Location: Left arm)   Pulse 65   Temp 97.8 °F (36.6 °C) (Oral)   Resp 20   Ht 5' 8\" (1.727 m)   Wt 188 lb 4.4 oz (85.4 kg)   LMP  (LMP Unknown)   SpO2 90%   BMI 28.63 kg/m²     Temp (24hrs), Av.9 °F (36.6 °C), Min:97.3 °F (36.3 °C), Max:98.4 °F (36.9 °C)      Intake/Output:    Intake/Output Summary (Last 24 hours) at 7/10/2025 0841  Last data filed at 7/10/2025 0502  Gross per 24 hour   Intake 470 ml   Output 200 ml   Net 270 ml       Wt Readings from Last 3 Encounters:   07/10/25 188 lb 4.4 oz (85.4 kg)   23 185 lb (83.9 kg)   21 181 lb (82.1 kg)       Allergies:  Allergies[1]    Physical Exam:  Blood pressure 109/61, pulse 65, temperature 97.8 °F (36.6 °C), temperature source Oral, resp. rate 20, height 5' 8\" (1.727 m), weight 188 lb 4.4 oz (85.4 kg), SpO2 90%.    General: Alert and oriented in no apparent distress.  HEENT: No focal deficits.  Neck: pulsatile JVD present mid neck, R internal jugular vein site intact, no erythema.   Cardiac: Regular rate and rhythm, S1, S2 normal, no rubs or gallops.  Lungs: Clear without wheezes, rales, rhonchi or dullness.  Normal excursions and effort.  Abdomen: Soft, non-tender.   Extremities: 1+ LE edema.   Neurologic: Alert and oriented, normal affect.  Skin: Warm and dry.     Laboratory/Data:      Labs:  Lab Results   Component Value Date    WBC 20.0 07/10/2025    RBC 4.71 07/10/2025    HGB 12.9 07/10/2025    HCT 41.9 07/10/2025    MCV 89.0 07/10/2025    MCH 27.4 07/10/2025    MCHC 30.8 07/10/2025    RDW 15.0  07/10/2025    .0 07/10/2025     Lab Results   Component Value Date    INR 2.00 (H) 07/10/2025    INR 1.9 (A) 08/03/2021    INR 1.78 (H) 04/28/2021     No results for input(s): \"BNPML\" in the last 168 hours.  BUN (mg/dL)   Date Value   07/10/2025 26 (H)   07/09/2025 21   04/29/2021 41 (H)     Creatinine (mg/dL)   Date Value   07/10/2025 1.77 (H)   07/09/2025 1.47 (H)   04/29/2021 2.30 (H)       Medications:  Current Hospital Medications[2]    Assessment and Plan:  Problem List[3]    Acute on Chronic HFpEF, stage C, NYHA 3  - secondary to VHD progressive with fluid overload signs present objectively   - continue IV diuresis this AM, and then oral diuretics on discharge (torsemide 20 BID), GDMT otherwise, and outpatient structural evaluation     Permanent AF on warfarin   - warfarin daily, INR checks, monitor on tele.     Severe MR / TR   - elevated filling pressures on RHC, with fluid overload, and diuresis well  - continue IV this AM, and then convert to oral diuretics this afternoon.     HTN   - GDMT titration.     CKD 3  - trend labs, diuresis as above.     Dysphagia with weakness  - awaiting GI evaluation. Appreciate primary and GI input    Harpal Vo MD   Advanced Heart Failure and Transplant Cardiology   [facility] ([facility])     L3       [1] No Known Allergies  [2]   Current Facility-Administered Medications   Medication Dose Route Frequency    doxycycline (Vibramycin) 100 mg in sodium chloride 0.9% 100mL IVPB-Darwin  100 mg Intravenous Q12H    cefTRIAXone (Rocephin) 2 g in sodium chloride 0.9% 100mL IVPB-HAYDE  2 g Intravenous Q24H    furosemide (Lasix) 10 mg/mL injection 80 mg  80 mg Intravenous BID (Diuretic)    carvedilol (Coreg) tab 6.25 mg  6.25 mg Oral BID with meals    digoxin (Lanoxin) tab 125 mcg  125 mcg Oral Daily    lisinopril (Zestril) tab 10 mg  10 mg Oral Daily    sildenafil (Revatio) tab 20 mg  20 mg Oral TID    warfarin (Coumadin) tab 2 mg  2 mg Oral Nightly    traMADol  (Ultram) tab 50 mg  50 mg Oral Q6H PRN    acetaminophen (Tylenol) tab 650 mg  650 mg Oral Q6H PRN     Facility-Administered Medications Ordered in Other Encounters   Medication Dose Route Frequency    acetaminophen (Tylenol) tab 650 mg  650 mg Oral Q6H PRN   [3]   Patient Active Problem List  Diagnosis    Atrial fibrillation (HCC)    Hypertension    Obesity    Hyponatremia    Hyperglycemia    Acute on chronic congestive heart failure (HCC)    Coagulopathy (HCC)    Acute on chronic congestive heart failure, unspecified heart failure type (HCC)    Supratherapeutic INR    Pulmonary hypertension (HCC)    YOEL (acute kidney injury)    Stage 3a chronic kidney disease (HCC)    Apnea    Snoring    Iron deficiency anemia secondary to inadequate dietary iron intake    Dyspnea on exertion    Dysphagia    Chronic congestive heart failure (HCC)

## 2025-07-10 NOTE — PROGRESS NOTES
Following a review of the patient’s current clinical condition and management plan, it has been determined that a consult with the Heart Failure Coordinator/ is not appropriate at this time.If the patient's clinical status changes or if there are any concerns regarding heart failure management, please re-consult the Heart Failure /Coordinator.     Fatou Herzog RN  HF    Extension [phone]

## 2025-07-10 NOTE — PROGRESS NOTES
[facility]   part of [facility]     Hospitalist Progress Note     Lizzy Schwab Patient Status:  Inpatient    1945 MRN [id number]   Location [facility] 2NE-A Attending Harpal Vo MD   Hosp Day # 1 PCP None Pcp     Chief Complaint: Medical management    Subjective:     Patient persist to have sore throat and difficulty swallowing.  Persist to have right-sided chest discomfort.  No nausea vomiting.  Afebrile.  GI planning x-ray esophagram today and patient n.p.o. for this.  Workup done including elevated white count and chest x-ray with possible pneumonia right lower lobe and started on IV antibiotics for possible pharyngitis and pneumonia.  At this point patient also states that her grandson had cough and upper respiratory symptoms recently and that patient herself had some cough and has sore throat.    Objective:    Review of Systems:   A comprehensive review of systems was completed; pertinent positive and negatives stated in subjective.    Vital signs:  Temp:  [97.3 °F (36.3 °C)-98.4 °F (36.9 °C)] 97.8 °F (36.6 °C)  Pulse:  [] 75  Resp:  [15-31] 31  BP: (100-153)/() 109/61  SpO2:  [89 %-100 %] 89 %    Physical Exam:    General: No acute distress.   HEENT: Moist mucous membranes.  Tender anterior cervical lymphadenopathy, pharyngeal erythema  Neck: Right-sided neck right heart cath access site in dressing and dressing looks dry.  Respiratory: Clear to auscultation bilaterally.  No wheezes. No rhonchi.  Cardiovascular: S1, S2.  Regular rate and rhythm.    Abdomen: Soft, nontender, nondistended.  Positive bowel sounds.   Neurologic: Awake alert oriented, no focal neurological deficits.  Musculoskeletal: Full range of motion of all extremities.  No pedal edema or calf tenderness  Psychiatric: Appropriate mood and affect.       Diagnostic Data:    Labs:  Recent Labs   Lab 07/09/25  1958 07/10/25  0627   WBC 14.8* 20.0*   HGB 12.7 12.9   MCV 86.9 89.0   .0 380.0   INR  --   2.00*       Recent Labs   Lab 07/09/25  1958 07/10/25  0627   * 120*   BUN 21 26*   CREATSERUM 1.47* 1.77*   CA 9.7 9.7   ALB 4.5  --     140   K 4.6 4.6    99   CO2 34.0* 36.0*   ALKPHO 80  --    AST 27  --    ALT 10  --    BILT 1.0  --    TP 8.0  --        Estimated Glomerular Filtration Rate: 29 mL/min/1.73m2 (A) (result from lab).    No results for input(s): \"TROP\", \"TROPHS\", \"CK\" in the last 168 hours.    Recent Labs   Lab 07/10/25  0627   PTP 22.9*   INR 2.00*                  Microbiology    No results found for this visit on 07/09/25.      Imaging: Reviewed in Epic.  Chest x-ray done on 7/9/2025 night results reviewed as below  XR CHEST AP PORTABLE  (CPT=71045)    INDICATIONS: chf    COMPARISON: 4/13/2021    FINDINGS:  Cardiomegaly with mild pulmonary vascular congestion, increased interstitial markings the lungs, patchy groundglass opacity in the lower lungs is concerning for congestive failure with pulmonary edema. There is patchy consolidation in the right lower  lobe with superimposed pneumonia not excluded. Clinical correlation recommended. Trace bilateral pleural effusions. No pneumothorax. Degenerative change in the spine.    Impression   CONCLUSION:    Cardiomegaly with mild pulmonary vascular congestion, increased interstitial markings the lungs, patchy groundglass opacity in the lower lungs is concerning for congestive failure with pulmonary edema. There is patchy consolidation in the right lower  lobe with superimposed pneumonia not excluded. Clinical correlation recommended. Trace bilateral pleural effusions.      Addendum-x-ray esophagram ordered by GI done on 7/10/2025 resulted as below  XR UGI/ESOPHAGUS DOUBLE CONTRAST (CPT=74246)    INDICATIONS: Throat pain    PATIENT STATED HISTORY: Patient states she had a procedure done yesterday where they inserted a camera down her throat and it failed. Shares she now has neck/throat pain.    COMPARISON: There are no comparisons for  this exam.    TECHNIQUE: Single contrast upper GI and esophagram was performed with water-soluble contrast.    Fluoro/Cine Image Number: [id number]  Fluoro Time: 2 minutes 25 seconds  Radiation Dose: Ugy/m2(m squared)  Technologist Time: 45 minutes    FINDINGS:    No abnormality is seen in the pharynx and cervical esophagus. There is diminished primary peristalsis of the thoracic esophagus at the thoracic esophagus is displaced posteriorly below the tobias, consistent with extrinsic displacement by an enlarged  left atrium. No esophageal stricture is identified. The mucosal outline of the stomach and duodenum appear normal. There is limited distention of the stomach due to small quantity of ingested material. No hiatal hernia or GE reflux was observed  fluoroscopically.      Impression   CONCLUSION:    Posterior and leftward displacement of mid and distal thoracic esophagus secondary to enlarged left atrium. Diminished primary esophageal peristalsis. No esophageal stricture.     Medications:    doxycycline  100 mg Intravenous Q12H    cefTRIAXone  2 g Intravenous Q24H    furosemide  80 mg Intravenous BID (Diuretic)    carvedilol  6.25 mg Oral BID with meals    digoxin  125 mcg Oral Daily    lisinopril  10 mg Oral Daily    sildenafil  20 mg Oral TID    warfarin  2 mg Oral Nightly       Assessment & Plan:    Admitted for observation by cardiology status post cardiac angiogram right heart cath And status post attempted SRAVANTHI-managed by cardiology     #Transient dysphagia and sore throat possibly due to acute pharyngitis with anterior cervical tender lymphadenopathy  #Right lower lobe pneumonia seen on chest x-ray  #Leukocytosis due to above  Started on IV antibiotics  Patient has no crepitus on exam.  No pharyngeal erythema.  Denies any heartburns.  White count increasing today to 20 with elevated neutrophil count also.  Continue IV antibiotics and follow CBC in a.m.  Procalcitonin elevated to 11.21  Blood culture and throat  culture and sputum culture ordered  Respiratory panel flu panel negative  Pulmonary consult  #Hypertension  Follow-up blood pressure  Patient on Coreg at home which we will plan to continue  #Atrial fibrillation  Monitor on telemetry  On chart review patient on Coreg, digoxin, warfarin at home  #Acute on chronic preserved ejection fraction CHF, chronic valvular heart disease with severe mitral regurgitation and tricuspid regurgitation  Cardiology following  Cardiac medications per cardiology.  On chart review patient on Coreg, lisinopril, digoxin  Chest x-ray done on 7/9/2025 with cardiomegaly with mild pulmonary vascular congestion and increased interstitial opacity in lower lungs concerning for CHF with pulmonary edema.  Also with patchy consolidation right lower lobe with superimposed pneumonia not excluded.  Status post IV Bumex per cardiology given on 7/9/2025  IV Lasix 80 mg IV daily started by cardiology on 7/9/2025  Daily weight  I/O chart  CHF protocol  #CKD stage III  this was discussed with patient and patient's daughter at bedside and looked at old labs in Care Everywhere from 2021 and 2024 and reviewed this with patient and daughter  Creatinine increasing with diuresis, will also have nephrology on consult    Discussed with patient, RN    Cammy Singh MD    Supplementary Documentation:     Quality:  DVT Mechanical Prophylaxis:        DVT Pharmacologic Prophylaxis   Medication    warfarin (Coumadin) tab 2 mg                Code Status: Not on file  Bridges: External urinary catheter in place  Bridges Duration (in days):   Central line:    JARED:     Discharge is dependent on: Clinical progress  At this point Ms. Schwab is expected to be discharge to: Home     The 21st Century Cures Act makes medical notes like these available to patients in the interest of transparency. Please be advised this is a medical document. Medical documents are intended to carry relevant information, facts as evident, and the  clinical opinion of the practitioner. The medical note is intended as peer to peer communication and may appear blunt or direct. It is written in medical language and may contain abbreviations or verbiage that are unfamiliar.

## 2025-07-11 ENCOUNTER — APPOINTMENT (OUTPATIENT)
Dept: CV DIAGNOSTICS | Facility: HOSPITAL | Age: 80
End: 2025-07-11
Attending: INTERNAL MEDICINE

## 2025-07-11 ENCOUNTER — APPOINTMENT (OUTPATIENT)
Dept: GENERAL RADIOLOGY | Facility: HOSPITAL | Age: 80
End: 2025-07-11
Attending: INTERNAL MEDICINE

## 2025-07-11 PROBLEM — I07.1 TRICUSPID VALVE INSUFFICIENCY: Status: ACTIVE | Noted: 2025-07-11

## 2025-07-11 PROBLEM — I34.0 MITRAL VALVE INSUFFICIENCY: Status: ACTIVE | Noted: 2025-07-11

## 2025-07-11 LAB
ADENOVIRUS F 40/41 PCR: NEGATIVE
ANION GAP SERPL CALC-SCNC: 8 MMOL/L (ref 0–18)
ASTROVIRUS PCR: NEGATIVE
BASOPHILS # BLD AUTO: 0.02 X10(3) UL (ref 0–0.2)
BASOPHILS NFR BLD AUTO: 0.1 %
BUN BLD-MCNC: 33 MG/DL (ref 9–23)
C CAYETANENSIS DNA SPEC QL NAA+PROBE: NEGATIVE
C DIFF TOX B STL QL: NEGATIVE
CALCIUM BLD-MCNC: 9.7 MG/DL (ref 8.7–10.6)
CAMPY SP DNA.DIARRHEA STL QL NAA+PROBE: NEGATIVE
CHLORIDE SERPL-SCNC: 101 MMOL/L (ref 98–112)
CO2 SERPL-SCNC: 31 MMOL/L (ref 21–32)
CREAT BLD-MCNC: 2.45 MG/DL (ref 0.55–1.02)
CRYPTOSP DNA SPEC QL NAA+PROBE: NEGATIVE
EAEC PAA PLAS AGGR+AATA ST NAA+NON-PRB: NEGATIVE
EC STX1+STX2 + H7 FLIC SPEC NAA+PROBE: NEGATIVE
EGFRCR SERPLBLD CKD-EPI 2021: 19 ML/MIN/1.73M2 (ref 60–?)
ENTAMOEBA HISTOLYTICA PCR: NEGATIVE
EOSINOPHIL # BLD AUTO: 0 X10(3) UL (ref 0–0.7)
EOSINOPHIL NFR BLD AUTO: 0 %
EPEC EAE GENE STL QL NAA+NON-PROBE: NEGATIVE
ERYTHROCYTE [DISTWIDTH] IN BLOOD BY AUTOMATED COUNT: 15.2 %
ETEC LTA+ST1A+ST1B TOX ST NAA+NON-PROBE: NEGATIVE
GIARDIA LAMBLIA PCR: NEGATIVE
GLUCOSE BLD-MCNC: 117 MG/DL (ref 70–99)
HCT VFR BLD AUTO: 40.6 % (ref 35–48)
HGB BLD-MCNC: 12.6 G/DL (ref 12–16)
IMM GRANULOCYTES # BLD AUTO: 0.32 X10(3) UL (ref 0–1)
IMM GRANULOCYTES NFR BLD: 1.6 %
INR BLD: 3.09 (ref 0.8–1.2)
LYMPHOCYTES # BLD AUTO: 0.56 X10(3) UL (ref 1–4)
LYMPHOCYTES NFR BLD AUTO: 2.8 %
MCH RBC QN AUTO: 27.1 PG (ref 26–34)
MCHC RBC AUTO-ENTMCNC: 31 G/DL (ref 31–37)
MCV RBC AUTO: 87.3 FL (ref 80–100)
MONOCYTES # BLD AUTO: 0.93 X10(3) UL (ref 0.1–1)
MONOCYTES NFR BLD AUTO: 4.7 %
NEUTROPHILS # BLD AUTO: 17.98 X10 (3) UL (ref 1.5–7.7)
NEUTROPHILS # BLD AUTO: 17.98 X10(3) UL (ref 1.5–7.7)
NEUTROPHILS NFR BLD AUTO: 90.8 %
NOROVIRUS GI/GII PCR: NEGATIVE
OSMOLALITY SERPL CALC.SUM OF ELEC: 298 MOSM/KG (ref 275–295)
P SHIGELLOIDES DNA STL QL NAA+PROBE: NEGATIVE
PLATELET # BLD AUTO: 360 10(3)UL (ref 150–450)
POTASSIUM SERPL-SCNC: 4.5 MMOL/L (ref 3.5–5.1)
PROTHROMBIN TIME: 32.1 SECONDS (ref 11.6–14.8)
RBC # BLD AUTO: 4.65 X10(6)UL (ref 3.8–5.3)
ROTAVIRUS A PCR: NEGATIVE
SALMONELLA DNA SPEC QL NAA+PROBE: NEGATIVE
SAPOVIRUS PCR: NEGATIVE
SHIGELLA SP+EIEC IPAH ST NAA+NON-PROBE: NEGATIVE
SODIUM SERPL-SCNC: 140 MMOL/L (ref 136–145)
V CHOLERAE DNA SPEC QL NAA+PROBE: NEGATIVE
VIBRIO DNA SPEC NAA+PROBE: NEGATIVE
WBC # BLD AUTO: 19.8 X10(3) UL (ref 4–11)
YERSINIA DNA SPEC NAA+PROBE: NEGATIVE

## 2025-07-11 PROCEDURE — 99233 SBSQ HOSP IP/OBS HIGH 50: CPT | Performed by: INTERNAL MEDICINE

## 2025-07-11 PROCEDURE — 71046 X-RAY EXAM CHEST 2 VIEWS: CPT | Performed by: INTERNAL MEDICINE

## 2025-07-11 RX ORDER — SILDENAFIL CITRATE 20 MG/1
10 TABLET ORAL 3 TIMES DAILY
Status: DISCONTINUED | OUTPATIENT
Start: 2025-07-11 | End: 2025-07-15

## 2025-07-11 RX ORDER — TORSEMIDE 20 MG/1
20 TABLET ORAL
Status: DISCONTINUED | OUTPATIENT
Start: 2025-07-11 | End: 2025-07-15

## 2025-07-11 RX ORDER — SUCRALFATE ORAL 1 G/10ML
1 SUSPENSION ORAL
Status: DISCONTINUED | OUTPATIENT
Start: 2025-07-11 | End: 2025-07-15

## 2025-07-11 RX ORDER — SUCRALFATE ORAL 1 G/10ML
1 SUSPENSION ORAL
Qty: 280 ML | Refills: 0 | Status: SHIPPED | OUTPATIENT
Start: 2025-07-11 | End: 2025-07-15

## 2025-07-11 RX ORDER — LACTOBACILLUS ACIDOPHILUS 500MM CELL
1 CAPSULE ORAL DAILY
Status: DISCONTINUED | OUTPATIENT
Start: 2025-07-11 | End: 2025-07-14

## 2025-07-11 RX ORDER — CARVEDILOL 12.5 MG/1
12.5 TABLET ORAL 2 TIMES DAILY WITH MEALS
Status: DISCONTINUED | OUTPATIENT
Start: 2025-07-11 | End: 2025-07-15

## 2025-07-11 RX ADMIN — DIGOXIN 125 MCG: 125 MCG TABLET ORAL at 09:30:00

## 2025-07-11 RX ADMIN — CARVEDILOL 6.25 MG: 3.12 TABLET ORAL at 09:21:00

## 2025-07-11 RX ADMIN — ACETAMINOPHEN 650 MG: 325 TABLET ORAL at 22:00:00

## 2025-07-11 RX ADMIN — LISINOPRIL 10 MG: 10 TABLET ORAL at 09:21:00

## 2025-07-11 RX ADMIN — SILDENAFIL CITRATE 20 MG: 20 TABLET ORAL at 09:21:00

## 2025-07-11 RX ADMIN — LACTOBACILLUS ACIDOPHILUS 1 EACH: 500MM CELL CAPSULE ORAL at 12:15:00

## 2025-07-11 RX ADMIN — SILDENAFIL CITRATE 10 MG: 20 TABLET ORAL at 22:00:00

## 2025-07-11 RX ADMIN — TORSEMIDE 20 MG: 20 TABLET ORAL at 09:21:00

## 2025-07-11 RX ADMIN — SILDENAFIL CITRATE 10 MG: 20 TABLET ORAL at 16:56:00

## 2025-07-11 RX ADMIN — TORSEMIDE 20 MG: 20 TABLET ORAL at 16:56:00

## 2025-07-11 RX ADMIN — SUCRALFATE ORAL 1 G: 1 SUSPENSION ORAL at 20:48:00

## 2025-07-11 NOTE — PLAN OF CARE
Pt alert and oriented x 4. Daughter at bedside earlier AM.  Continuous tele monitoring in place.  Afib with pvcs on the monitor.  HR 80s to low 100s.  Brief HR increase to 140s x1.  Report pain to right neck improved since yesterday.  Right neck area still appears swollen.  Dressing to right neck incision C/D/I.  Denies dizziness.  O2 2L per NC as needed. Reporting frequent loose bm this shift. GI and hospitalist made aware.  New order received for probiotic.  GI stool panel to be collected. Repeat chest xr completed.  Results pending.  Safety and comfort maintained.  Will continue to monitor.      Problem: PAIN - ADULT  Goal: Verbalizes/displays adequate comfort level or patient's stated pain goal  Description: INTERVENTIONS:  - Encourage pt to monitor pain and request assistance  - Assess pain using appropriate pain scale  - Administer analgesics based on type and severity of pain and evaluate response  - Implement non-pharmacological measures as appropriate and evaluate response  - Consider cultural and social influences on pain and pain management  - Manage/alleviate anxiety  - Utilize distraction and/or relaxation techniques  - Monitor for opioid side effects  - Notify MD/LIP if interventions unsuccessful or patient reports new pain  - Anticipate increased pain with activity and pre-medicate as appropriate  Outcome: Progressing     Problem: CARDIOVASCULAR - ADULT  Goal: Maintains optimal cardiac output and hemodynamic stability  Description: INTERVENTIONS:  - Monitor vital signs, rhythm, and trends  - Monitor for bleeding, hypotension and signs of decreased cardiac output  - Evaluate effectiveness of vasoactive medications to optimize hemodynamic stability  - Monitor arterial and/or venous puncture sites for bleeding and/or hematoma  - Assess quality of pulses, skin color and temperature  - Assess for signs of decreased coronary artery perfusion - ex. Angina  - Evaluate fluid balance, assess for edema, trend  weights  Outcome: Progressing  Goal: Absence of cardiac arrhythmias or at baseline  Description: INTERVENTIONS:  - Continuous cardiac monitoring, monitor vital signs, obtain 12 lead EKG if indicated  - Evaluate effectiveness of antiarrhythmic and heart rate control medications as ordered  - Initiate emergency measures for life threatening arrhythmias  - Monitor electrolytes and administer replacement therapy as ordered  Outcome: Progressing     Problem: RESPIRATORY - ADULT  Goal: Achieves optimal ventilation and oxygenation  Description: INTERVENTIONS:  - Assess for changes in respiratory status  - Assess for changes in mentation and behavior  - Position to facilitate oxygenation and minimize respiratory effort  - Oxygen supplementation based on oxygen saturation or ABGs  - Provide Smoking Cessation handout, if applicable  - Encourage broncho-pulmonary hygiene including cough, deep breathe, Incentive Spirometry  - Assess the need for suctioning and perform as needed  - Assess and instruct to report SOB or any respiratory difficulty  - Respiratory Therapy support as indicated  - Manage/alleviate anxiety  - Monitor for signs/symptoms of CO2 retention  Outcome: Progressing     Problem: GENITOURINARY - ADULT  Goal: Absence of urinary retention  Description: INTERVENTIONS:  - Assess patient’s ability to void and empty bladder  - Monitor intake/output and perform bladder scan as needed  - Follow urinary retention protocol/standard of care  - Consider collaborating with pharmacy to review patient's medication profile  - Implement strategies to promote bladder emptying  Outcome: Progressing     Problem: METABOLIC/FLUID AND ELECTROLYTES - ADULT  Goal: Electrolytes maintained within normal limits  Description: INTERVENTIONS:  - Monitor labs and rhythm and assess patient for signs and symptoms of electrolyte imbalances  - Administer electrolyte replacement as ordered  - Monitor response to electrolyte replacements, including  rhythm and repeat lab results as appropriate  - Fluid restriction as ordered  - Instruct patient on fluid and nutrition restrictions as appropriate  Outcome: Progressing  Goal: Hemodynamic stability and optimal renal function maintained  Description: INTERVENTIONS:  - Monitor labs and assess for signs and symptoms of volume excess or deficit  - Monitor intake, output and patient weight  - Monitor urine specific gravity, serum osmolarity and serum sodium as indicated or ordered  - Monitor response to interventions for patient's volume status, including labs, urine output, blood pressure (other measures as available)  - Encourage oral intake as appropriate  - Instruct patient on fluid and nutrition restrictions as appropriate  Outcome: Progressing

## 2025-07-11 NOTE — PHYSICAL THERAPY NOTE
PHYSICAL THERAPY EVALUATION - INPATIENT     Room Number: 2612/2612-A  Evaluation Date: 7/11/2025  Type of Evaluation: Initial  Physician Order: PT Eval and Treat    Presenting Problem: RHC, SRAVANTHI 7/9, acute pharyngitis, anterior cervical lymphadenopathy  Co-Morbidities : CKD, HTN, a fib  Reason for Therapy: Mobility Dysfunction and Discharge Planning    PHYSICAL THERAPY ASSESSMENT   Patient is a 79 year old female admitted 7/9/2025 for RHC and SRAVANTHI, now being worked up for acute pharyngitis.  Prior to admission, patient's baseline is mod I w/ adl's, gait w/o device and drives.  Patient is currently functioning near baseline with bed mobility, transfers, and gait.  Patient is requiring supervision as a result of the following impairments: decreased functional strength, decreased endurance/aerobic capacity, decreased muscular endurance, and medical status.  Physical Therapy will continue to follow for duration of hospitalization.    Patient will benefit from continued skilled PT Services for duration of hospitalization, however, given the patient is functioning near baseline level do not anticipate skilled therapy needs at discharge .    PLAN DURING HOSPITALIZATION  Nursing Mobility Recommendation : 1 Assist  PT Device Recommendation: None  PT Treatment Plan: Bed mobility, Patient education, Gait training, Strengthening, Transfer training, Balance training, Endurance, Energy conservation  Rehab Potential : Good  Frequency (Obs): 3-5x/week     CURRENT GOALS    Goal #1 Patient is able to demonstrate supine - sit EOB @ level: modified independent     Goal #2 Patient is able to demonstrate transfers EOB to/from BSC at assistance level: modified independent - met 7/11     Goal #3 Patient is able to ambulate 250 feet with assist device: none at assistance level: modified independent     Goal #4    Goal #5    Goal #6    Goal Comments: Goals established on 7/11/2025      PHYSICAL THERAPY MEDICAL/SOCIAL HISTORY  History related  to current admission: Patient is a 79 year old female admitted on 7/9/2025 from home for planned RHC and SRAVANTHI.  Pt diagnosed with acute pharyngitis and anterior cervical lymphadenopathy.  Infectious work-up is ongoing.  7/10 xray UGI/esophagus - per chart  CONCLUSION:     Posterior and leftward displacement of mid and distal thoracic esophagus secondary to enlarged left atrium. Diminished primary esophageal peristalsis. No esophageal stricture.    HOME SITUATION  Type of Home: Independent living facility  Home Layout: One level  Stairs to Enter : 0        Stairs to Bedroom: 0         Lives With: Alone (Dtr lives nearby)    Drives: Yes          Prior Level of [place]: Pt is typically independent w/ adl's, gait w/o device, drives and does her own cooking/cleaning/shopping.  Has supportive dtr in the area.      SUBJECTIVE  \"My throat hurts so much, but I needed to cough that up.\"  Pt w/ prolonged coughing episode upon entering room, pt was able to get sputum up.    OBJECTIVE     Fall Risk: High fall risk    WEIGHT BEARING RESTRICTION     PAIN ASSESSMENT  Rating: Unable to rate  Location: Severe sore throat pain  Management Techniques: Other (Comment) (Pt was able to cough and clear some phlegm during session)    COGNITION  Overall Cognitive Status:  WFL - within functional limits    RANGE OF MOTION AND STRENGTH ASSESSMENT  Upper extremity ROM and strength are within functional limits     Lower extremity ROM is within functional limits     Lower extremity strength is within functional limits     BALANCE  Static Sitting: Good  Dynamic Sitting: Good  Static Standing: Fair -  Dynamic Standing: Fair -    ADDITIONAL TESTS                                    ACTIVITY TOLERANCE  Pulse: 117  Heart Rate Source: Monitor  Resp: 26                O2 WALK  Oxygen Therapy  SPO2% on Room Air at Rest: 93  SPO2% on Oxygen at Rest: 95  At rest oxygen flow (liters per minute): 1.5    NEUROLOGICAL FINDINGS                        AM-PAC  '6-Clicks' INPATIENT SHORT FORM - BASIC MOBILITY  How much difficulty does the patient currently have...  Patient Difficulty: Turning over in bed (including adjusting bedclothes, sheets and blankets)?: None   Patient Difficulty: Sitting down on and standing up from a chair with arms (e.g., wheelchair, bedside commode, etc.): None   Patient Difficulty: Moving from lying on back to sitting on the side of the bed?: None   How much help from another person does the patient currently need...   Help from Another: Moving to and from a bed to a chair (including a wheelchair)?: A Little   Help from Another: Need to walk in hospital room?: A Little   Help from Another: Climbing 3-5 steps with a railing?: A Little     AM-PAC Score:  Raw Score: 21   Approx Degree of Impairment: 28.97%   Standardized Score (AM-PAC Scale): 50.25   CMS Modifier (G-Code): CJ    FUNCTIONAL ABILITY STATUS  Gait Assessment   Functional Mobility/Gait Assessment  Gait Assistance: Supervision  Distance (ft): 125  Assistive Device: None  Pattern: Within Functional Limits (One lob during gait that she was able to regain independently)    Skilled Therapy Provided     Bed Mobility:  Rolling: NT  Supine to sit: NT   Sit to supine: NT   Pt on 1.5L of O2 upon entering room.  Removed o2 and left pt on room air during her coughing/blowing nose.  Pt maintained sat levels ~ 93%  Transfer Mobility:  Sit to stand: Mod I, using ue's to support transition.   Stand to sit: Mod I  Gait = Pt completed gait 125'x1 w/o device and supervision assist on room air.  Pt w/ one more significant lob that pt was able to regain independently. Pt reports that she doesn't need a walker, functions w/o it at home.     Therapist's Comments: Tried to obtain O2 sat upon returning to room w/ gait on room air, but oximeter was no longer working.  Reapplied 1.5L at end of gait secondary to oximeter not working - informed PCT.  Encouraged pt to walk 2-3x a day w/ staff to maintain her strength  in preparation for home.    Exercise/Education Provided:  Functional activity tolerated  Gait training  Transfer training    Patient End of Session: Up in chair, Needs met, Call light within reach, RN aware of session/findings, All patient questions and concerns addressed, Hospital anti-slip socks (Rn Shakira aware of eval session)      Patient Evaluation Complexity Level:  History Low - no personal factors and/or co-morbidities   Examination of body systems Moderate - addressing a total of 3 or more elements   Clinical Presentation  Moderate - Evolving   Clinical Decision Making Low Complexity       PT Session Time: 25 minutes  Gait Trainin minutes  Therapeutic Activity: 6 minutes  Neuromuscular Re-education: 0 minutes  Therapeutic Exercise: 0 minutes

## 2025-07-11 NOTE — PROGRESS NOTES
Dr Singh pagekristopher regarding sepsis alert firing again.  Orders placed by md.  Lactic acid acid 1.3.  WBC improved to 18.5.  Pt on room air.  No fever.  Per MD no need for fluid bolus.

## 2025-07-11 NOTE — PROGRESS NOTES
Gastroenterology Progress Note  Patient Name: Lizzy Schwab  Chief Complaint: failed SRAVANTHI, sore throat, diarrhea  S: Pt had some diarrhea yesterday but notes more formed stool today. She has mild sore throat still.   O: BP 90/45   Pulse 83   Temp 98.6 °F (37 °C) (Oral)   Resp (!) 37   Ht 5' 8\" (1.727 m)   Wt 188 lb 11.4 oz (85.6 kg)   LMP  (LMP Unknown)   SpO2 92%   BMI 28.69 kg/m²   Gen: AAOx3  CV: RRR with normal S1 / S2  Resp: No increased respiratory effort  Abd: (+)BS, soft, non-tender, non-distended; no rebound or guarding  Ext: No edema or cyanosis  Skin: Warm and dry  Laboratory Data:   Lab Results   Component Value Date    WBC 19.8 07/11/2025    HGB 12.6 07/11/2025    HCT 40.6 07/11/2025    .0 07/11/2025    CREATSERUM 2.45 07/11/2025    BUN 33 07/11/2025     07/11/2025    K 4.5 07/11/2025     07/11/2025    CO2 31.0 07/11/2025     07/11/2025    CA 9.7 07/11/2025    INR 3.09 07/11/2025    PTP 32.1 07/11/2025     Recent Labs   Lab 07/10/25  0627 07/10/25  1810 07/11/25  0636   * 121* 117*   BUN 26* 25* 33*   CREATSERUM 1.77* 2.01* 2.45*   CA 9.7 9.9 9.7    138 140   K 4.6 4.4 4.5   CL 99 102 101   CO2 36.0* 33.0* 31.0     Recent Labs   Lab 07/11/25  0636   RBC 4.65   HGB 12.6   HCT 40.6   MCV 87.3   MCH 27.1   MCHC 31.0   RDW 15.2   NEPRELIM 17.98*   WBC 19.8*   .0       Recent Labs   Lab 07/09/25  1958 07/10/25  1810   ALT 10 10   AST 27 32     Upper GI: thoracic esophagus displaced due to extrinsic compression from enlarged left atrium    Assessment:   Difficulty passing SRAVANTHI probe: likely due to anatomy as seen on upper GI  Diarrhea: likely from contrast or recent Abx: C diff negative  Sore throat: from SRAVANTHI probe as she denies dysphagia prior to procedure  Plan:   F/u on stool PCR  Carafate TID x 7 days    We will sign off at this time    Estela Keene DO  6:57 PM  7/11/2025  [facility]  [phone]

## 2025-07-11 NOTE — PLAN OF CARE
Pt s/p RHC for eval of VHD. Received pt at 1930 during handoff.     A&Ox 4. Voice hoarse. Strength: WNL, some generalized weakness. 2-3L NC needed during the daytime, wore CPAP overnight. Required O2 to bled in this time. Bilateral lung sounds diminished w/ crackles. Afib w/ occasional PVCs on tele, hypotensive SBP 90s. Mild nonpitting BLE edema. Moderate neck incisional & throat pain. Tylenolgiven PRN as pain medication. Hot tea given to soothe throat.      GI/: Purewick in place for accurate I&Os.   Activity: SBA, x1 walker w/ weakness  LDA: PIV.   Skin: CDI.   Incision: RIJ neck incision CDI, soft, no hematoma. Some mild swelling noted. Old pinpoint drainage.      2059 Paged cardiology to notify of hypotension, BP 95/50, HR 80s in afib, 93% on RA. Scheduled sildenafil due but no parameters in place. Verifying if should give or hold medication.     2111 Per ROSA Tejeda parameters added to order. Orders to hold for MAP = or <65. APN verifying if 65ml is accurate IO and if pt experiencing any symptoms.     2112 RN relayed I&O inaccurate as pt continues to shift positions, displacing purewick. Explained brief soaked the other night, charted in comments. Pt is urinating. No dizziness per pt but does experience malachi SOB intermittently. Mainly r/t trauma in esophagus from failed SRAVANTHI. Pt RA at baseline, requiring 2-3L NC at times while in pt. Cxr w/ poss PNA, IV abx being given. No further orders at this time.      All medications given as ordered. Pt resting in bed w/ all safety measures in place and call light within reach. Patient updated on plan of care, all questions answered.     Plan is to continue w/ IV diuresis. Poss PO pending Cr. Still need to collect stool & sputum sample. Poss starting carafate. Pulm & PT/OT to see.     Problem: PAIN - ADULT  Goal: Verbalizes/displays adequate comfort level or patient's stated pain goal  Description: INTERVENTIONS:  - Encourage pt to monitor pain and request assistance  -  Assess pain using appropriate pain scale  - Administer analgesics based on type and severity of pain and evaluate response  - Implement non-pharmacological measures as appropriate and evaluate response  - Consider cultural and social influences on pain and pain management  - Manage/alleviate anxiety  - Utilize distraction and/or relaxation techniques  - Monitor for opioid side effects  - Notify MD/LIP if interventions unsuccessful or patient reports new pain  - Anticipate increased pain with activity and pre-medicate as appropriate  Outcome: Progressing     Problem: CARDIOVASCULAR - ADULT  Goal: Maintains optimal cardiac output and hemodynamic stability  Description: INTERVENTIONS:  - Monitor vital signs, rhythm, and trends  - Monitor for bleeding, hypotension and signs of decreased cardiac output  - Evaluate effectiveness of vasoactive medications to optimize hemodynamic stability  - Monitor arterial and/or venous puncture sites for bleeding and/or hematoma  - Assess quality of pulses, skin color and temperature  - Assess for signs of decreased coronary artery perfusion - ex. Angina  - Evaluate fluid balance, assess for edema, trend weights  Outcome: Progressing  Goal: Absence of cardiac arrhythmias or at baseline  Description: INTERVENTIONS:  - Continuous cardiac monitoring, monitor vital signs, obtain 12 lead EKG if indicated  - Evaluate effectiveness of antiarrhythmic and heart rate control medications as ordered  - Initiate emergency measures for life threatening arrhythmias  - Monitor electrolytes and administer replacement therapy as ordered  Outcome: Progressing     Problem: RESPIRATORY - ADULT  Goal: Achieves optimal ventilation and oxygenation  Description: INTERVENTIONS:  - Assess for changes in respiratory status  - Assess for changes in mentation and behavior  - Position to facilitate oxygenation and minimize respiratory effort  - Oxygen supplementation based on oxygen saturation or ABGs  - Provide  Smoking Cessation handout, if applicable  - Encourage broncho-pulmonary hygiene including cough, deep breathe, Incentive Spirometry  - Assess the need for suctioning and perform as needed  - Assess and instruct to report SOB or any respiratory difficulty  - Respiratory Therapy support as indicated  - Manage/alleviate anxiety  - Monitor for signs/symptoms of CO2 retention  Outcome: Progressing     Problem: GENITOURINARY - ADULT  Goal: Absence of urinary retention  Description: INTERVENTIONS:  - Assess patient’s ability to void and empty bladder  - Monitor intake/output and perform bladder scan as needed  - Follow urinary retention protocol/standard of care  - Consider collaborating with pharmacy to review patient's medication profile  - Implement strategies to promote bladder emptying  Outcome: Progressing     Problem: METABOLIC/FLUID AND ELECTROLYTES - ADULT  Goal: Electrolytes maintained within normal limits  Description: INTERVENTIONS:  - Monitor labs and rhythm and assess patient for signs and symptoms of electrolyte imbalances  - Administer electrolyte replacement as ordered  - Monitor response to electrolyte replacements, including rhythm and repeat lab results as appropriate  - Fluid restriction as ordered  - Instruct patient on fluid and nutrition restrictions as appropriate  Outcome: Progressing  Goal: Hemodynamic stability and optimal renal function maintained  Description: INTERVENTIONS:  - Monitor labs and assess for signs and symptoms of volume excess or deficit  - Monitor intake, output and patient weight  - Monitor urine specific gravity, serum osmolarity and serum sodium as indicated or ordered  - Monitor response to interventions for patient's volume status, including labs, urine output, blood pressure (other measures as available)  - Encourage oral intake as appropriate  - Instruct patient on fluid and nutrition restrictions as appropriate  Outcome: Progressing

## 2025-07-11 NOTE — CM/SW NOTE
07/11/25 1700   CM/SW Referral Data   Referral Source Social Work (self-referral)   Reason for Referral Discharge planning   Informant EMR;Clinical Staff Member   Patient Info   Patient's Home Environment Independent Living   Patient lives with Alone   Patient Status Prior to Admission   Independent with ADLs and Mobility Yes   Discharge Needs   Anticipated D/C needs No anticipated discharge needs;To be determined     HOME SITUATION per PT eval  Type of Home: Independent living facility  Home Layout: One level  Stairs to Enter : 0        Stairs to Bedroom: 0         Lives With: Alone (Dtr lives nearby)    Drives: Yes           Prior Level of [place] per PT eval: Pt is typically independent w/ adl's, gait w/o device, drives and does her own cooking/cleaning/shopping.  Has supportive dtr in the area.        Patient is an 78 y/o female admitted after RHC.    SW noted and acknowledged order for HH eval.     Per PT eval, no anticipated therapy needs for pt at OR.    Will monitor for any changes in needs.        to remain available for support and/or discharge planning.    NICOLAS Katz  Discharge Planner  [phone]

## 2025-07-11 NOTE — CARDIAC REHAB
Consult received for Heart Failure Education.  Clinical notes reviewed. Patient is not appropriate for Outpatient Cardiac Rehab Phase 2 program at this time.

## 2025-07-11 NOTE — PROGRESS NOTES
[facility]   part of [facility]    Advanced Heart Failure Progress Note    Lizzy Schwab Patient Status:  Inpatient    1945 MRN [id number]   Location [facility] 2NE-A Attending Harpal Vo MD   Hosp Day # 2 PCP None Pcp     Subjective:  Feels well. Denies CP, SOB, syncope. No overt LE edema. Dysphagia and weakness improving. GI evaluated. She is otherwise, comfortable, no acute distress. Unclear what her I's / O's are being on diuretic       Objective:  /57 (BP Location: Right arm)   Pulse 116   Temp 98.1 °F (36.7 °C) (Oral)   Resp 26   Ht 5' 8\" (1.727 m)   Wt 188 lb 11.4 oz (85.6 kg)   LMP  (LMP Unknown)   SpO2 95%   BMI 28.69 kg/m²     Temp (24hrs), Av.4 °F (36.9 °C), Min:97.8 °F (36.6 °C), Max:98.9 °F (37.2 °C)      Intake/Output:    Intake/Output Summary (Last 24 hours) at 2025 0949  Last data filed at 2025 0604  Gross per 24 hour   Intake 1490 ml   Output 390 ml   Net 1100 ml       Wt Readings from Last 3 Encounters:   25 188 lb 11.4 oz (85.6 kg)   23 185 lb (83.9 kg)   21 181 lb (82.1 kg)       Allergies:  Allergies[1]    Physical Exam:  Blood pressure 116/57, pulse 116, temperature 98.1 °F (36.7 °C), temperature source Oral, resp. rate 26, height 5' 8\" (1.727 m), weight 188 lb 11.4 oz (85.6 kg), SpO2 95%.    General: Alert and oriented in no apparent distress.  HEENT: No focal deficits.  Neck: pulsatile JVD present mid neck, R internal jugular vein site intact, no erythema.   Cardiac: Regular rate and rhythm, S1, S2 normal, no rubs or gallops.  Lungs: Clear without wheezes, rales, rhonchi or dullness.  Normal excursions and effort.  Abdomen: Soft, non-tender.   Extremities: no overt LE edema present.   Neurologic: Alert and oriented, normal affect.  Skin: Warm and dry.     Laboratory/Data:      Labs:  Lab Results   Component Value Date    WBC 19.8 2025    RBC 4.65 2025    HGB 12.6 2025    HCT 40.6 2025    MCV 87.3  07/11/2025    MCH 27.1 07/11/2025    MCHC 31.0 07/11/2025    RDW 15.2 07/11/2025    .0 07/11/2025     Lab Results   Component Value Date    INR 3.09 (H) 07/11/2025    INR 2.00 (H) 07/10/2025    INR 1.9 (A) 08/03/2021     No results for input(s): \"BNPML\" in the last 168 hours.  BUN (mg/dL)   Date Value   07/11/2025 33 (H)   07/10/2025 25 (H)   07/10/2025 26 (H)     Creatinine (mg/dL)   Date Value   07/11/2025 2.45 (H)   07/10/2025 2.01 (H)   07/10/2025 1.77 (H)       Medications:  Current Hospital Medications[2]    Assessment and Plan:  Problem List[3]    Acute on Chronic HFpEF, stage C, NYHA 3  - secondary to VHD progressive with fluid overload signs present objectively   - oral diuretics torsemide 20 BID (however, it is unclear accuracy of I's / O's as it states she only made 390 ml of urine output the last 24 hours while on high dose diuretic), GDMT otherwise, and outpatient structural evaluation     Permanent AF on warfarin   - warfarin daily, INR checks, monitor on tele.     Severe MR / TR   - elevated filling pressures on RHC, with fluid overload, and diuresis well  - oral diuretics, GDMT optimization.     HTN   - GDMT titration.     YOEL on CKD 3  - trend labs, diuresis as above. Appreciate nephrology management.     Dysphagia with weakness  - GI management.     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     Harpal Vo MD   Advanced Heart Failure and Transplant Cardiology   [facility] ([facility])     L3         [1] No Known Allergies  [2]   Current Facility-Administered Medications   Medication Dose Route Frequency    torsemide (Demadex) tab 20 mg  20 mg Oral BID (Diuretic)    sildenafil (Revatio) tab 10 mg  10 mg Oral TID    carvedilol (Coreg) tab 12.5 mg  12.5 mg Oral BID with meals    doxycycline (Vibramycin) 100 mg in sodium chloride 0.9% 100mL IVPB-Real  100 mg Intravenous Q12H    cefTRIAXone  (Rocephin) 2 g in sodium chloride 0.9% 100mL IVPB-HAYDE  2 g Intravenous Q24H    digoxin (Lanoxin) tab 125 mcg  125 mcg Oral Daily    lisinopril (Zestril) tab 10 mg  10 mg Oral Daily    warfarin (Coumadin) tab 2 mg  2 mg Oral Nightly    traMADol (Ultram) tab 50 mg  50 mg Oral Q6H PRN    acetaminophen (Tylenol) tab 650 mg  650 mg Oral Q6H PRN     Facility-Administered Medications Ordered in Other Encounters   Medication Dose Route Frequency    acetaminophen (Tylenol) tab 650 mg  650 mg Oral Q6H PRN   [3]   Patient Active Problem List  Diagnosis    Atrial fibrillation (HCC)    Hypertension    Obesity    Hyponatremia    Hyperglycemia    Acute on chronic congestive heart failure (HCC)    Coagulopathy (HCC)    Acute on chronic congestive heart failure, unspecified heart failure type (HCC)    Supratherapeutic INR    Pulmonary hypertension (HCC)    YOEL (acute kidney injury)    Stage 3a chronic kidney disease (HCC)    Apnea    Snoring    Iron deficiency anemia secondary to inadequate dietary iron intake    Dyspnea on exertion    Dysphagia    Chronic congestive heart failure (HCC)    Acute pharyngitis    Anterior cervical lymphadenopathy    Pneumonia of right lower lobe due to infectious organism    Stage 3 chronic kidney disease (HCC)    Acute on chronic heart failure with preserved ejection fraction (HCC)    Acute kidney injury

## 2025-07-11 NOTE — PLAN OF CARE
Notified of bp 95/50 (65) HR 77 afib given hx of diuresis informed pt moves in bed and purwik not in place incontinent unclear how much Patient is asymptomatic no dizziness nor any other symptoms per nurse.  Pt feels sob intermittently rt esophagus  Pt on 2L Oxygen. Held sildenafil x1 dose given above. Remains on diuresis 80 mg IV bid

## 2025-07-11 NOTE — PROGRESS NOTES
[facility]   part of [facility] Hospitalist Progress Note     Lizzy Schwab Patient Status:  Inpatient    1945 MRN [id number]   Location [facility] 2NE-A Attending Harpal Vo MD   Hosp Day # 2 PCP None Pcp     Chief Complaint: Medical management    Subjective:     Patient starting to feel better according to patient.  Neck swelling and sore throat improving.  Right-sided chest discomfort improving.  Afebrile.  Still requiring 2 to 4 L of oxygen via nasal cannula.  Episodic tachycardia and tachypnea.  Had diarrhea last night and today after the Gastrografin test done by GI yesterday    Objective:    Review of Systems:   A comprehensive review of systems was completed; pertinent positive and negatives stated in subjective.    Vital signs:  Temp:  [98.1 °F (36.7 °C)-98.9 °F (37.2 °C)] 98.7 °F (37.1 °C)  Pulse:  [] 117  Resp:  [23-33] 26  BP: ()/(49-61) 108/61  SpO2:  [84 %-96 %] 89 %    Physical Exam:    General: No acute distress.   HEENT: Moist mucous membranes.  Tender anterior cervical lymphadenopathy, pharyngeal erythema  Neck: Right-sided neck right heart cath access site in dressing and dressing looks dry.  Respiratory: Clear to auscultation bilaterally.  No wheezes. No rhonchi.  Cardiovascular: S1, S2.  Regular rate and rhythm.    Abdomen: Soft, nontender, nondistended.  Positive bowel sounds.   Neurologic: Awake alert oriented, no focal neurological deficits.  Musculoskeletal: Full range of motion of all extremities.  No pedal edema or calf tenderness  Psychiatric: Appropriate mood and affect.       Diagnostic Data:    Labs:  Recent Labs   Lab 07/09/25  1958 07/10/25  0627 07/10/25  1810 07/11/25  0636   WBC 14.8* 20.0* 18.5* 19.8*   HGB 12.7 12.9 12.8 12.6   MCV 86.9 89.0 89.0 87.3   .0 380.0 383.0 360.0   INR  --  2.00*  --  3.09*       Recent Labs   Lab 07/09/25  1958 07/10/25  0627 07/10/25  1810 07/11/25  0636   * 120* 121* 117*   BUN 21 26* 25* 33*    CREATSERUM 1.47* 1.77* 2.01* 2.45*   CA 9.7 9.7 9.9 9.7   ALB 4.5  --  4.7  --     140 138 140   K 4.6 4.6 4.4 4.5    99 102 101   CO2 34.0* 36.0* 33.0* 31.0   ALKPHO 80  --  70  --    AST 27  --  32  --    ALT 10  --  10  --    BILT 1.0  --  0.6  --    TP 8.0  --  8.4*  --        Estimated Glomerular Filtration Rate: 19 mL/min/1.73m2 (A) (result from lab).    No results for input(s): \"TROP\", \"TROPHS\", \"CK\" in the last 168 hours.    Recent Labs   Lab 07/10/25  0627 07/11/25  0636   PTP 22.9* 32.1*   INR 2.00* 3.09*                  Microbiology    No results found for this visit on 07/09/25.      Imaging: Reviewed in Epic.  Chest x-ray done on 7/9/2025 night results reviewed as below  XR CHEST AP PORTABLE  (CPT=71045)    INDICATIONS: chf    COMPARISON: 4/13/2021    FINDINGS:  Cardiomegaly with mild pulmonary vascular congestion, increased interstitial markings the lungs, patchy groundglass opacity in the lower lungs is concerning for congestive failure with pulmonary edema. There is patchy consolidation in the right lower  lobe with superimposed pneumonia not excluded. Clinical correlation recommended. Trace bilateral pleural effusions. No pneumothorax. Degenerative change in the spine.    Impression   CONCLUSION:    Cardiomegaly with mild pulmonary vascular congestion, increased interstitial markings the lungs, patchy groundglass opacity in the lower lungs is concerning for congestive failure with pulmonary edema. There is patchy consolidation in the right lower  lobe with superimposed pneumonia not excluded. Clinical correlation recommended. Trace bilateral pleural effusions.      Addendum-x-ray esophagram ordered by GI done on 7/10/2025 resulted as below  XR UGI/ESOPHAGUS DOUBLE CONTRAST (CPT=74246)    INDICATIONS: Throat pain    PATIENT STATED HISTORY: Patient states she had a procedure done yesterday where they inserted a camera down her throat and it failed. Shares she now has neck/throat  pain.    COMPARISON: There are no comparisons for this exam.    TECHNIQUE: Single contrast upper GI and esophagram was performed with water-soluble contrast.    Fluoro/Cine Image Number: [id number]  Fluoro Time: 2 minutes 25 seconds  Radiation Dose: Ugy/m2(m squared)  Technologist Time: 45 minutes    FINDINGS:    No abnormality is seen in the pharynx and cervical esophagus. There is diminished primary peristalsis of the thoracic esophagus at the thoracic esophagus is displaced posteriorly below the tobias, consistent with extrinsic displacement by an enlarged  left atrium. No esophageal stricture is identified. The mucosal outline of the stomach and duodenum appear normal. There is limited distention of the stomach due to small quantity of ingested material. No hiatal hernia or GE reflux was observed  fluoroscopically.      Impression   CONCLUSION:    Posterior and leftward displacement of mid and distal thoracic esophagus secondary to enlarged left atrium. Diminished primary esophageal peristalsis. No esophageal stricture.     Medications:    torsemide  20 mg Oral BID (Diuretic)    sildenafil  10 mg Oral TID    carvedilol  12.5 mg Oral BID with meals    acidophilus  1 each Oral Daily    doxycycline  100 mg Intravenous Q12H    cefTRIAXone  2 g Intravenous Q24H    digoxin  125 mcg Oral Daily    lisinopril  10 mg Oral Daily    [Held by provider] warfarin  2 mg Oral Nightly       Assessment & Plan:    Admitted for observation by cardiology status post cardiac angiogram right heart cath And status post attempted SRAVANTHI-managed by cardiology     #Transient dysphagia and sore throat possibly due to acute pharyngitis with anterior cervical tender lymphadenopathy  #Right lower lobe pneumonia seen on chest x-ray  #Leukocytosis due to above  Continue on IV antibiotics  Patient has no crepitus on exam.  No pharyngeal erythema.  Denies any heartburns.  White count increasing today to 20 with elevated neutrophil count also.  Continue IV  antibiotics and follow CBC in a.m.  Procalcitonin elevated to 11.21 on admission  Blood culture and throat culture and sputum culture ordered  Respiratory panel flu panel negative  Pulmonary consult  #Hypertension  Follow-up blood pressure  Patient on Coreg at home which we will plan to continue  #Atrial fibrillation  Monitor on telemetry  On chart review patient on Coreg, digoxin  Hold home warfarin as INR 3.09 today, follow INR in a.m.  #Acute on chronic preserved ejection fraction CHF, chronic valvular heart disease with severe mitral regurgitation and tricuspid regurgitation  Cardiology following  Cardiac medications per cardiology.  On chart review patient on Coreg, lisinopril, digoxin  Chest x-ray done on 7/9/2025 with cardiomegaly with mild pulmonary vascular congestion and increased interstitial opacity in lower lungs concerning for CHF with pulmonary edema.  Also with patchy consolidation right lower lobe with superimposed pneumonia not excluded.  Status post IV Bumex per cardiology given on 7/9/2025  IV Lasix 80 mg IV daily started by cardiology on 7/9/2025  Daily weight  I/O chart  CHF protocol  #CKD stage III  this was discussed with patient and patient's daughter at bedside and looked at old labs in Care Everywhere from 2021 and 2024 and reviewed this with patient and daughter  Creatinine increasing with diuresis, will also have nephrology on consult  #Diarrhea possibly related to the Gastrografin patient received for the x-ray upper GI esophagus double contrast study  Checked stool C. difficile which came back negative  Symptomatic management  Probiotic while on antibiotics    Discussed with patient, RN    Called patient's daughter at below number and updated regarding patient  Marline Schwab Daughter   [phone]     Cammy Singh MD    Supplementary Documentation:     Quality:  DVT Mechanical Prophylaxis:        DVT Pharmacologic Prophylaxis   Medication    [Held by provider] warfarin (Coumadin)  tab 2 mg                Code Status: Not on file  Bridges: External urinary catheter in place  Bridges Duration (in days):   Central line:    JARED:     Discharge is dependent on: Clinical progress  At this point Ms. Schwab is expected to be discharge to: Home     The 21st Century Cures Act makes medical notes like these available to patients in the interest of transparency. Please be advised this is a medical document. Medical documents are intended to carry relevant information, facts as evident, and the clinical opinion of the practitioner. The medical note is intended as peer to peer communication and may appear blunt or direct. It is written in medical language and may contain abbreviations or verbiage that are unfamiliar.

## 2025-07-11 NOTE — DIETARY NOTE
[facility]   part of [facility]   CLINICAL NUTRITION    Consult received for CHF education. After reviewing patient's clinical condition and care plan, it has been determined that education is not appropriate at this time.     Please consult if patient status changes or nutrition issues arise.    Adelso Cerrato RD, KRISTENN  Clinical Nutrition   Available via Epic secure chat

## 2025-07-11 NOTE — PROGRESS NOTES
[facility]  Nephrology Progress Note    Lizzy Schwab Patient Status:  Inpatient    1945 MRN [id number]   Formerly [facility] 2NE-A Attending Harpal Vo MD   Hosp Day # 2 PCP None Pcp     Subjective:  Reports that her breathing seems to be better. UOP low. Daughter at bedside.     Objective:  Vital signs: Blood pressure 116/57, pulse 72, temperature 98.1 °F (36.7 °C), temperature source Oral, resp. rate (!) 27, height 5' 8\" (1.727 m), weight 188 lb 11.4 oz (85.6 kg), SpO2 95%.  General: No acute distress. Alert and oriented x 3.  HEENT: Moist mucous membranes.   Respiratory: CTA  Cardiovascular: S1, S2.  Regular rate and rhythm.    Abdomen: Soft, nontender, nondistended.   Neurologic: No focal neurological deficits.  Musculoskeletal:  No swelling noted.  Integument: No lesions. No erythema.  Psychiatric: Appropriate mood and affect.    Current Hospital Medications[1]      Recent Labs   Lab 07/09/25  1958 07/10/25  0627 07/10/25  1810 07/11/25  0636   WBC 14.8* 20.0* 18.5* 19.8*   HGB 12.7 12.9 12.8 12.6   MCV 86.9 89.0 89.0 87.3   .0 380.0 383.0 360.0   INR  --  2.00*  --  3.09*       Recent Labs   Lab 07/09/25  1958 07/10/25  0627 07/10/25  1810 07/11/25  0636    140 138 140   K 4.6 4.6 4.4 4.5    99 102 101   CO2 34.0* 36.0* 33.0* 31.0   BUN 21 26* 25* 33*   CREATSERUM 1.47* 1.77* 2.01* 2.45*   CA 9.7 9.7 9.9 9.7   * 120* 121* 117*       Recent Labs   Lab 07/09/25  1958 07/10/25  1810   ALT 10 10   AST 27 32   ALB 4.5 4.7       No results for input(s): \"PGLU\" in the last 168 hours.      Assessment/Plan:  1) Acute kidney injury on CKD3b: Baseline serum creatinine 1.3-1.5 mg/dL, likely in setting of long-standing HTN and heart failure. Current acute kidney injury likely related to aggressive diuresis as well as possible ATN (possible BP fluctuations during procedures on ). Follow renal function daily.     2) Acute on chronic HFpEF: due to valvular heart disease.  Appears compensated on exam today. On GDMT per cardiology. Agree with PO diuretics today.      3) Dysphagia: GI following, she reports symptoms improved this AM.     4) Afib: on coumadin    Thank you for allowing me to participate in this patient's care. Please feel free to call me with any questions or concerns.     Georgette Chaves MD  07/11/25       [1]   Current Facility-Administered Medications   Medication Dose Route Frequency    torsemide (Demadex) tab 20 mg  20 mg Oral BID (Diuretic)    sildenafil (Revatio) tab 10 mg  10 mg Oral TID    carvedilol (Coreg) tab 12.5 mg  12.5 mg Oral BID with meals    acidophilus (Probiotic) cap/tab 1 each  1 each Oral Daily    doxycycline (Vibramycin) 100 mg in sodium chloride 0.9% 100mL IVPB-[place]  100 mg Intravenous Q12H    cefTRIAXone (Rocephin) 2 g in sodium chloride 0.9% 100mL IVPB-HAYDE  2 g Intravenous Q24H    digoxin (Lanoxin) tab 125 mcg  125 mcg Oral Daily    lisinopril (Zestril) tab 10 mg  10 mg Oral Daily    [Held by provider] warfarin (Coumadin) tab 2 mg  2 mg Oral Nightly    traMADol (Ultram) tab 50 mg  50 mg Oral Q6H PRN    acetaminophen (Tylenol) tab 650 mg  650 mg Oral Q6H PRN     Facility-Administered Medications Ordered in Other Encounters   Medication Dose Route Frequency    acetaminophen (Tylenol) tab 650 mg  650 mg Oral Q6H PRN

## 2025-07-12 LAB
ANION GAP SERPL CALC-SCNC: 11 MMOL/L (ref 0–18)
BASOPHILS # BLD AUTO: 0.02 X10(3) UL (ref 0–0.2)
BASOPHILS NFR BLD AUTO: 0.1 %
BUN BLD-MCNC: 51 MG/DL (ref 9–23)
CALCIUM BLD-MCNC: 9.6 MG/DL (ref 8.7–10.6)
CHLORIDE SERPL-SCNC: 102 MMOL/L (ref 98–112)
CO2 SERPL-SCNC: 27 MMOL/L (ref 21–32)
CREAT BLD-MCNC: 3.23 MG/DL (ref 0.55–1.02)
EGFRCR SERPLBLD CKD-EPI 2021: 14 ML/MIN/1.73M2 (ref 60–?)
EOSINOPHIL # BLD AUTO: 0 X10(3) UL (ref 0–0.7)
EOSINOPHIL NFR BLD AUTO: 0 %
ERYTHROCYTE [DISTWIDTH] IN BLOOD BY AUTOMATED COUNT: 15.3 %
GLUCOSE BLD-MCNC: 116 MG/DL (ref 70–99)
HCT VFR BLD AUTO: 38.8 % (ref 35–48)
HGB BLD-MCNC: 12.3 G/DL (ref 12–16)
IMM GRANULOCYTES # BLD AUTO: 0.23 X10(3) UL (ref 0–1)
IMM GRANULOCYTES NFR BLD: 1.2 %
INR BLD: 3.6 (ref 0.8–1.2)
LYMPHOCYTES # BLD AUTO: 0.58 X10(3) UL (ref 1–4)
LYMPHOCYTES NFR BLD AUTO: 3.1 %
MCH RBC QN AUTO: 27.6 PG (ref 26–34)
MCHC RBC AUTO-ENTMCNC: 31.7 G/DL (ref 31–37)
MCV RBC AUTO: 87.2 FL (ref 80–100)
MONOCYTES # BLD AUTO: 0.94 X10(3) UL (ref 0.1–1)
MONOCYTES NFR BLD AUTO: 5 %
NEUTROPHILS # BLD AUTO: 17.09 X10 (3) UL (ref 1.5–7.7)
NEUTROPHILS # BLD AUTO: 17.09 X10(3) UL (ref 1.5–7.7)
NEUTROPHILS NFR BLD AUTO: 90.6 %
OSMOLALITY SERPL CALC.SUM OF ELEC: 305 MOSM/KG (ref 275–295)
PLATELET # BLD AUTO: 403 10(3)UL (ref 150–450)
POTASSIUM SERPL-SCNC: 4.6 MMOL/L (ref 3.5–5.1)
PROTHROMBIN TIME: 36.2 SECONDS (ref 11.6–14.8)
RBC # BLD AUTO: 4.45 X10(6)UL (ref 3.8–5.3)
SODIUM SERPL-SCNC: 140 MMOL/L (ref 136–145)
WBC # BLD AUTO: 18.9 X10(3) UL (ref 4–11)

## 2025-07-12 PROCEDURE — 99232 SBSQ HOSP IP/OBS MODERATE 35: CPT | Performed by: INTERNAL MEDICINE

## 2025-07-12 PROCEDURE — 99233 SBSQ HOSP IP/OBS HIGH 50: CPT | Performed by: INTERNAL MEDICINE

## 2025-07-12 RX ADMIN — CARVEDILOL 12.5 MG: 12.5 TABLET ORAL at 17:36:00

## 2025-07-12 RX ADMIN — DIGOXIN 125 MCG: 125 MCG TABLET ORAL at 08:53:00

## 2025-07-12 RX ADMIN — SUCRALFATE ORAL 1 G: 1 SUSPENSION ORAL at 17:36:00

## 2025-07-12 RX ADMIN — SILDENAFIL CITRATE 10 MG: 20 TABLET ORAL at 08:53:00

## 2025-07-12 RX ADMIN — LACTOBACILLUS ACIDOPHILUS 1 EACH: 500MM CELL CAPSULE ORAL at 08:53:00

## 2025-07-12 RX ADMIN — SILDENAFIL CITRATE 10 MG: 20 TABLET ORAL at 17:36:00

## 2025-07-12 RX ADMIN — SUCRALFATE ORAL 1 G: 1 SUSPENSION ORAL at 10:30:00

## 2025-07-12 RX ADMIN — SUCRALFATE ORAL 1 G: 1 SUSPENSION ORAL at 20:54:00

## 2025-07-12 RX ADMIN — SILDENAFIL CITRATE 10 MG: 20 TABLET ORAL at 20:55:00

## 2025-07-12 RX ADMIN — CARVEDILOL 12.5 MG: 12.5 TABLET ORAL at 08:53:00

## 2025-07-12 RX ADMIN — SUCRALFATE ORAL 1 G: 1 SUSPENSION ORAL at 06:27:00

## 2025-07-12 NOTE — PLAN OF CARE
Received pt at shift change. AxOx4. 1L O2 w stats maintaining >90%. Denies pain/SOB. Conested cough. Vitals stable. Afib w controlled rates on tele.   See flowsheets for additional assessments.   Pt updated on POC. Call light within reach. All needs met at this time.     Plan:  -PO torsemide on hold - Cr 3.23 this am  -IV Rocephin & Vibramycin  -DW // I&O    Problem: PAIN - ADULT  Goal: Verbalizes/displays adequate comfort level or patient's stated pain goal  Description: INTERVENTIONS:  - Encourage pt to monitor pain and request assistance  - Assess pain using appropriate pain scale  - Administer analgesics based on type and severity of pain and evaluate response  - Implement non-pharmacological measures as appropriate and evaluate response  - Consider cultural and social influences on pain and pain management  - Manage/alleviate anxiety  - Utilize distraction and/or relaxation techniques  - Monitor for opioid side effects  - Notify MD/LIP if interventions unsuccessful or patient reports new pain  - Anticipate increased pain with activity and pre-medicate as appropriate  Outcome: Progressing     Problem: CARDIOVASCULAR - ADULT  Goal: Maintains optimal cardiac output and hemodynamic stability  Description: INTERVENTIONS:  - Monitor vital signs, rhythm, and trends  - Monitor for bleeding, hypotension and signs of decreased cardiac output  - Evaluate effectiveness of vasoactive medications to optimize hemodynamic stability  - Monitor arterial and/or venous puncture sites for bleeding and/or hematoma  - Assess quality of pulses, skin color and temperature  - Assess for signs of decreased coronary artery perfusion - ex. Angina  - Evaluate fluid balance, assess for edema, trend weights  Outcome: Progressing  Goal: Absence of cardiac arrhythmias or at baseline  Description: INTERVENTIONS:  - Continuous cardiac monitoring, monitor vital signs, obtain 12 lead EKG if indicated  - Evaluate effectiveness of antiarrhythmic and  heart rate control medications as ordered  - Initiate emergency measures for life threatening arrhythmias  - Monitor electrolytes and administer replacement therapy as ordered  Outcome: Progressing     Problem: RESPIRATORY - ADULT  Goal: Achieves optimal ventilation and oxygenation  Description: INTERVENTIONS:  - Assess for changes in respiratory status  - Assess for changes in mentation and behavior  - Position to facilitate oxygenation and minimize respiratory effort  - Oxygen supplementation based on oxygen saturation or ABGs  - Provide Smoking Cessation handout, if applicable  - Encourage broncho-pulmonary hygiene including cough, deep breathe, Incentive Spirometry  - Assess the need for suctioning and perform as needed  - Assess and instruct to report SOB or any respiratory difficulty  - Respiratory Therapy support as indicated  - Manage/alleviate anxiety  - Monitor for signs/symptoms of CO2 retention  Outcome: Progressing     Problem: GENITOURINARY - ADULT  Goal: Absence of urinary retention  Description: INTERVENTIONS:  - Assess patient’s ability to void and empty bladder  - Monitor intake/output and perform bladder scan as needed  - Follow urinary retention protocol/standard of care  - Consider collaborating with pharmacy to review patient's medication profile  - Implement strategies to promote bladder emptying  Outcome: Progressing     Problem: METABOLIC/FLUID AND ELECTROLYTES - ADULT  Goal: Electrolytes maintained within normal limits  Description: INTERVENTIONS:  - Monitor labs and rhythm and assess patient for signs and symptoms of electrolyte imbalances  - Administer electrolyte replacement as ordered  - Monitor response to electrolyte replacements, including rhythm and repeat lab results as appropriate  - Fluid restriction as ordered  - Instruct patient on fluid and nutrition restrictions as appropriate  Outcome: Progressing  Goal: Hemodynamic stability and optimal renal function  maintained  Description: INTERVENTIONS:  - Monitor labs and assess for signs and symptoms of volume excess or deficit  - Monitor intake, output and patient weight  - Monitor urine specific gravity, serum osmolarity and serum sodium as indicated or ordered  - Monitor response to interventions for patient's volume status, including labs, urine output, blood pressure (other measures as available)  - Encourage oral intake as appropriate  - Instruct patient on fluid and nutrition restrictions as appropriate  Outcome: Progressing

## 2025-07-12 NOTE — PROGRESS NOTES
[facility]  Nephrology Progress Note    Lizzy Schwab Patient Status:  Inpatient    1945 MRN [id number]   Formerly [facility] 2NE-A Attending Harpal Vo MD   Hosp Day # 3 PCP None Pcp     Subjective:  Feels that her breathing is better. Still with some pain with swallowing. UOP remains low    Objective:  Vital signs: Blood pressure 107/50, pulse 103, temperature 98 °F (36.7 °C), temperature source Oral, resp. rate (!) 40, height 5' 8\" (1.727 m), weight 186 lb 15.2 oz (84.8 kg), SpO2 100%.  General: No acute distress. Alert and oriented x 3.  HEENT: Moist mucous membranes.   Respiratory: CTA  Cardiovascular: S1, S2.  Regular rate and rhythm.    Abdomen: Soft, nontender, nondistended.   Neurologic: No focal neurological deficits.  Musculoskeletal:  No swelling noted.  Integument: No lesions. No erythema.  Psychiatric: Appropriate mood and affect.    Current Hospital Medications[1]      Recent Labs   Lab 07/09/25  1958 07/10/25  0627 07/10/25  1810 07/11/25  0636   WBC 14.8* 20.0* 18.5* 19.8*   HGB 12.7 12.9 12.8 12.6   MCV 86.9 89.0 89.0 87.3   .0 380.0 383.0 360.0   INR  --  2.00*  --  3.09*       Recent Labs   Lab 07/09/25  1958 07/10/25  0627 07/10/25  1810 07/11/25  0636 25  0633    140 138 140 140   K 4.6 4.6 4.4 4.5 4.6    99 102 101 102   CO2 34.0* 36.0* 33.0* 31.0 27.0   BUN 21 26* 25* 33* 51*   CREATSERUM 1.47* 1.77* 2.01* 2.45* 3.23*   CA 9.7 9.7 9.9 9.7 9.6   * 120* 121* 117* 116*       Recent Labs   Lab 07/09/25  1958 07/10/25  1810   ALT 10 10   AST 27 32   ALB 4.5 4.7       No results for input(s): \"PGLU\" in the last 168 hours.      Assessment/Plan:  1) Acute kidney injury on CKD3b: Baseline serum creatinine 1.3-1.5 mg/dL, likely in setting of long-standing HTN and heart failure. Current acute kidney injury likely related to aggressive diuresis as well as ATN (possible BP fluctuations during procedures on  as well as low Bps noted during admission).  Will hold torsemide today and encourage PO intake. Follow renal function daily.     2) Acute on chronic HFpEF: due to valvular heart disease. Appears compensated on exam today. On GDMT per cardiology. Hold diuretics today     3) Dysphagia: GI following, she reports symptoms continue to improve this AM.     4) Afib: on coumadin    Discussed with patient and daughter at bedside.     Thank you for allowing me to participate in this patient's care. Please feel free to call me with any questions or concerns.     Georgette Chaves MD  07/12/25       [1]   Current Facility-Administered Medications   Medication Dose Route Frequency    [Held by provider] torsemide (Demadex) tab 20 mg  20 mg Oral BID (Diuretic)    sildenafil (Revatio) tab 10 mg  10 mg Oral TID    carvedilol (Coreg) tab 12.5 mg  12.5 mg Oral BID with meals    acidophilus (Probiotic) cap/tab 1 each  1 each Oral Daily    sucralfate (Carafate) 1 GM/10ML oral suspension 1 g  1 g Oral TID AC and HS (2200)    phenol (Chloraseptic) 1.4 % oral liquid spray   Oral Q2H PRN    benzocaine-menthol (Cepacol) lozenge 1 lozenge  1 lozenge Oral PRN    doxycycline (Vibramycin) 100 mg in sodium chloride 0.9% 100mL IVPB-[place]  100 mg Intravenous Q12H    cefTRIAXone (Rocephin) 2 g in sodium chloride 0.9% 100mL IVPB-HAYDE  2 g Intravenous Q24H    digoxin (Lanoxin) tab 125 mcg  125 mcg Oral Daily    lisinopril (Zestril) tab 10 mg  10 mg Oral Daily    traMADol (Ultram) tab 50 mg  50 mg Oral Q6H PRN    acetaminophen (Tylenol) tab 650 mg  650 mg Oral Q6H PRN     Facility-Administered Medications Ordered in Other Encounters   Medication Dose Route Frequency    acetaminophen (Tylenol) tab 650 mg  650 mg Oral Q6H PRN

## 2025-07-12 NOTE — PROGRESS NOTES
[facility]   part of [facility]     Hospitalist Progress Note     Lizzy Schwab Patient Status:  Inpatient    1945 MRN [id number]   Location [facility] 2NE-A Attending Harpal Vo MD   Hosp Day # 3 PCP None Pcp     Chief Complaint: Medical management    Subjective:     Patient seen with patient's daughter at bedside.  Neck swelling and sore throat improving.  Bringing up a lot of phlegm today .  Remains on 2 L oxygen by nasal cannula.  Afebrile today.    Objective:    Review of Systems:   A comprehensive review of systems was completed; pertinent positive and negatives stated in subjective.    Vital signs:  Temp:  [97 °F (36.1 °C)-98.6 °F (37 °C)] 97.8 °F (36.6 °C)  Pulse:  [] 90  Resp:  [17-40] 23  BP: ()/(45-76) 102/64  SpO2:  [91 %-100 %] 96 %    Physical Exam:    /64 (BP Location: Right arm)   Pulse 90   Temp 97.8 °F (36.6 °C) (Oral)   Resp 23   Ht 5' 8\" (1.727 m)   Wt 186 lb 15.2 oz (84.8 kg)   LMP  (LMP Unknown)   SpO2 96%   BMI 28.43 kg/m²     General: No acute distress.   HEENT: Moist mucous membranes.  Tender anterior cervical lymphadenopathy, pharyngeal erythema improving significantly  Neck: Right-sided neck right heart cath access site in dressing and dressing looks dry.  Respiratory: Clear to auscultation bilaterally.  No wheezes. No rhonchi.  Cardiovascular: S1, S2.  Regular rate and rhythm.    Abdomen: Soft, nontender, nondistended.  Positive bowel sounds.   Neurologic: Awake alert oriented, no focal neurological deficits.  Musculoskeletal: Full range of motion of all extremities.  No pedal edema or calf tenderness  Psychiatric: Appropriate mood and affect.       Diagnostic Data:    Labs:  Recent Labs   Lab 07/09/25  1958 07/10/25  0627 07/10/25  1810 07/11/25  0636   WBC 14.8* 20.0* 18.5* 19.8*   HGB 12.7 12.9 12.8 12.6   MCV 86.9 89.0 89.0 87.3   .0 380.0 383.0 360.0   INR  --  2.00*  --  3.09*       Recent Labs   Lab 25  07/10/25  0627 07/10/25  1810 07/11/25  0636 07/12/25  0633   *   < > 121* 117* 116*   BUN 21   < > 25* 33* 51*   CREATSERUM 1.47*   < > 2.01* 2.45* 3.23*   CA 9.7   < > 9.9 9.7 9.6   ALB 4.5  --  4.7  --   --       < > 138 140 140   K 4.6   < > 4.4 4.5 4.6      < > 102 101 102   CO2 34.0*   < > 33.0* 31.0 27.0   ALKPHO 80  --  70  --   --    AST 27  --  32  --   --    ALT 10  --  10  --   --    BILT 1.0  --  0.6  --   --    TP 8.0  --  8.4*  --   --     < > = values in this interval not displayed.       Estimated Glomerular Filtration Rate: 14 mL/min/1.73m2 (A) (result from lab).    No results for input(s): \"TROP\", \"TROPHS\", \"CK\" in the last 168 hours.    Recent Labs   Lab 07/10/25  0627 07/11/25  0636   PTP 22.9* 32.1*   INR 2.00* 3.09*                  Microbiology    Hospital Encounter on 07/09/25   1. Blood Culture     Status: None (Preliminary result)    Collection Time: 07/10/25  9:05 AM    Specimen: Blood,peripheral   Result Value Ref Range    Blood Culture Result No Growth 1 Day N/A         Imaging: Reviewed in Epic.  Chest x-ray done on 7/9/2025 night results reviewed as below  XR CHEST AP PORTABLE  (CPT=71045)    INDICATIONS: chf    COMPARISON: 4/13/2021    FINDINGS:  Cardiomegaly with mild pulmonary vascular congestion, increased interstitial markings the lungs, patchy groundglass opacity in the lower lungs is concerning for congestive failure with pulmonary edema. There is patchy consolidation in the right lower  lobe with superimposed pneumonia not excluded. Clinical correlation recommended. Trace bilateral pleural effusions. No pneumothorax. Degenerative change in the spine.    Impression   CONCLUSION:    Cardiomegaly with mild pulmonary vascular congestion, increased interstitial markings the lungs, patchy groundglass opacity in the lower lungs is concerning for congestive failure with pulmonary edema. There is patchy consolidation in the right lower  lobe with superimposed  pneumonia not excluded. Clinical correlation recommended. Trace bilateral pleural effusions.      Addendum-x-ray esophagram ordered by GI done on 7/10/2025 resulted as below  XR UGI/ESOPHAGUS DOUBLE CONTRAST (CPT=74246)    INDICATIONS: Throat pain    PATIENT STATED HISTORY: Patient states she had a procedure done yesterday where they inserted a camera down her throat and it failed. Shares she now has neck/throat pain.    COMPARISON: There are no comparisons for this exam.    TECHNIQUE: Single contrast upper GI and esophagram was performed with water-soluble contrast.    Fluoro/Cine Image Number: [id number]  Fluoro Time: 2 minutes 25 seconds  Radiation Dose: Ugy/m2(m squared)  Technologist Time: 45 minutes    FINDINGS:    No abnormality is seen in the pharynx and cervical esophagus. There is diminished primary peristalsis of the thoracic esophagus at the thoracic esophagus is displaced posteriorly below the tobias, consistent with extrinsic displacement by an enlarged  left atrium. No esophageal stricture is identified. The mucosal outline of the stomach and duodenum appear normal. There is limited distention of the stomach due to small quantity of ingested material. No hiatal hernia or GE reflux was observed  fluoroscopically.      Impression   CONCLUSION:    Posterior and leftward displacement of mid and distal thoracic esophagus secondary to enlarged left atrium. Diminished primary esophageal peristalsis. No esophageal stricture.     Medications:    [Held by provider] torsemide  20 mg Oral BID (Diuretic)    sildenafil  10 mg Oral TID    carvedilol  12.5 mg Oral BID with meals    acidophilus  1 each Oral Daily    sucralfate  1 g Oral TID AC and HS (2200)    doxycycline  100 mg Intravenous Q12H    cefTRIAXone  2 g Intravenous Q24H    digoxin  125 mcg Oral Daily    [Held by provider] lisinopril  10 mg Oral Daily       Assessment & Plan:    Admitted for observation by cardiology status post cardiac angiogram right heart cath  And status post attempted SRAVANTHI-managed by cardiology     #Transient dysphagia and sore throat possibly due to acute pharyngitis with anterior cervical tender lymphadenopathy  #Right lower lobe pneumonia seen on chest x-ray  #Leukocytosis due to above  Continue on IV antibiotics  Patient has no crepitus on exam.  No pharyngeal erythema.  Denies any heartburns.  White count increasing today to 20 with elevated neutrophil count also.  Continue IV antibiotics and follow CBC in a.m.  Procalcitonin elevated to 11.21 on admission  Blood culture and throat culture and sputum culture ordered  Respiratory panel flu panel negative  Pulmonary consult  #Hypertension  Follow-up blood pressure  Patient on Coreg at home which we will plan to continue  #Atrial fibrillation  Monitor on telemetry  On chart review patient on Coreg, digoxin  Hold home warfarin as INR 3.09 on 7/11/2025, follow INR daily  #Acute on chronic preserved ejection fraction CHF, chronic valvular heart disease with severe mitral regurgitation and tricuspid regurgitation  Cardiology following  Cardiac medications per cardiology.  On chart review patient on Coreg, lisinopril, digoxin  Chest x-ray done on 7/9/2025 with cardiomegaly with mild pulmonary vascular congestion and increased interstitial opacity in lower lungs concerning for CHF with pulmonary edema.  Also with patchy consolidation right lower lobe with superimposed pneumonia not excluded.  Status post IV Bumex per cardiology given on 7/9/2025  Status post IV Lasix 80 mg IV daily started by cardiology on 7/9/2025 which was held from 7/11/2025.  Last dose of IV Lasix was on evening of 7/10/2025.  Patient received torsemide on 7/11/2025, which was held on 7/12/2025 as creatinine increasing.  Daily weight  I/O chart  CHF protocol  # Acute kidney injury on CKD stage III  this was discussed with patient and patient's daughter at bedside and looked at old labs in Care Everywhere from 2021 and 2024 and reviewed  this with patient and daughter  Creatinine increasing, nephrology on consult with holding all diuretics and lisinopril at this time.  Follow BMP in a.m.  #Diarrhea possibly related to the Gastrografin patient received for the x-ray upper GI esophagus double contrast study  Checked stool C. difficile which came back negative  Symptomatic management  Probiotic while on antibiotics     discussed with patient daughter at bedside    Discussed with patient  Discussed with RN-send sputum culture        Cammy Singh MD    Supplementary Documentation:     Quality:  DVT Mechanical Prophylaxis:        DVT Pharmacologic Prophylaxis   Medication   None                Code Status: Not on file  Bridges: External urinary catheter in place  Bridges Duration (in days):   Central line:    JARED:     Discharge is dependent on: Clinical progress  At this point Ms. Schwab is expected to be discharge to: Home     The 21st Century Cures Act makes medical notes like these available to patients in the interest of transparency. Please be advised this is a medical document. Medical documents are intended to carry relevant information, facts as evident, and the clinical opinion of the practitioner. The medical note is intended as peer to peer communication and may appear blunt or direct. It is written in medical language and may contain abbreviations or verbiage that are unfamiliar.

## 2025-07-12 NOTE — PLAN OF CARE
Pt is A&O x4. Reports severe pain in throat. Hospitalist paged for additional PRN meds. Spray & lozenges ordered.  Maintaining O2 on 1L NC. FOOTE. CPAP while sleeping. Afib on tele. Denies cardiac symptoms.  Loss of appetite d/t pain. 2 L fluid restriction. Continent of bowel and bladder. DW. I&Os.  Pt updated on plan of care. Bed in lowest position. Bed alarm on. Call light within reach.     Problem: PAIN - ADULT  Goal: Verbalizes/displays adequate comfort level or patient's stated pain goal  Description: INTERVENTIONS:  - Encourage pt to monitor pain and request assistance  - Assess pain using appropriate pain scale  - Administer analgesics based on type and severity of pain and evaluate response  - Implement non-pharmacological measures as appropriate and evaluate response  - Consider cultural and social influences on pain and pain management  - Manage/alleviate anxiety  - Utilize distraction and/or relaxation techniques  - Monitor for opioid side effects  - Notify MD/LIP if interventions unsuccessful or patient reports new pain  - Anticipate increased pain with activity and pre-medicate as appropriate  Outcome: Progressing     Problem: CARDIOVASCULAR - ADULT  Goal: Maintains optimal cardiac output and hemodynamic stability  Description: INTERVENTIONS:  - Monitor vital signs, rhythm, and trends  - Monitor for bleeding, hypotension and signs of decreased cardiac output  - Evaluate effectiveness of vasoactive medications to optimize hemodynamic stability  - Monitor arterial and/or venous puncture sites for bleeding and/or hematoma  - Assess quality of pulses, skin color and temperature  - Assess for signs of decreased coronary artery perfusion - ex. Angina  - Evaluate fluid balance, assess for edema, trend weights  Outcome: Progressing  Goal: Absence of cardiac arrhythmias or at baseline  Description: INTERVENTIONS:  - Continuous cardiac monitoring, monitor vital signs, obtain 12 lead EKG if indicated  - Evaluate  effectiveness of antiarrhythmic and heart rate control medications as ordered  - Initiate emergency measures for life threatening arrhythmias  - Monitor electrolytes and administer replacement therapy as ordered  Outcome: Progressing     Problem: RESPIRATORY - ADULT  Goal: Achieves optimal ventilation and oxygenation  Description: INTERVENTIONS:  - Assess for changes in respiratory status  - Assess for changes in mentation and behavior  - Position to facilitate oxygenation and minimize respiratory effort  - Oxygen supplementation based on oxygen saturation or ABGs  - Provide Smoking Cessation handout, if applicable  - Encourage broncho-pulmonary hygiene including cough, deep breathe, Incentive Spirometry  - Assess the need for suctioning and perform as needed  - Assess and instruct to report SOB or any respiratory difficulty  - Respiratory Therapy support as indicated  - Manage/alleviate anxiety  - Monitor for signs/symptoms of CO2 retention  Outcome: Progressing     Problem: GENITOURINARY - ADULT  Goal: Absence of urinary retention  Description: INTERVENTIONS:  - Assess patient’s ability to void and empty bladder  - Monitor intake/output and perform bladder scan as needed  - Follow urinary retention protocol/standard of care  - Consider collaborating with pharmacy to review patient's medication profile  - Implement strategies to promote bladder emptying  Outcome: Progressing     Problem: METABOLIC/FLUID AND ELECTROLYTES - ADULT  Goal: Electrolytes maintained within normal limits  Description: INTERVENTIONS:  - Monitor labs and rhythm and assess patient for signs and symptoms of electrolyte imbalances  - Administer electrolyte replacement as ordered  - Monitor response to electrolyte replacements, including rhythm and repeat lab results as appropriate  - Fluid restriction as ordered  - Instruct patient on fluid and nutrition restrictions as appropriate  Outcome: Progressing  Goal: Hemodynamic stability and optimal  renal function maintained  Description: INTERVENTIONS:  - Monitor labs and assess for signs and symptoms of volume excess or deficit  - Monitor intake, output and patient weight  - Monitor urine specific gravity, serum osmolarity and serum sodium as indicated or ordered  - Monitor response to interventions for patient's volume status, including labs, urine output, blood pressure (other measures as available)  - Encourage oral intake as appropriate  - Instruct patient on fluid and nutrition restrictions as appropriate  Outcome: Progressing

## 2025-07-12 NOTE — PROGRESS NOTES
Progress Note  Lizzy Schwab Patient Status:  Inpatient    1945 MRN [id number]   Location [facility] 2NE-A Attending Harpal Vo MD   Hosp Day # 3 PCP None Pcp     Subjective:  Denies cardiac symptoms currently.  Reports intermittent throat pain which is slowly getting better, dysphagia also improving.    Objective:  /50 (BP Location: Left arm)   Pulse 103   Temp 98 °F (36.7 °C) (Oral)   Resp (!) 40   Ht 5' 8\" (1.727 m)   Wt 186 lb 15.2 oz (84.8 kg)   LMP  (LMP Unknown)   SpO2 100%   BMI 28.43 kg/m²     Telemetry: A-fib, HR 70s      Intake/Output:    Intake/Output Summary (Last 24 hours) at 2025 0950  Last data filed at 2025 0929  Gross per 24 hour   Intake 1320 ml   Output 100 ml   Net 1220 ml       Last 3 Weights   25 0432 186 lb 15.2 oz (84.8 kg)   25 0604 188 lb 11.4 oz (85.6 kg)   07/10/25 0454 188 lb 4.4 oz (85.4 kg)   25 2304 186 lb 15.9 oz (84.8 kg)   25 1320 187 lb (84.8 kg)   23 1151 185 lb (83.9 kg)   21 0909 181 lb (82.1 kg)       Labs:  Recent Labs   Lab 07/10/25  1810 07/11/25  0636 25  0633   * 117* 116*   BUN 25* 33* 51*   CREATSERUM 2.01* 2.45* 3.23*   EGFRCR 24* 19* 14*   CA 9.9 9.7 9.6    140 140   K 4.4 4.5 4.6    101 102   CO2 33.0* 31.0 27.0     Recent Labs   Lab 07/10/25  0627 07/10/25  1810 07/11/25  0636   RBC 4.71 4.71 4.65   HGB 12.9 12.8 12.6   HCT 41.9 41.9 40.6   MCV 89.0 89.0 87.3   MCH 27.4 27.2 27.1   MCHC 30.8* 30.5* 31.0   RDW 15.0 15.1 15.2   NEPRELIM 17.77* 16.71* 17.98*   WBC 20.0* 18.5* 19.8*   .0 383.0 360.0         No results for input(s): \"TROP\", \"TROPHS\", \"CK\" in the last 168 hours.    Review of Systems   Constitutional: Negative.   HENT:  Positive for sore throat.    Cardiovascular:  Positive for irregular heartbeat.   Respiratory: Negative.         Physical Exam:    Gen: alert, oriented x 3, NAD  Heent: pupils equal, reactive. Mucous membranes moist.   Neck: no  jvd  Cardiac: irregular rate and rhythm, normal S1,S2, no murmur, gallop or rub   Lungs: CTA  Abd: soft, NT/ND +bs  Ext: no edema  Skin: Warm, dry  Neuro: No focal deficits      Medications:    Scheduled Medications[1]  Medication Infusions[2]      Assessment:  Acute on chronic HFpEF, stage C, NYHA 3-suspect secondary to valvular heart disease   CXR showed pulmonary edema   proBNP 5,503  Echo 6/3/25 LVEF 55-60%   S/P RHC 7/9/25 RA 16, PA 62/29, PCWP 29 with V waves CI 2.1  Diuresed with IV lasix, now on oral torsemide-being held with worsening renal function   GDMT: carvedilol, lisinopril   Valvular heart disease: sev MR, TR  S/P RHC 7/9 showed elevated filling pressures s/p IV diuresis   Plan for op structural heart evaluation   Permanent a-fib, rates mod controlled with bb  On warfarin for stroke prevention, INRs therapeutic    HTN-well controlled  YOEL/CKD3-crt trending up at 3.23 today, holding off diuretics, renal following appreciate the recs    Dysphagia-improved, management per GI     Plan:  Continue holding diuretics with worsening renal function, encourage oral intake, will monitor renal function daily prior resuming diuretics, renal following, appreciate the recs.  Continue carvedilol, will hold lisinopril with YOEL and soft Bps.  Continue sildenafil.  Continue warfarin-per pharmacy.   Continue abx per primary.       Plan of care discussed with patient, RN.    ELIAS Schmid  7/12/2025  9:50 AM  [phone]       Physician addendum:   I have discussed with APN, agree with note, MDM per me    Acute on chronic HFpEF  Valvular heart disease with severe MR and TR  Permanent A-fib  Hypertension  YOEL on CKD    Plan:  Continue holding diuretics with worsening renal function courage oral intake will monitor closely  Continue carvedilol hold lisinopril with YOEL  Continue sildenafil  Continue Coumadin    L2    Hay Aguirre MD  [facility]       [1]    [Held by provider] torsemide  20 mg Oral BID (Diuretic)     sildenafil  10 mg Oral TID    carvedilol  12.5 mg Oral BID with meals    acidophilus  1 each Oral Daily    sucralfate  1 g Oral TID AC and HS (2200)    doxycycline  100 mg Intravenous Q12H    cefTRIAXone  2 g Intravenous Q24H    digoxin  125 mcg Oral Daily    lisinopril  10 mg Oral Daily   [2]

## 2025-07-12 NOTE — PROGRESS NOTES
[facility] Dosing Service  Warfarin (Coumadin) Initial Dosing    Lizzy Schwab is a 79 year old patient for whom pharmacy has been consulted to dose warfarin (COUMADIN) for Prevention of systemic embolism by ELIAS Schmid.  Based on this indication, goal INR is 2-3.    Pertinent Patient Medical History:  Afib  Potential Drug Interactions:  Ceftriaxone, Doxycycline, Torsemide    Latest INR:   Recent Labs     07/12/25  1552   INR 3.60*       Other Anticoagulants:  none  Home regimen (if applicable):  2 mg Tues/Thurs, 4 mg ROW   Date/Time last dose was given (if applicable): 2 mg on 7/10 @ 2100    Based on above -  1.  For today, Hold warfarin (COUMADIN) dose    2.  PT/INR ordered daily while on warfarin    3.  Pharmacy will continue to follow.  We appreciate the opportunity to assist in the care of this patient.    Fawn Goins, PharmD, [facility]  7/12/2025  5:15 PM

## 2025-07-12 NOTE — PLAN OF CARE
Pt alert and oriented x 4.  Continuous tele monitoring in place.  Afib with PVCs on the monitor.  Reports right neck pain.  Declined prn medication.  Edema improved.  Carafate added per GI for painful swallowing. Dyspnea with activity.  Denies dizziness.  O2 at 2L per NC as needed.  Repeat chest xr completed.  Multiple urgent bm in am. Improved in afternoon.  GI stool panel pending. Cdiff negative.  Probiotic added.  IV abx continued x2.  Bps soft.  Coreg and sildenafil dosing adjusted per cardiology.  Low urine output.  Renal made aware.  Per renal okay for PO torsemide despite elevated creatinine.  PT saw pt and is not recommending home health.  Poor appetite.  Encouraged po intake as tolerating.  Safety and comfort maintained.  Will continue to monitor.      Problem: PAIN - ADULT  Goal: Verbalizes/displays adequate comfort level or patient's stated pain goal  Description: INTERVENTIONS:  - Encourage pt to monitor pain and request assistance  - Assess pain using appropriate pain scale  - Administer analgesics based on type and severity of pain and evaluate response  - Implement non-pharmacological measures as appropriate and evaluate response  - Consider cultural and social influences on pain and pain management  - Manage/alleviate anxiety  - Utilize distraction and/or relaxation techniques  - Monitor for opioid side effects  - Notify MD/LIP if interventions unsuccessful or patient reports new pain  - Anticipate increased pain with activity and pre-medicate as appropriate  7/11/2025 1942 by Shakira Haynes, RN  Outcome: Progressing  7/11/2025 1322 by Shakira Haynes, RN  Outcome: Progressing     Problem: CARDIOVASCULAR - ADULT  Goal: Maintains optimal cardiac output and hemodynamic stability  Description: INTERVENTIONS:  - Monitor vital signs, rhythm, and trends  - Monitor for bleeding, hypotension and signs of decreased cardiac output  - Evaluate effectiveness of vasoactive medications to optimize hemodynamic  stability  - Monitor arterial and/or venous puncture sites for bleeding and/or hematoma  - Assess quality of pulses, skin color and temperature  - Assess for signs of decreased coronary artery perfusion - ex. Angina  - Evaluate fluid balance, assess for edema, trend weights  7/11/2025 1942 by Shakira Haynes RN  Outcome: Progressing  7/11/2025 1322 by Shakira Haynes RN  Outcome: Progressing  Goal: Absence of cardiac arrhythmias or at baseline  Description: INTERVENTIONS:  - Continuous cardiac monitoring, monitor vital signs, obtain 12 lead EKG if indicated  - Evaluate effectiveness of antiarrhythmic and heart rate control medications as ordered  - Initiate emergency measures for life threatening arrhythmias  - Monitor electrolytes and administer replacement therapy as ordered  7/11/2025 1942 by Shakira Haynes RN  Outcome: Progressing  7/11/2025 1322 by Shakira Haynes RN  Outcome: Progressing     Problem: RESPIRATORY - ADULT  Goal: Achieves optimal ventilation and oxygenation  Description: INTERVENTIONS:  - Assess for changes in respiratory status  - Assess for changes in mentation and behavior  - Position to facilitate oxygenation and minimize respiratory effort  - Oxygen supplementation based on oxygen saturation or ABGs  - Provide Smoking Cessation handout, if applicable  - Encourage broncho-pulmonary hygiene including cough, deep breathe, Incentive Spirometry  - Assess the need for suctioning and perform as needed  - Assess and instruct to report SOB or any respiratory difficulty  - Respiratory Therapy support as indicated  - Manage/alleviate anxiety  - Monitor for signs/symptoms of CO2 retention  7/11/2025 1942 by Shakira Haynes RN  Outcome: Progressing  7/11/2025 1322 by Shakira Haynes RN  Outcome: Progressing     Problem: GENITOURINARY - ADULT  Goal: Absence of urinary retention  Description: INTERVENTIONS:  - Assess patient’s ability to void and empty bladder  - Monitor intake/output  and perform bladder scan as needed  - Follow urinary retention protocol/standard of care  - Consider collaborating with pharmacy to review patient's medication profile  - Implement strategies to promote bladder emptying  7/11/2025 1942 by Shakira Haynes RN  Outcome: Progressing  7/11/2025 1322 by Shakira Haynes RN  Outcome: Progressing     Problem: METABOLIC/FLUID AND ELECTROLYTES - ADULT  Goal: Electrolytes maintained within normal limits  Description: INTERVENTIONS:  - Monitor labs and rhythm and assess patient for signs and symptoms of electrolyte imbalances  - Administer electrolyte replacement as ordered  - Monitor response to electrolyte replacements, including rhythm and repeat lab results as appropriate  - Fluid restriction as ordered  - Instruct patient on fluid and nutrition restrictions as appropriate  7/11/2025 1942 by Shakira Haynes RN  Outcome: Progressing  7/11/2025 1322 by Shakira Haynes RN  Outcome: Progressing  Goal: Hemodynamic stability and optimal renal function maintained  Description: INTERVENTIONS:  - Monitor labs and assess for signs and symptoms of volume excess or deficit  - Monitor intake, output and patient weight  - Monitor urine specific gravity, serum osmolarity and serum sodium as indicated or ordered  - Monitor response to interventions for patient's volume status, including labs, urine output, blood pressure (other measures as available)  - Encourage oral intake as appropriate  - Instruct patient on fluid and nutrition restrictions as appropriate  7/11/2025 1942 by Shakira Haynes RN  Outcome: Progressing  7/11/2025 1322 by Shakira Haynes RN  Outcome: Progressing

## 2025-07-13 PROBLEM — J18.9 PNEUMONIA OF RIGHT LUNG DUE TO INFECTIOUS ORGANISM: Status: ACTIVE | Noted: 2025-07-13

## 2025-07-13 PROBLEM — N17.0 ACUTE TUBULAR NECROSIS: Status: ACTIVE | Noted: 2025-07-13

## 2025-07-13 LAB
ANION GAP SERPL CALC-SCNC: 7 MMOL/L (ref 0–18)
BASOPHILS # BLD AUTO: 0.02 X10(3) UL (ref 0–0.2)
BASOPHILS NFR BLD AUTO: 0.1 %
BUN BLD-MCNC: 73 MG/DL (ref 9–23)
CALCIUM BLD-MCNC: 9.6 MG/DL (ref 8.7–10.6)
CHLORIDE SERPL-SCNC: 99 MMOL/L (ref 98–112)
CO2 SERPL-SCNC: 34 MMOL/L (ref 21–32)
CREAT BLD-MCNC: 3.44 MG/DL (ref 0.55–1.02)
EGFRCR SERPLBLD CKD-EPI 2021: 13 ML/MIN/1.73M2 (ref 60–?)
EOSINOPHIL # BLD AUTO: 0 X10(3) UL (ref 0–0.7)
EOSINOPHIL NFR BLD AUTO: 0 %
ERYTHROCYTE [DISTWIDTH] IN BLOOD BY AUTOMATED COUNT: 15 %
GLUCOSE BLD-MCNC: 123 MG/DL (ref 70–99)
HCT VFR BLD AUTO: 36.7 % (ref 35–48)
HGB BLD-MCNC: 11.9 G/DL (ref 12–16)
IMM GRANULOCYTES # BLD AUTO: 0.18 X10(3) UL (ref 0–1)
IMM GRANULOCYTES NFR BLD: 1 %
INR BLD: 3.58 (ref 0.8–1.2)
LYMPHOCYTES # BLD AUTO: 0.51 X10(3) UL (ref 1–4)
LYMPHOCYTES NFR BLD AUTO: 2.9 %
MCH RBC QN AUTO: 27.2 PG (ref 26–34)
MCHC RBC AUTO-ENTMCNC: 32.4 G/DL (ref 31–37)
MCV RBC AUTO: 83.8 FL (ref 80–100)
MONOCYTES # BLD AUTO: 1.3 X10(3) UL (ref 0.1–1)
MONOCYTES NFR BLD AUTO: 7.5 %
NEUTROPHILS # BLD AUTO: 15.29 X10 (3) UL (ref 1.5–7.7)
NEUTROPHILS # BLD AUTO: 15.29 X10(3) UL (ref 1.5–7.7)
NEUTROPHILS NFR BLD AUTO: 88.5 %
OSMOLALITY SERPL CALC.SUM OF ELEC: 313 MOSM/KG (ref 275–295)
PLATELET # BLD AUTO: 385 10(3)UL (ref 150–450)
POTASSIUM SERPL-SCNC: 4.2 MMOL/L (ref 3.5–5.1)
PROTHROMBIN TIME: 36 SECONDS (ref 11.6–14.8)
RBC # BLD AUTO: 4.38 X10(6)UL (ref 3.8–5.3)
SODIUM SERPL-SCNC: 140 MMOL/L (ref 136–145)
WBC # BLD AUTO: 17.3 X10(3) UL (ref 4–11)

## 2025-07-13 PROCEDURE — 99223 1ST HOSP IP/OBS HIGH 75: CPT | Performed by: INTERNAL MEDICINE

## 2025-07-13 PROCEDURE — 99233 SBSQ HOSP IP/OBS HIGH 50: CPT | Performed by: INTERNAL MEDICINE

## 2025-07-13 PROCEDURE — 99232 SBSQ HOSP IP/OBS MODERATE 35: CPT | Performed by: INTERNAL MEDICINE

## 2025-07-13 RX ORDER — GUAIFENESIN 600 MG/1
600 TABLET, EXTENDED RELEASE ORAL 2 TIMES DAILY
Status: DISCONTINUED | OUTPATIENT
Start: 2025-07-13 | End: 2025-07-15

## 2025-07-13 RX ADMIN — CARVEDILOL 12.5 MG: 12.5 TABLET ORAL at 08:29:00

## 2025-07-13 RX ADMIN — SUCRALFATE ORAL 1 G: 1 SUSPENSION ORAL at 22:04:00

## 2025-07-13 RX ADMIN — SUCRALFATE ORAL 1 G: 1 SUSPENSION ORAL at 06:00:00

## 2025-07-13 RX ADMIN — SILDENAFIL CITRATE 10 MG: 20 TABLET ORAL at 21:55:00

## 2025-07-13 RX ADMIN — SUCRALFATE ORAL 1 G: 1 SUSPENSION ORAL at 17:08:00

## 2025-07-13 RX ADMIN — GUAIFENESIN 600 MG: 600 TABLET, EXTENDED RELEASE ORAL at 08:30:00

## 2025-07-13 RX ADMIN — SUCRALFATE ORAL 1 G: 1 SUSPENSION ORAL at 12:47:00

## 2025-07-13 RX ADMIN — SILDENAFIL CITRATE 10 MG: 20 TABLET ORAL at 17:08:00

## 2025-07-13 RX ADMIN — CARVEDILOL 12.5 MG: 12.5 TABLET ORAL at 17:08:00

## 2025-07-13 RX ADMIN — GUAIFENESIN 600 MG: 600 TABLET, EXTENDED RELEASE ORAL at 21:50:00

## 2025-07-13 RX ADMIN — SILDENAFIL CITRATE 10 MG: 20 TABLET ORAL at 08:24:00

## 2025-07-13 RX ADMIN — LACTOBACILLUS ACIDOPHILUS 1 EACH: 500MM CELL CAPSULE ORAL at 08:23:00

## 2025-07-13 RX ADMIN — DIGOXIN 125 MCG: 125 MCG TABLET ORAL at 08:29:00

## 2025-07-13 NOTE — PROGRESS NOTES
Counts include 234 beds at the [facility] Dosing Service  Warfarin (Coumadin) Subsequent Dosing    Lizzy Schwab is a 79 year old patient for whom pharmacy is dosing warfarin (Coumadin). Goal INR is 2-3    Recent Labs   Lab 07/10/25  0627 07/11/25  0636 07/12/25  1552 07/13/25  0747   INR 2.00* 3.09* 3.60* 3.58*       Consulted by:  ELIAS Schmid  Indication:  Afib  Potential Drug Interactions:  ceftriaxone, doxycycline, torsemide  Other Anticoagulants:  none  Home regimen (if applicable):  2 mg Tues/Thurs, 4 mg ROW     Inpatient Dosing History:    Date 7/12 7/13       INR 3.6 3.58       Coumadin dose held                 Based on above -  1.  For today, Hold warfarin (COUMADIN) dose  2   PT/INR ordered daily while on warfarin  3.  Pharmacy will continue to follow.  We appreciate the opportunity to assist in the care of this patient.    Luz Maria Go, PharmD  7/13/2025  9:35 AM

## 2025-07-13 NOTE — PROGRESS NOTES
Progress Note  Lizzy Schwab Patient Status:  Inpatient    1945 MRN [id number]   [facility] 2NE-A Attending Harpal Vo MD   Hosp Day # 4 PCP None Pcp     Subjective:  No acute events overnight.  Denies any cardiac symptoms currently, throat soreness is improving.    Objective:  /65   Pulse 85   Temp 97.8 °F (36.6 °C) (Oral)   Resp 26   Ht 5' 8\" (1.727 m)   Wt 188 lb 11.4 oz (85.6 kg)   LMP  (LMP Unknown)   SpO2 98%   BMI 28.69 kg/m²     Telemetry: A-fib, HR 80s      Intake/Output:    Intake/Output Summary (Last 24 hours) at 2025 1005  Last data filed at 2025 0840  Gross per 24 hour   Intake 1620 ml   Output 200 ml   Net 1420 ml       Last 3 Weights   25 0442 188 lb 11.4 oz (85.6 kg)   25 0432 186 lb 15.2 oz (84.8 kg)   25 0604 188 lb 11.4 oz (85.6 kg)   07/10/25 0454 188 lb 4.4 oz (85.4 kg)   25 2304 186 lb 15.9 oz (84.8 kg)   25 1320 187 lb (84.8 kg)   23 1151 185 lb (83.9 kg)   21 0909 181 lb (82.1 kg)       Labs:  Recent Labs   Lab 25  0636 25  0633 25  0747   * 116* 123*   BUN 33* 51* 73*   CREATSERUM 2.45* 3.23* 3.44*   EGFRCR 19* 14* 13*   CA 9.7 9.6 9.6    140 140   K 4.5 4.6 4.2    102 99   CO2 31.0 27.0 34.0*     Recent Labs   Lab 25  0636 25  1552 25  0747   RBC 4.65 4.45 4.38   HGB 12.6 12.3 11.9*   HCT 40.6 38.8 36.7   MCV 87.3 87.2 83.8   MCH 27.1 27.6 27.2   MCHC 31.0 31.7 32.4   RDW 15.2 15.3 15.0   NEPRELIM 17.98* 17.09* 15.29*   WBC 19.8* 18.9* 17.3*   .0 403.0 385.0         No results for input(s): \"TROP\", \"TROPHS\", \"CK\" in the last 168 hours.    Review of Systems   Constitutional: Negative.   HENT:  Positive for sore throat.    Cardiovascular:  Positive for irregular heartbeat.   Respiratory: Negative.         Physical Exam:    Gen: alert, oriented x 3, NAD  Heent: pupils equal, reactive. Mucous membranes moist.   Neck: no jvd  Cardiac: regular  rate and rhythm, normal S1,S2, no murmur, gallop or rub   Lungs: CTA  Abd: soft, NT/ND +bs  Ext: no edema  Skin: Warm, dry  Neuro: No focal deficits        Medications:    Scheduled Medications[1]  Medication Infusions[2]    Assessment:  Acute on chronic HFpEF, stage C, NYHA 3-suspect secondary to valvular heart disease   CXR showed pulmonary edema   proBNP 5,503  Echo 6/3/25 LVEF 55-60%   S/P RHC 7/9/25 RA 16, PA 62/29, PCWP 29 with V waves CI 2.1  Diuresed with IV lasix, now on oral torsemide-being held with worsening renal function   GDMT: carvedilol, lisinopril   Valvular heart disease: sev MR, TR  S/P RHC 7/9 showed elevated filling pressures s/p IV diuresis   Plan for op structural heart evaluation   Permanent a-fib, rates mod controlled with bb  On warfarin for stroke prevention, INRs therapeutic    HTN-well controlled  YOEL/CKD3-crt trending up at 3. 44 today, holding off diuretics, renal following appreciate the recs    Dysphagia-improved, management per GI     Plan:  Continue holding diuretics with YOEL-renal following, appreciate the recs.  Continue carvedilol, sildenafil.  Holding lisinopril with YOEL.  Continue warfarin -pharmacy managing.  Continue abx per primary.   Plan for op structural heart eval for sev MR,TR.     Plan of care discussed with patient, RN.    Aleena Aparicio, ELIAS  7/13/2025  10:05 AM  [phone]        Physician addendum:  I have discussed with APN, medical decision making per myself  Agree with note as above    Acute on chronic HFpEF-status post diuresis now euvolemic  CKD complicated by YOEL with an uptrending creatinine secondary to suspected overdiuresis possible ATN in the setting of procedures and labile blood pressures  Permanent A-fib  Valvular heart disease severe MR and TR    Plan:  Continue holding diuretics per nephrology  Continue carvedilol and sildenafil  Holding lisinopril in setting of YOEL  Continue Coumadin    L2    Hay Aguirre MD  [facility]   [facility]       [1]     guaiFENesin ER  600 mg Oral BID    [Held by provider] torsemide  20 mg Oral BID (Diuretic)    sildenafil  10 mg Oral TID    carvedilol  12.5 mg Oral BID with meals    acidophilus  1 each Oral Daily    sucralfate  1 g Oral TID AC and HS (2200)    doxycycline  100 mg Intravenous Q12H    cefTRIAXone  2 g Intravenous Q24H    digoxin  125 mcg Oral Daily    [Held by provider] lisinopril  10 mg Oral Daily   [2]

## 2025-07-13 NOTE — PLAN OF CARE
Pt a/o x4. Denies pain. Maintaining spo2 saturations on 2L NC. Controlled afib observed to bedside monitor. Pt c/o dyspnea and mucous. Orders obtained for scheduled mucinex and flutter valve. CT chest tomorrow AM. Voiding. Up to bathroom with standby assist. Plan of care discussed.

## 2025-07-13 NOTE — PLAN OF CARE
Assumed patient care approximately 1930. Patient is alert and oriented X4, family present during rounds. SpO2 maintained on 1L NC with saturation maintaining greater than 89%, RA is patient's Baseline, Patient politely & respectfully refusing CPAP patient placed on 2L while sleeping.. A-Fib on tele, HR sustaining below 100 . Continent of bladder and bowel. No pain reported aside from discomfort to throat from past procedure. Ambulates with standby assist and a walker. Education provided on CPAP, Medication, Tele, Call light, patient and family verbalize understanding. Difficulty obtaining accurate I&O's due to patient soaking brief// missing hat     Plan of care: Tele, SpO2, O2, IV Abx, Labs, Wean O2, DW, I&Os        Problem: PAIN - ADULT  Goal: Verbalizes/displays adequate comfort level or patient's stated pain goal  Description: INTERVENTIONS:  - Encourage pt to monitor pain and request assistance  - Assess pain using appropriate pain scale  - Administer analgesics based on type and severity of pain and evaluate response  - Implement non-pharmacological measures as appropriate and evaluate response  - Consider cultural and social influences on pain and pain management  - Manage/alleviate anxiety  - Utilize distraction and/or relaxation techniques  - Monitor for opioid side effects  - Notify MD/LIP if interventions unsuccessful or patient reports new pain  - Anticipate increased pain with activity and pre-medicate as appropriate  Outcome: Progressing     Problem: CARDIOVASCULAR - ADULT  Goal: Maintains optimal cardiac output and hemodynamic stability  Description: INTERVENTIONS:  - Monitor vital signs, rhythm, and trends  - Monitor for bleeding, hypotension and signs of decreased cardiac output  - Evaluate effectiveness of vasoactive medications to optimize hemodynamic stability  - Monitor arterial and/or venous puncture sites for bleeding and/or hematoma  - Assess quality of pulses, skin color and temperature  -  Assess for signs of decreased coronary artery perfusion - ex. Angina  - Evaluate fluid balance, assess for edema, trend weights  Outcome: Progressing  Goal: Absence of cardiac arrhythmias or at baseline  Description: INTERVENTIONS:  - Continuous cardiac monitoring, monitor vital signs, obtain 12 lead EKG if indicated  - Evaluate effectiveness of antiarrhythmic and heart rate control medications as ordered  - Initiate emergency measures for life threatening arrhythmias  - Monitor electrolytes and administer replacement therapy as ordered  Outcome: Progressing     Problem: RESPIRATORY - ADULT  Goal: Achieves optimal ventilation and oxygenation  Description: INTERVENTIONS:  - Assess for changes in respiratory status  - Assess for changes in mentation and behavior  - Position to facilitate oxygenation and minimize respiratory effort  - Oxygen supplementation based on oxygen saturation or ABGs  - Provide Smoking Cessation handout, if applicable  - Encourage broncho-pulmonary hygiene including cough, deep breathe, Incentive Spirometry  - Assess the need for suctioning and perform as needed  - Assess and instruct to report SOB or any respiratory difficulty  - Respiratory Therapy support as indicated  - Manage/alleviate anxiety  - Monitor for signs/symptoms of CO2 retention  Outcome: Progressing     Problem: GENITOURINARY - ADULT  Goal: Absence of urinary retention  Description: INTERVENTIONS:  - Assess patient’s ability to void and empty bladder  - Monitor intake/output and perform bladder scan as needed  - Follow urinary retention protocol/standard of care  - Consider collaborating with pharmacy to review patient's medication profile  - Implement strategies to promote bladder emptying  Outcome: Progressing     Problem: METABOLIC/FLUID AND ELECTROLYTES - ADULT  Goal: Electrolytes maintained within normal limits  Description: INTERVENTIONS:  - Monitor labs and rhythm and assess patient for signs and symptoms of electrolyte  imbalances  - Administer electrolyte replacement as ordered  - Monitor response to electrolyte replacements, including rhythm and repeat lab results as appropriate  - Fluid restriction as ordered  - Instruct patient on fluid and nutrition restrictions as appropriate  Outcome: Progressing  Goal: Hemodynamic stability and optimal renal function maintained  Description: INTERVENTIONS:  - Monitor labs and assess for signs and symptoms of volume excess or deficit  - Monitor intake, output and patient weight  - Monitor urine specific gravity, serum osmolarity and serum sodium as indicated or ordered  - Monitor response to interventions for patient's volume status, including labs, urine output, blood pressure (other measures as available)  - Encourage oral intake as appropriate  - Instruct patient on fluid and nutrition restrictions as appropriate  Outcome: Progressing

## 2025-07-13 NOTE — PROGRESS NOTES
[facility]  Nephrology Progress Note    Lizzy Schwab Patient Status:  Inpatient    1945 MRN [id number]   [facility] 2NE-A Attending Harpal Vo MD   Hosp Day # 4 PCP None Pcp     Subjective:  Did not sleep well overnight. Daughter reports that since yesterday afternoon, urine has been more increased slightly    Objective:  Vital signs: Blood pressure 105/50, pulse 85, temperature 97.8 °F (36.6 °C), temperature source Oral, resp. rate (!) 27, height 5' 8\" (1.727 m), weight 188 lb 11.4 oz (85.6 kg), SpO2 97%.  General: No acute distress. Alert and oriented x 3.  HEENT: Moist mucous membranes.   Respiratory: CTA  Cardiovascular: S1, S2.  Regular rate and rhythm.    Abdomen: Soft, nontender, nondistended.   Neurologic: No focal neurological deficits.  Musculoskeletal:  No swelling noted.    Current Hospital Medications[1]      Recent Labs   Lab 07/10/25  0627 07/10/25  1810 07/11/25  0636 25  1552 25  0747   WBC 20.0* 18.5* 19.8* 18.9* 17.3*   HGB 12.9 12.8 12.6 12.3 11.9*   MCV 89.0 89.0 87.3 87.2 83.8   .0 383.0 360.0 403.0 385.0   INR 2.00*  --  3.09* 3.60* 3.58*       Recent Labs   Lab 07/10/25  0627 07/10/25  1810 07/11/25  0636 25  0633 25  0747    138 140 140 140   K 4.6 4.4 4.5 4.6 4.2   CL 99 102 101 102 99   CO2 36.0* 33.0* 31.0 27.0 34.0*   BUN 26* 25* 33* 51* 73*   CREATSERUM 1.77* 2.01* 2.45* 3.23* 3.44*   CA 9.7 9.9 9.7 9.6 9.6   * 121* 117* 116* 123*       Recent Labs   Lab 07/09/25  1958 07/10/25  1810   ALT 10 10   AST 27 32   ALB 4.5 4.7       No results for input(s): \"PGLU\" in the last 168 hours.      Assessment/Plan:  1) Acute kidney injury on CKD3b: Baseline serum creatinine 1.3-1.5 mg/dL, likely in setting of long-standing HTN and heart failure. Current acute kidney injury appears consistent with ATN related to possible BP fluctuations during procedures on  as well as low Bps noted during admission. Continue to hold  torsemide today and encourage PO intake. Renal function this morning is slightly higher than yesterday but appears to be plateauing. Follow urine output and renal function daily.     2) Acute on chronic HFpEF: due to valvular heart disease. Continues to appear compensated on exam today. On GDMT per cardiology. Hold diuretics today     3) Dysphagia: GI following, she reports symptoms continue to improve this AM.     4) Afib: on coumadin    Discussed with patient and daughter at bedside.     Thank you for allowing me to participate in this patient's care. Please feel free to call me with any questions or concerns.     Georgette Chaves MD  07/13/25       [1]   Current Facility-Administered Medications   Medication Dose Route Frequency    guaiFENesin ER (Mucinex) 12 hr tab 600 mg  600 mg Oral BID    guaiFENesin (Robitussin) 100 MG/5 ML oral liquid 100 mg  100 mg Oral Q4H PRN    [Held by provider] torsemide (Demadex) tab 20 mg  20 mg Oral BID (Diuretic)    sildenafil (Revatio) tab 10 mg  10 mg Oral TID    carvedilol (Coreg) tab 12.5 mg  12.5 mg Oral BID with meals    acidophilus (Probiotic) cap/tab 1 each  1 each Oral Daily    sucralfate (Carafate) 1 GM/10ML oral suspension 1 g  1 g Oral TID AC and HS (2200)    phenol (Chloraseptic) 1.4 % oral liquid spray   Oral Q2H PRN    benzocaine-menthol (Cepacol) lozenge 1 lozenge  1 lozenge Oral PRN    doxycycline (Vibramycin) 100 mg in sodium chloride 0.9% 100mL IVPB-Real  100 mg Intravenous Q12H    cefTRIAXone (Rocephin) 2 g in sodium chloride 0.9% 100mL IVPB-REAL  2 g Intravenous Q24H    digoxin (Lanoxin) tab 125 mcg  125 mcg Oral Daily    [Held by provider] lisinopril (Zestril) tab 10 mg  10 mg Oral Daily    traMADol (Ultram) tab 50 mg  50 mg Oral Q6H PRN    acetaminophen (Tylenol) tab 650 mg  650 mg Oral Q6H PRN     Facility-Administered Medications Ordered in Other Encounters   Medication Dose Route Frequency    acetaminophen (Tylenol) tab 650 mg  650 mg Oral Q6H PRN

## 2025-07-13 NOTE — PROGRESS NOTES
[facility]   part of [facility]     Hospitalist Progress Note     Lizzy Schwab Patient Status:  Inpatient    1945 MRN [id number]   Location [facility] 2NE-A Attending Harpal Vo MD   Hosp Day # 4 PCP None Pcp     Chief Complaint: Medical management    Subjective:     Patient seen with RN at bedside.  Neck swelling and sore throat improving.  Bringing up a lot of phlegm today  Remains on 2 L oxygen by nasal cannula.  Afebrile today.    Objective:    Review of Systems:   A comprehensive review of systems was completed; pertinent positive and negatives stated in subjective.    Vital signs:  Temp:  [97.5 °F (36.4 °C)-98.5 °F (36.9 °C)] 97.8 °F (36.6 °C)  Pulse:  [69-96] 84  Resp:  [17-35] 35  BP: (103-122)/(50-65) 118/57  SpO2:  [93 %-98 %] 98 %    Physical Exam:    /57 (BP Location: Right arm)   Pulse 84   Temp 97.8 °F (36.6 °C) (Oral)   Resp (!) 35   Ht 5' 8\" (1.727 m)   Wt 188 lb 11.4 oz (85.6 kg)   LMP  (LMP Unknown)   SpO2 98%   BMI 28.69 kg/m²     General: No acute distress.   HEENT: Moist mucous membranes.  Tender anterior cervical lymphadenopathy, pharyngeal erythema improving significantly  Neck: Right-sided neck right heart cath access site in dressing and dressing looks dry.  Respiratory: Clear to auscultation bilaterally.  No wheezes. No rhonchi.  Cardiovascular: S1, S2.  Regular rate and rhythm.    Abdomen: Soft, nontender, nondistended.  Positive bowel sounds.   Neurologic: Awake alert oriented, no focal neurological deficits.  Musculoskeletal: Full range of motion of all extremities.  No pedal edema or calf tenderness  Psychiatric: Appropriate mood and affect.       Diagnostic Data:    Labs:  Recent Labs   Lab 07/10/25  0627 07/10/25  1810 25  0636 25  1552 25  0747   WBC 20.0* 18.5* 19.8* 18.9* 17.3*   HGB 12.9 12.8 12.6 12.3 11.9*   MCV 89.0 89.0 87.3 87.2 83.8   .0 383.0 360.0 403.0 385.0   INR 2.00*  --  3.09* 3.60* 3.58*       Recent  Labs   Lab 07/09/25 1958 07/10/25  0627 07/10/25  1810 07/11/25  0636 07/12/25  0633 07/13/25  0747   *   < > 121* 117* 116* 123*   BUN 21   < > 25* 33* 51* 73*   CREATSERUM 1.47*   < > 2.01* 2.45* 3.23* 3.44*   CA 9.7   < > 9.9 9.7 9.6 9.6   ALB 4.5  --  4.7  --   --   --       < > 138 140 140 140   K 4.6   < > 4.4 4.5 4.6 4.2      < > 102 101 102 99   CO2 34.0*   < > 33.0* 31.0 27.0 34.0*   ALKPHO 80  --  70  --   --   --    AST 27  --  32  --   --   --    ALT 10  --  10  --   --   --    BILT 1.0  --  0.6  --   --   --    TP 8.0  --  8.4*  --   --   --     < > = values in this interval not displayed.       Estimated Glomerular Filtration Rate: 13 mL/min/1.73m2 (A) (result from lab).    No results for input(s): \"TROP\", \"TROPHS\", \"CK\" in the last 168 hours.    Recent Labs   Lab 07/11/25  0636 07/12/25  1552 07/13/25  0747   PTP 32.1* 36.2* 36.0*   INR 3.09* 3.60* 3.58*                  Microbiology    Hospital Encounter on 07/09/25   1. Blood Culture     Status: None (Preliminary result)    Collection Time: 07/10/25  9:05 AM    Specimen: Blood,peripheral   Result Value Ref Range    Blood Culture Result No Growth 2 Days N/A         Imaging: Reviewed in Epic.  Chest x-ray done on 7/9/2025 night results reviewed as below  XR CHEST AP PORTABLE  (CPT=71045)    INDICATIONS: chf    COMPARISON: 4/13/2021    FINDINGS:  Cardiomegaly with mild pulmonary vascular congestion, increased interstitial markings the lungs, patchy groundglass opacity in the lower lungs is concerning for congestive failure with pulmonary edema. There is patchy consolidation in the right lower  lobe with superimposed pneumonia not excluded. Clinical correlation recommended. Trace bilateral pleural effusions. No pneumothorax. Degenerative change in the spine.    Impression   CONCLUSION:    Cardiomegaly with mild pulmonary vascular congestion, increased interstitial markings the lungs, patchy groundglass opacity in the lower lungs is  concerning for congestive failure with pulmonary edema. There is patchy consolidation in the right lower  lobe with superimposed pneumonia not excluded. Clinical correlation recommended. Trace bilateral pleural effusions.      Addendum-x-ray esophagram ordered by GI done on 7/10/2025 resulted as below  XR UGI/ESOPHAGUS DOUBLE CONTRAST (CPT=74246)    INDICATIONS: Throat pain    PATIENT STATED HISTORY: Patient states she had a procedure done yesterday where they inserted a camera down her throat and it failed. Shares she now has neck/throat pain.    COMPARISON: There are no comparisons for this exam.    TECHNIQUE: Single contrast upper GI and esophagram was performed with water-soluble contrast.    Fluoro/Cine Image Number: [id number]  Fluoro Time: 2 minutes 25 seconds  Radiation Dose: Ugy/m2(m squared)  Technologist Time: 45 minutes    FINDINGS:    No abnormality is seen in the pharynx and cervical esophagus. There is diminished primary peristalsis of the thoracic esophagus at the thoracic esophagus is displaced posteriorly below the tobias, consistent with extrinsic displacement by an enlarged  left atrium. No esophageal stricture is identified. The mucosal outline of the stomach and duodenum appear normal. There is limited distention of the stomach due to small quantity of ingested material. No hiatal hernia or GE reflux was observed  fluoroscopically.      Impression   CONCLUSION:    Posterior and leftward displacement of mid and distal thoracic esophagus secondary to enlarged left atrium. Diminished primary esophageal peristalsis. No esophageal stricture.     Medications:    guaiFENesin ER  600 mg Oral BID    [Held by provider] torsemide  20 mg Oral BID (Diuretic)    sildenafil  10 mg Oral TID    carvedilol  12.5 mg Oral BID with meals    acidophilus  1 each Oral Daily    sucralfate  1 g Oral TID AC and HS (2200)    doxycycline  100 mg Intravenous Q12H    cefTRIAXone  2 g Intravenous Q24H    digoxin  125 mcg Oral Daily     [Held by provider] lisinopril  10 mg Oral Daily       Assessment & Plan:    Admitted for observation by cardiology status post cardiac angiogram right heart cath And status post attempted SRAVANTHI-managed by cardiology     #Transient dysphagia and sore throat possibly due to acute pharyngitis with anterior cervical tender lymphadenopathy  #Right lower lobe pneumonia seen on chest x-ray  #Leukocytosis due to above  Continue on IV antibiotics  Patient has no crepitus on exam.  No pharyngeal erythema.  Denies any heartburns.  White count increasing today to 20 with elevated neutrophil count also.  Continue IV antibiotics and follow CBC in a.m.  Procalcitonin elevated to 11.21 on admission  Blood culture and throat culture and sputum culture ordered  Respiratory panel flu panel negative  Pulmonary consult  #Hypertension  Follow-up blood pressure  Patient on Coreg at home which we will plan to continue  #Atrial fibrillation  Monitor on telemetry  On chart review patient on Coreg, digoxin  Hold home warfarin since 7/11/2025 due to supratherapeutic INR, follow INR daily  #Acute on chronic preserved ejection fraction CHF, chronic valvular heart disease with severe mitral regurgitation and tricuspid regurgitation  Cardiology following  Cardiac medications per cardiology.  On chart review patient on Coreg, lisinopril, digoxin  Chest x-ray done on 7/9/2025 with cardiomegaly with mild pulmonary vascular congestion and increased interstitial opacity in lower lungs concerning for CHF with pulmonary edema.  Also with patchy consolidation right lower lobe with superimposed pneumonia not excluded.  Status post IV Bumex per cardiology given on 7/9/2025  Status post IV Lasix 80 mg IV daily started by cardiology on 7/9/2025 which was held from 7/11/2025.  Last dose of IV Lasix was on evening of 7/10/2025.  Patient received torsemide on 7/11/2025, which was held on 7/12/2025 as creatinine increasing.  Daily weight  I/O chart  CHF  protocol  # Acute kidney injury on CKD stage III  this was discussed with patient and patient's daughter at bedside and looked at old labs in Care Everywhere from 2021 and 2024 and reviewed this with patient and daughter  Creatinine increasing, nephrology on consult with holding all diuretics and lisinopril at this time.  Follow BMP in a.m.  #Diarrhea possibly related to the Gastrografin patient received for the x-ray upper GI esophagus double contrast study  Checked stool C. difficile which came back negative  Symptomatic management  Probiotic while on antibiotics     discussed with patient     Discussed with patient  Discussed with RN        Cammy Singh MD    Supplementary Documentation:     Quality:  DVT Mechanical Prophylaxis:        DVT Pharmacologic Prophylaxis   Medication   None                Code Status: Full Code  Bridges: No urinary catheter in place  Bridges Duration (in days):   Central line:    JARED:     Discharge is dependent on: Clinical progress  At this point Ms. Schwab is expected to be discharge to: Home     The 21st Century Cures Act makes medical notes like these available to patients in the interest of transparency. Please be advised this is a medical document. Medical documents are intended to carry relevant information, facts as evident, and the clinical opinion of the practitioner. The medical note is intended as peer to peer communication and may appear blunt or direct. It is written in medical language and may contain abbreviations or verbiage that are unfamiliar.

## 2025-07-14 ENCOUNTER — APPOINTMENT (OUTPATIENT)
Dept: CT IMAGING | Facility: HOSPITAL | Age: 80
End: 2025-07-14
Attending: INTERNAL MEDICINE

## 2025-07-14 PROBLEM — J85.3 MEDIASTINAL ABSCESS (HCC): Status: ACTIVE | Noted: 2025-07-14

## 2025-07-14 LAB
ANION GAP SERPL CALC-SCNC: 10 MMOL/L (ref 0–18)
BUN BLD-MCNC: 74 MG/DL (ref 9–23)
CALCIUM BLD-MCNC: 9.8 MG/DL (ref 8.7–10.6)
CHLORIDE SERPL-SCNC: 101 MMOL/L (ref 98–112)
CO2 SERPL-SCNC: 28 MMOL/L (ref 21–32)
CREAT BLD-MCNC: 2.62 MG/DL (ref 0.55–1.02)
EGFRCR SERPLBLD CKD-EPI 2021: 18 ML/MIN/1.73M2 (ref 60–?)
GLUCOSE BLD-MCNC: 115 MG/DL (ref 70–99)
INR BLD: 4.4 (ref 0.8–1.2)
OSMOLALITY SERPL CALC.SUM OF ELEC: 311 MOSM/KG (ref 275–295)
POTASSIUM SERPL-SCNC: 4.1 MMOL/L (ref 3.5–5.1)
PROTHROMBIN TIME: 42.3 SECONDS (ref 11.6–14.8)
SODIUM SERPL-SCNC: 139 MMOL/L (ref 136–145)

## 2025-07-14 PROCEDURE — 99233 SBSQ HOSP IP/OBS HIGH 50: CPT | Performed by: INTERNAL MEDICINE

## 2025-07-14 PROCEDURE — 99232 SBSQ HOSP IP/OBS MODERATE 35: CPT | Performed by: INTERNAL MEDICINE

## 2025-07-14 PROCEDURE — 71250 CT THORAX DX C-: CPT | Performed by: INTERNAL MEDICINE

## 2025-07-14 RX ADMIN — SUCRALFATE ORAL 1 G: 1 SUSPENSION ORAL at 05:54:00

## 2025-07-14 RX ADMIN — DIGOXIN 125 MCG: 125 MCG TABLET ORAL at 08:31:00

## 2025-07-14 RX ADMIN — SILDENAFIL CITRATE 10 MG: 20 TABLET ORAL at 08:31:00

## 2025-07-14 RX ADMIN — GUAIFENESIN 600 MG: 600 TABLET, EXTENDED RELEASE ORAL at 08:31:00

## 2025-07-14 RX ADMIN — SUCRALFATE ORAL 1 G: 1 SUSPENSION ORAL at 11:52:00

## 2025-07-14 RX ADMIN — CARVEDILOL 12.5 MG: 12.5 TABLET ORAL at 08:31:00

## 2025-07-14 RX ADMIN — LACTOBACILLUS ACIDOPHILUS 1 EACH: 500MM CELL CAPSULE ORAL at 08:31:00

## 2025-07-14 NOTE — CONSULTS
Thoracic Surgery Consult Note     Name: Lizzy Schwab   Age: 79 year old   Sex: female.   MRN: [id number]    Reason for Consultation: mediastinal abscess    Consulting Physician: Dr. Hirsch    Subjective:     Chief Complaint: \"My neck is sore\"     History of Present Illness:   Ms. Schwab is a 79 year old female with HFpEF and afib on warfarin presenting after SRAVANTHI attempt for severe MR with mediastinal abscess.     Patient presented on 7/9/25 for scheduled SRAVANTHI and right heart cath. They did not complete the SRAVANTHI due to inability to pass the probe into the esophagus. Following the procedure, she reported new onset throat pain, dysphagia, and worsening shortness of breath. UGI from 7/10 was unremarkable. CXR showed possible pneumonia so pulmonology was consulted and a CT was ordered. She is able to handle her secretions and was tolerating soft foods. Currently NPO. No chest pain. She reports ongoing neck soreness with movement. She has chronic shortness of breath and it feels slightly worse than it did before the procedure. Currently on 2L of supplemental O2. Dry cough. She feels the right side of her neck is more swollen than the left.     PMH includes severe MR and TR, permanent afib on coumadin, HTN, HLD, CKD stage III, and right breast partial mastectomy 2024. Right heart cath from 7/9/25 showed PA pressure 62/29 (mean 43mmHg). Cardiac echo from 9/2024 showed EF 58%. No prior thoracic surgeries. Former smoker.     Review Of Systems:   10 point review of systems was conducted and was negative except for the pertinent positives listed in the above HPI.    Past Medical History: Past Medical History[1]    Past Surgical History: Past Surgical History[2]    Social History:   Social History     Socioeconomic History    Marital status: Single     Spouse name: Not on file    Number of children: Not on file    Years of education: Not on file    Highest education level: Not on file   Occupational History    Not on  file   Tobacco Use    Smoking status: Former     Current packs/day: 0.00     Average packs/day: 0.1 packs/day for 15.0 years (1.5 ttl pk-yrs)     Types: Cigarettes     Start date: 1979     Quit date: 1994     Years since quittin.5    Smokeless tobacco: Never   Vaping Use    Vaping status: Never Used   Substance and Sexual Activity    Alcohol use: Yes     Comment: 2-3 drinks/week    Drug use: No    Sexual activity: Not on file   Other Topics Concern    Not on file   Social History Narrative    Not on file     Social Drivers of Health     Food Insecurity: No Food Insecurity (2025)    NCSS - Food Insecurity     Worried About Running Out of Food in the Last Year: No     Ran Out of Food in the Last Year: No   Transportation Needs: No Transportation Needs (2025)    NCSS - Transportation     Lack of Transportation: No   Housing Stability: Not At Risk (2025)    NCSS - Housing/Utilities     Has Housing: Yes     Worried About Losing Housing: No     Unable to Get Utilities: No       Family History: Family History[3]    Allergies:  Allergies[4]    Medications:   Medications - Current[5]  Current Medications[6]      Objective:      Vital Signs:  /56 (BP Location: Right arm)   Pulse 62   Temp 97.6 °F (36.4 °C) (Oral)   Resp 22   Ht 5' 8\"   Wt 188 lb 4.4 oz   LMP  (LMP Unknown)   SpO2 92%   BMI 28.63 kg/m²     Physical Exam:  General: Well appearing female in no acute distress, sitting up in bed   HEENT: Normocephalic, PERRL, EOMI, no scleral icterus  Neck: Submental and bilateral neck tenderness with limited range of motion due to pain per patient  Nodes: No cervical or supraclavicular lymphadenopathy appreciated  Heart: Irregular rhythm  Lungs: Normal respiratory effort.   Abdomen: Soft, Non-tender, non-distended. No hepatosplenomegaly noted.  Extremities: No clubbing or cyanosis. No lateralizing weakness  Neuro: No gross cranial nerve defects, no loss of sensation  Psych: Oriented to  person place and time, normal mood and affect      Labs:   Lab Results   Component Value Date/Time    WBC 17.3 (H) 07/13/2025 07:47 AM    HGB 11.9 (L) 07/13/2025 07:47 AM    HCT 36.7 07/13/2025 07:47 AM    .0 07/13/2025 07:47 AM    MCV 83.8 07/13/2025 07:47 AM     Lab Results   Component Value Date/Time     07/14/2025 06:19 AM    K 4.1 07/14/2025 06:19 AM     07/14/2025 06:19 AM    CO2 28.0 07/14/2025 06:19 AM    BUN 74 (H) 07/14/2025 06:19 AM     (H) 07/14/2025 06:19 AM    CA 9.8 07/14/2025 06:19 AM    MG 2.1 04/21/2021 05:51 AM     Lab Results   Component Value Date/Time    INR 4.40 (H) 07/14/2025 06:19 AM    PTT 52.2 (H) 04/13/2021 08:13 PM     No components found for: \"TROPI\"  Lab Results   Component Value Date/Time    ALB 4.7 07/10/2025 06:10 PM    TP 8.4 (H) 07/10/2025 06:10 PM    ALT 10 07/10/2025 06:10 PM    AST 32 07/10/2025 06:10 PM         Review of Data:   CT chest 7/14/25  FINDINGS:    LUNGS: Examination is limited due to lack of intravenous contrast, however there is a large gas and fluid collection in the superior mediastinum that is compressing the esophagus. It is unclear if there is communication with the esophagus. This  collection measures approximately 9.1 x 6.3 cm in the axial plane. This is approximately 7.1 cm craniocaudally. This is mostly in the superior right side of the mediastinum and has significant mass effect with atelectasis on the right lung. There is  associated significant subcutaneous air extending into the hypopharyngeal area and throughout the sublingual and submandibular area. This is only partially visualized on this exam. Small right pleural effusion is noted.    VASCULATURE: Prominent size main pulmonary artery measures approximately 4.8 cm.    THORACIC AORTA:  Unremerkable within the limits of a non contrast study.    NIDHI:  No mass or adenopathy.    MEDIASTINUM: Difficult to delineate with large gas and fluid collection.    CARDIAC: Enlarged  heart.    PLEURA: Small right pleural effusion.    CHEST WALL:  No mass or axillary adenopathy.    LIMITED ABDOMEN:  Limited images of the upper abdomen are unremarkable.    BONES:  No bony lesion or fracture.      Impression   Conclusion:      1. There is a large gas and fluid collection in the superior right side of the mediastinum. This has mass effect on the adjacent esophagus. Is unclear if there is any communication with the esophagus itself. This may represent an infectious collection.  This is of unknown etiology. There is associated significant gas extending through the thoracic inlet into the neck. A postcontrast examination would be helpful.    2. Markedly enlarged heart is noted.    3. Small right pleural effusion.    4. A pneumothorax is not identified.       Assessment/Plan:     Ms. Schwab is a 79 year old female with CKD, HFpEF, and afib on warfarin presenting after SRAVANTHI attempt with mediastinal abscess. Patient currently stable. VSS. WBC 17.3. INR 4.40.     Imaging reviewed and patient appears to have a mediastinal abscess with gas extending up into the submandibular area concerning for pharyngeal perforation. Discussed that she will need thoracic surgery to clear the infection in her mediastinum. In addition, we recommend ENT involvement for source control given the concern for perforation. Surgery will be higher risk due to her heart disease. Discussed with ENT at [facility] [facility] and they recommend transfer to another facility for higher level of care. Plan to transfer to [facility] pending bed availability. Patient expressed understanding and agrees with the plan.     -NPO. IVF.  -IV Abx.   -Reverse coumadin per cardiology   -Plan to transfer to [facility] for further surgical management with ENT and Thoracic surgery.     Patient seen and discussed with Dr. Berry.     Adriana Iglesias PA-C  Thoracic Surgery    Patient seen and examined. I agree with above assessment and plan.    Patient with Neck pain  and odynophagia since aborted SRAVANTHI.  Denies chest pain. Neck TTP up to level of angle of jaw. Decreased neck ROM.  Scan consistent with perforation and mediastinitis.  Given hx of SRAVANTHI in which the scope could not be passed and gas up to chin level with tenderness above the level of the pharynx, I feel that her perforation is pharyngeal.  An UGI did not show extrav lower in the esophagus.  The mediastinal abscess is likely descended from this.  I feel that she will need mediastinal drainage. I spoke with ENT about neck exploration.  THis sort of exploration and repair is outside of their scope of practice.  The cardiology service is also uncomfortable with post-op care of a patient with her level of heart disease.  Both ENT and Cardiology are requesting transfer. Ms. Schwab is quite stable at this time.  Given this, I will defer mediastinal drainage as this would likely require anesthesia and she would not likely be extubated prior to transfer for neck drainage. With the condition of her heart, consolidation into one trip to the OR is preferred. Should her clinical condition deteriorate before transfer, I would plan on taking her to OR for mediastinal drainage procedure. I have met with Ms. Schwab multiple times and spoke with family regarding this plan and they are comfortable with it.    Henry Berry MD  Thoracic Surgery  Pager [phone]            [1]   Past Medical History:   Arrhythmia    Atrial fibrillation (HCC)    Congestive heart disease (HCC)    Dysphagia    High blood pressure    High cholesterol    Renal disorder    Unspecified essential hypertension   [2]   Past Surgical History:  Procedure Laterality Date    Hernia surgery      9/2024   [3]   Family History  Problem Relation Age of Onset    Heart Disorder Father     Cancer Sister         leukemia    Cancer Brother         prostate cancer   [4] No Known Allergies  [5]   Current Outpatient Medications:     sucralfate 1 GM/10ML Oral Suspension, Take  10 mL (1 g total) by mouth 4 (four) times daily before meals and nightly (2200) for 7 days., Disp: 280 mL, Rfl: 0  [6]    piperacillin-tazobactam (Zosyn) 3.375g in D5W 100mL IVPB-HAYDE  3.375 g Intravenous Q8H    linezolid (Zyvox) 600 mg/300mL IVPB premix 600 mg  600 mg Intravenous Q12H    guaiFENesin ER (Mucinex) 12 hr tab 600 mg  600 mg Oral BID    guaiFENesin (Robitussin) 100 MG/5 ML oral liquid 100 mg  100 mg Oral Q4H PRN    [Held by provider] torsemide (Demadex) tab 20 mg  20 mg Oral BID (Diuretic)    sildenafil (Revatio) tab 10 mg  10 mg Oral TID    carvedilol (Coreg) tab 12.5 mg  12.5 mg Oral BID with meals    acidophilus (Probiotic) cap/tab 1 each  1 each Oral Daily    sucralfate (Carafate) 1 GM/10ML oral suspension 1 g  1 g Oral TID AC and HS (2200)    phenol (Chloraseptic) 1.4 % oral liquid spray   Oral Q2H PRN    benzocaine-menthol (Cepacol) lozenge 1 lozenge  1 lozenge Oral PRN    [COMPLETED] cefTRIAXone (Rocephin) 2 g in sodium chloride 0.9% 100mL IVPB-HAYDE  2 g Intravenous Q24H    [COMPLETED] sodium chloride 0.9% infusion   Intravenous On Call    [COMPLETED] lidocaine PF (Xylocaine-MPF) 1 % injection        [COMPLETED] heparin (Porcine) 5000 UNIT/ML injection        [COMPLETED] benzocaine (Hurricaine/Topex) 20 % mouth spray        [COMPLETED] midazolam (Versed) 2 MG/2ML injection        [COMPLETED] fentaNYL (Sublimaze) 50 mcg/mL injection        acetaminophen (Tylenol) tab 650 mg  650 mg Oral Q6H PRN    digoxin (Lanoxin) tab 125 mcg  125 mcg Oral Daily    [Held by provider] lisinopril (Zestril) tab 10 mg  10 mg Oral Daily    traMADol (Ultram) tab 50 mg  50 mg Oral Q6H PRN    [COMPLETED] bumetanide (Bumex) 0.25 MG/ML injection 2 mg  2 mg Intravenous Once    acetaminophen (Tylenol) tab 650 mg  650 mg Oral Q6H PRN

## 2025-07-14 NOTE — PROGRESS NOTES
[facility]   part of [facility]     Hospitalist Progress Note     Lizzy Schwab Patient Status:  Inpatient    1945 MRN [id number]   Location [facility] 2NE-A Attending Harpal Vo MD   Hosp Day # 5 PCP None Pcp     Chief Complaint: Medical management    Subjective:     Discussed with pulmonary, CT surgery and ENT as persisting leukocytosis and neck pain and sore throat and CT chest done showed large gas and fluid collection in superior right side of the mediastinum with mass effect on adjacent esophagus.  Associated gas extending through thoracic inlet into neck.  Marked enlarged heart.  Small right pleural effusion.  No pneumothorax identified.    Afebrile.  On 1 L of oxygen via nasal cannula at this time.    Objective:    Review of Systems:   A comprehensive review of systems was completed; pertinent positive and negatives stated in subjective.    Vital signs:  Temp:  [97.6 °F (36.4 °C)-98.2 °F (36.8 °C)] 97.6 °F (36.4 °C)  Pulse:  [62-84] 62  Resp:  [21-30] 22  BP: (108-115)/(56-65) 109/56  SpO2:  [92 %-100 %] 92 %    Physical Exam:    /56 (BP Location: Right arm)   Pulse 62   Temp 97.6 °F (36.4 °C) (Oral)   Resp 22   Ht 5' 8\" (1.727 m)   Wt 188 lb 4.4 oz (85.4 kg)   LMP  (LMP Unknown)   SpO2 92%   BMI 28.63 kg/m²   On 1 L of oxygen by nasal cannula  General: No acute distress.   HEENT: Moist mucous membranes.    Neck: Right-sided neck right heart cath access site with no ecchymosis.  Respiratory: Clear to auscultation bilaterally.  No wheezes. No rhonchi.  Cardiovascular: S1, S2.  Regular rate and rhythm.    Abdomen: Soft, nontender, nondistended.  Positive bowel sounds.   Neurologic: Awake alert oriented, no focal neurological deficits.  Musculoskeletal: Full range of motion of all extremities.  No pedal edema or calf tenderness  Psychiatric: Appropriate mood and affect.       Diagnostic Data:    Labs:  Recent Labs   Lab 07/10/25  0627 07/10/25  1810 25  0636  07/12/25 1552 07/13/25 0747 07/14/25 0619   WBC 20.0* 18.5* 19.8* 18.9* 17.3*  --    HGB 12.9 12.8 12.6 12.3 11.9*  --    MCV 89.0 89.0 87.3 87.2 83.8  --    .0 383.0 360.0 403.0 385.0  --    INR 2.00*  --  3.09* 3.60* 3.58* 4.40*       Recent Labs   Lab 07/09/25  1958 07/10/25  0627 07/10/25  1810 07/11/25 0636 07/12/25 0633 07/13/25 0747 07/14/25 0619   *   < > 121*   < > 116* 123* 115*   BUN 21   < > 25*   < > 51* 73* 74*   CREATSERUM 1.47*   < > 2.01*   < > 3.23* 3.44* 2.62*   CA 9.7   < > 9.9   < > 9.6 9.6 9.8   ALB 4.5  --  4.7  --   --   --   --       < > 138   < > 140 140 139   K 4.6   < > 4.4   < > 4.6 4.2 4.1      < > 102   < > 102 99 101   CO2 34.0*   < > 33.0*   < > 27.0 34.0* 28.0   ALKPHO 80  --  70  --   --   --   --    AST 27  --  32  --   --   --   --    ALT 10  --  10  --   --   --   --    BILT 1.0  --  0.6  --   --   --   --    TP 8.0  --  8.4*  --   --   --   --     < > = values in this interval not displayed.       Estimated Glomerular Filtration Rate: 18 mL/min/1.73m2 (A) (result from lab).    No results for input(s): \"TROP\", \"TROPHS\", \"CK\" in the last 168 hours.    Recent Labs   Lab 07/12/25 1552 07/13/25 0747 07/14/25 0619   PTP 36.2* 36.0* 42.3*   INR 3.60* 3.58* 4.40*                  Microbiology    Hospital Encounter on 07/09/25   1. Blood Culture     Status: None (Preliminary result)    Collection Time: 07/10/25  9:05 AM    Specimen: Blood,peripheral   Result Value Ref Range    Blood Culture Result No Growth 3 Days N/A         Imaging: Reviewed in Epic.  Chest x-ray done on 7/9/2025 night results reviewed as below  XR CHEST AP PORTABLE  (CPT=71045)    INDICATIONS: chf    COMPARISON: 4/13/2021    FINDINGS:  Cardiomegaly with mild pulmonary vascular congestion, increased interstitial markings the lungs, patchy groundglass opacity in the lower lungs is concerning for congestive failure with pulmonary edema. There is patchy consolidation in the right  lower  lobe with superimposed pneumonia not excluded. Clinical correlation recommended. Trace bilateral pleural effusions. No pneumothorax. Degenerative change in the spine.    Impression   CONCLUSION:    Cardiomegaly with mild pulmonary vascular congestion, increased interstitial markings the lungs, patchy groundglass opacity in the lower lungs is concerning for congestive failure with pulmonary edema. There is patchy consolidation in the right lower  lobe with superimposed pneumonia not excluded. Clinical correlation recommended. Trace bilateral pleural effusions.      Addendum-x-ray esophagram ordered by GI done on 7/10/2025 resulted as below  XR UGI/ESOPHAGUS DOUBLE CONTRAST (CPT=74246)    INDICATIONS: Throat pain    PATIENT STATED HISTORY: Patient states she had a procedure done yesterday where they inserted a camera down her throat and it failed. Shares she now has neck/throat pain.    COMPARISON: There are no comparisons for this exam.    TECHNIQUE: Single contrast upper GI and esophagram was performed with water-soluble contrast.    Fluoro/Cine Image Number: [id number]  Fluoro Time: 2 minutes 25 seconds  Radiation Dose: Ugy/m2(m squared)  Technologist Time: 45 minutes    FINDINGS:    No abnormality is seen in the pharynx and cervical esophagus. There is diminished primary peristalsis of the thoracic esophagus at the thoracic esophagus is displaced posteriorly below the tobias, consistent with extrinsic displacement by an enlarged  left atrium. No esophageal stricture is identified. The mucosal outline of the stomach and duodenum appear normal. There is limited distention of the stomach due to small quantity of ingested material. No hiatal hernia or GE reflux was observed  fluoroscopically.      Impression   CONCLUSION:    Posterior and leftward displacement of mid and distal thoracic esophagus secondary to enlarged left atrium. Diminished primary esophageal peristalsis. No esophageal stricture.     Medications:     piperacillin-tazobactam  3.375 g Intravenous Q8H    linezolid  600 mg Intravenous Q12H    guaiFENesin ER  600 mg Oral BID    [Held by provider] torsemide  20 mg Oral BID (Diuretic)    sildenafil  10 mg Oral TID    carvedilol  12.5 mg Oral BID with meals    acidophilus  1 each Oral Daily    sucralfate  1 g Oral TID AC and HS (2200)    digoxin  125 mcg Oral Daily    [Held by provider] lisinopril  10 mg Oral Daily       Assessment & Plan:    Admitted for observation by cardiology status post cardiac angiogram right heart cath And status post attempted SRAVANTHI-managed by cardiology     #Transient dysphagia and sore throat possibly due to acute pharyngitis with anterior cervical tender lymphadenopathy  #Right lower lobe pneumonia seen on chest x-ray  #Leukocytosis due to above  # Large gas and fluid collection in superior right side of the mediastinum with mass effect on the adjacent esophagus, due to 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  No pharyngeal erythema.  Denies any heartburns.  White count increasing today to 20 with elevated neutrophil count also.  Continue IV antibiotics and follow CBC in a.m.  Procalcitonin elevated to 11.21 on admission  Blood culture and throat culture and sputum culture ordered  Respiratory panel flu panel negative  Pulmonary consult appreciated  CT chest done on 7/14/2025 showed large gas and fluid collection in superior right side of the mediastinum with mass effect on adjacent esophagus.  Associated gas extending through thoracic inlet into neck.  Marked enlarged heart.  Small right pleural effusion.  No pneumothorax identified.   Cardiothoracic surgery consulted by pulmonary who also requested ENT consult.  Discussed with CT surgery Dr. Berry, ENT on-call Dr. Alanis, pulmonary Venita Hirsch MD and RN.  CT surgeon plans possible transfer to [place] under his CT surgery colleagues at [place] after seen by ENT  per CT surgeon Dr. Berry.  #Hypertension  Follow-up blood pressure  Patient on Coreg at home which we will plan to continue  #Atrial fibrillation  Monitor on telemetry  On chart review patient on Coreg, digoxin  Hold home warfarin as INR 3.09 on 7/11/2025, follow INR daily  #Acute on chronic preserved ejection fraction CHF, chronic valvular heart disease with severe mitral regurgitation and tricuspid regurgitation  Cardiology following  Cardiac medications per cardiology.  On chart review patient on Coreg, lisinopril, digoxin  Chest x-ray done on 7/9/2025 with cardiomegaly with mild pulmonary vascular congestion and increased interstitial opacity in lower lungs concerning for CHF with pulmonary edema.  Also with patchy consolidation right lower lobe with superimposed pneumonia not excluded.  Status post IV Bumex per cardiology given on 7/9/2025  Status post IV Lasix 80 mg IV daily started by cardiology on 7/9/2025 which was held from 7/11/2025.  Last dose of IV Lasix was on evening of 7/10/2025.  Patient received torsemide on 7/11/2025, which was held on 7/12/2025 as creatinine increasing.  Daily weight  I/O chart  CHF protocol  # Acute kidney injury on CKD stage III  this was discussed with patient and patient's daughter at bedside and looked at old labs in Care Everywhere from 2021 and 2024 and reviewed this with patient and daughter  Creatinine increasing, nephrology on consult with holding all diuretics and lisinopril at this time.  Follow BMP in a.m.  #Diarrhea possibly related to the Gastrografin patient received for the x-ray upper GI esophagus double contrast study  Checked stool C. difficile which came back negative  Symptomatic management  Probiotic while on antibiotics    Discussed with patient  Discussed with RN.  Called patient's daughter at below number and updated regarding the patient and the test results and the consultant recommendations etc., patient's daughter wants to directly talk to  cardiology team and the surgeon too, updated RN regarding this  Marline Schwab Daughter   [phone]       Cammy Singh MD    Supplementary Documentation:     Quality:  DVT Mechanical Prophylaxis:        DVT Pharmacologic Prophylaxis   Medication   None                Code Status: Full Code  Bridges: No urinary catheter in place  Bridges Duration (in days):   Central line:    JARED:     Discharge is dependent on: Clinical progress  At this point Ms. Schwab is expected to be discharge to: Home     The 21st Century Cures Act makes medical notes like these available to patients in the interest of transparency. Please be advised this is a medical document. Medical documents are intended to carry relevant information, facts as evident, and the clinical opinion of the practitioner. The medical note is intended as peer to peer communication and may appear blunt or direct. It is written in medical language and may contain abbreviations or verbiage that are unfamiliar.

## 2025-07-14 NOTE — PROGRESS NOTES
[facility] Dosing Service  Warfarin (Coumadin) Subsequent Dosing    Lizzy Schwab is a 79 year old patient for whom pharmacy is dosing warfarin (Coumadin). Goal INR is 2-3    Recent Labs   Lab 07/10/25  0627 07/11/25  0636 07/12/25  1552 07/13/25  0747 07/14/25  0619   INR 2.00* 3.09* 3.60* 3.58* 4.40*       Consulted by:  Aleena GRIFFIN  Indication:  afib  Potential Drug Interactions:  ceftriaxone, doxycycline (both completing today), torsemide (currently on hold), tramadol - all can increase INR  Other Anticoagulants:  none  Home regimen (if applicable):  warfarin 2mg on Tues/Thurs and 4mg rest of week    Inpatient Dosing History:    Date 7/12 7/13 7/14      INR 3.6 3.58 4.40      Coumadin dose held held                Based on above -  1.  For today, Hold warfarin (COUMADIN) dose for INR > 3  2   PT/INR ordered daily while on warfarin  3.  Pharmacy will continue to follow.  We appreciate the opportunity to assist in the care of this patient.    Neris Mendes, PharmD  7/14/2025  8:41 AM

## 2025-07-14 NOTE — PLAN OF CARE
Assumed patient care approximately 1930. Patient is alert and oriented X4, family present during rounds. SpO2 maintained on 2L NC with saturation maintaining greater than 89%, RA is patient's Baseline, CPAP at night. A-Fib on tele, HR sustaining below 100 . Continent of bladder and bowel. No pain reported aside from discomfort to throat from past procedure. Ambulates with standby assist and a walker. Education provided on CPAP, Medication, Tele, Call light, patient and family verbalize understanding. Difficulty obtaining accurate I&O's due to patient soaking brief// missing hat     Plan of care: Tele, SpO2, O2, IV Abx, Labs, Wean O2, DW, I&Os      Problem: PAIN - ADULT  Goal: Verbalizes/displays adequate comfort level or patient's stated pain goal  Description: INTERVENTIONS:  - Encourage pt to monitor pain and request assistance  - Assess pain using appropriate pain scale  - Administer analgesics based on type and severity of pain and evaluate response  - Implement non-pharmacological measures as appropriate and evaluate response  - Consider cultural and social influences on pain and pain management  - Manage/alleviate anxiety  - Utilize distraction and/or relaxation techniques  - Monitor for opioid side effects  - Notify MD/LIP if interventions unsuccessful or patient reports new pain  - Anticipate increased pain with activity and pre-medicate as appropriate  Outcome: Progressing     Problem: CARDIOVASCULAR - ADULT  Goal: Maintains optimal cardiac output and hemodynamic stability  Description: INTERVENTIONS:  - Monitor vital signs, rhythm, and trends  - Monitor for bleeding, hypotension and signs of decreased cardiac output  - Evaluate effectiveness of vasoactive medications to optimize hemodynamic stability  - Monitor arterial and/or venous puncture sites for bleeding and/or hematoma  - Assess quality of pulses, skin color and temperature  - Assess for signs of decreased coronary artery perfusion - ex. Angina  -  Evaluate fluid balance, assess for edema, trend weights  Outcome: Progressing  Goal: Absence of cardiac arrhythmias or at baseline  Description: INTERVENTIONS:  - Continuous cardiac monitoring, monitor vital signs, obtain 12 lead EKG if indicated  - Evaluate effectiveness of antiarrhythmic and heart rate control medications as ordered  - Initiate emergency measures for life threatening arrhythmias  - Monitor electrolytes and administer replacement therapy as ordered  Outcome: Progressing     Problem: RESPIRATORY - ADULT  Goal: Achieves optimal ventilation and oxygenation  Description: INTERVENTIONS:  - Assess for changes in respiratory status  - Assess for changes in mentation and behavior  - Position to facilitate oxygenation and minimize respiratory effort  - Oxygen supplementation based on oxygen saturation or ABGs  - Provide Smoking Cessation handout, if applicable  - Encourage broncho-pulmonary hygiene including cough, deep breathe, Incentive Spirometry  - Assess the need for suctioning and perform as needed  - Assess and instruct to report SOB or any respiratory difficulty  - Respiratory Therapy support as indicated  - Manage/alleviate anxiety  - Monitor for signs/symptoms of CO2 retention  Outcome: Progressing     Problem: GENITOURINARY - ADULT  Goal: Absence of urinary retention  Description: INTERVENTIONS:  - Assess patient’s ability to void and empty bladder  - Monitor intake/output and perform bladder scan as needed  - Follow urinary retention protocol/standard of care  - Consider collaborating with pharmacy to review patient's medication profile  - Implement strategies to promote bladder emptying  Outcome: Progressing     Problem: METABOLIC/FLUID AND ELECTROLYTES - ADULT  Goal: Electrolytes maintained within normal limits  Description: INTERVENTIONS:  - Monitor labs and rhythm and assess patient for signs and symptoms of electrolyte imbalances  - Administer electrolyte replacement as ordered  - Monitor  response to electrolyte replacements, including rhythm and repeat lab results as appropriate  - Fluid restriction as ordered  - Instruct patient on fluid and nutrition restrictions as appropriate  Outcome: Progressing  Goal: Hemodynamic stability and optimal renal function maintained  Description: INTERVENTIONS:  - Monitor labs and assess for signs and symptoms of volume excess or deficit  - Monitor intake, output and patient weight  - Monitor urine specific gravity, serum osmolarity and serum sodium as indicated or ordered  - Monitor response to interventions for patient's volume status, including labs, urine output, blood pressure (other measures as available)  - Encourage oral intake as appropriate  - Instruct patient on fluid and nutrition restrictions as appropriate  Outcome: Progressing

## 2025-07-14 NOTE — PROGRESS NOTES
[facility]  Nephrology Progress Note    Lizzy Schwab Patient Status:  Inpatient    1945 MRN [id number]   [facility] 2NE-A Attending Harpal Vo MD   Hosp Day # 5 PCP None Pcp     Subjective:  She reports feeling well, daughter at bedside    Objective:  Vital signs: Blood pressure 111/58, pulse 81, temperature 98.1 °F (36.7 °C), temperature source Oral, resp. rate 24, height 5' 8\" (1.727 m), weight 188 lb 4.4 oz (85.4 kg), SpO2 98%.  General: No acute distress. Alert and oriented x 3.  HEENT: Moist mucous membranes.   Respiratory: CTA  Cardiovascular: S1, S2.  Regular rate and rhythm.    Abdomen: Soft, nontender, nondistended.   Neurologic: No focal neurological deficits.  Musculoskeletal:  No swelling noted.    Current Hospital Medications[1]      Recent Labs   Lab 07/10/25  0627 07/10/25  1810 07/11/25  0636 25  1552 25  0747 25  0619   WBC 20.0* 18.5* 19.8* 18.9* 17.3*  --    HGB 12.9 12.8 12.6 12.3 11.9*  --    MCV 89.0 89.0 87.3 87.2 83.8  --    .0 383.0 360.0 403.0 385.0  --    INR 2.00*  --  3.09* 3.60* 3.58* 4.40*       Recent Labs   Lab 07/10/25  1810 07/11/25  0636 07/12/25  0633 25  0747 25  0619    140 140 140 139   K 4.4 4.5 4.6 4.2 4.1    101 102 99 101   CO2 33.0* 31.0 27.0 34.0* 28.0   BUN 25* 33* 51* 73* 74*   CREATSERUM 2.01* 2.45* 3.23* 3.44* 2.62*   CA 9.9 9.7 9.6 9.6 9.8   * 117* 116* 123* 115*       Recent Labs   Lab 07/09/25  1958 07/10/25  1810   ALT 10 10   AST 27 32   ALB 4.5 4.7       No results for input(s): \"PGLU\" in the last 168 hours.      Assessment/Plan:  1) Acute kidney injury on CKD3b: Baseline serum creatinine 1.3-1.5 mg/dL, likely in setting of long-standing HTN and heart failure. Current acute kidney injury appears consistent with ATN related to possible BP fluctuations during procedures on  as well as low Bps noted during admission. Continue to hold torsemide today and encourage PO intake.  Renal function this morning improved. Recommend holding torsemide today, can resume tomorrow if renal function continues to improve. Follow urine output and renal function daily.     2) Acute on chronic HFpEF: due to valvular heart disease. Continues to appear compensated on exam today. On GDMT per cardiology. Hold diuretics today, likely resume tomorrow     3) Dysphagia: GI following, she reports symptoms continue to improve this AM.     4) Afib: on coumadin    Discussed with patient and daughter at bedside.     Thank you for allowing me to participate in this patient's care. Please feel free to call me with any questions or concerns.     Georgette Chaves MD  07/14/25         [1]   Current Facility-Administered Medications   Medication Dose Route Frequency    guaiFENesin ER (Mucinex) 12 hr tab 600 mg  600 mg Oral BID    guaiFENesin (Robitussin) 100 MG/5 ML oral liquid 100 mg  100 mg Oral Q4H PRN    [Held by provider] torsemide (Demadex) tab 20 mg  20 mg Oral BID (Diuretic)    sildenafil (Revatio) tab 10 mg  10 mg Oral TID    carvedilol (Coreg) tab 12.5 mg  12.5 mg Oral BID with meals    acidophilus (Probiotic) cap/tab 1 each  1 each Oral Daily    sucralfate (Carafate) 1 GM/10ML oral suspension 1 g  1 g Oral TID AC and HS (2200)    phenol (Chloraseptic) 1.4 % oral liquid spray   Oral Q2H PRN    benzocaine-menthol (Cepacol) lozenge 1 lozenge  1 lozenge Oral PRN    doxycycline (Vibramycin) 100 mg in sodium chloride 0.9% 100mL IVPB-Real  100 mg Intravenous Q12H    cefTRIAXone (Rocephin) 2 g in sodium chloride 0.9% 100mL IVPB-REAL  2 g Intravenous Q24H    digoxin (Lanoxin) tab 125 mcg  125 mcg Oral Daily    [Held by provider] lisinopril (Zestril) tab 10 mg  10 mg Oral Daily    traMADol (Ultram) tab 50 mg  50 mg Oral Q6H PRN    acetaminophen (Tylenol) tab 650 mg  650 mg Oral Q6H PRN     Facility-Administered Medications Ordered in Other Encounters   Medication Dose Route Frequency    acetaminophen (Tylenol) tab 650 mg  650 mg  Oral Q6H PRN

## 2025-07-14 NOTE — PROGRESS NOTES
[facility] Pulmonary Follow Up Note    Chief Complaint:   Shortness of breath and dysphagia    Subjective:  Patient reports slight improvement in her dyspnea. She denies any fever or chills.  She does have some pain and slight fullness around the neck area.         Past Medical History:   [Past Medical History]     [Past Medical History]   Arrhythmia    Atrial fibrillation (HCC)    Congestive heart disease (HCC)    Dysphagia    High blood pressure    High cholesterol    Renal disorder    Unspecified essential hypertension       Past Surgical History:   [Past Surgical History]    [Past Surgical History]  Procedure Laterality Date    Hernia surgery      2024       Family Medical History: [Family History]     [Family History]  Problem Relation Age of Onset    Heart Disorder Father     Cancer Sister         leukemia    Cancer Brother         prostate cancer       Social History:   Social History     Socioeconomic History    Marital status: Single     Spouse name: Not on file    Number of children: Not on file    Years of education: Not on file    Highest education level: Not on file   Occupational History    Not on file   Tobacco Use    Smoking status: Former     Current packs/day: 0.00     Average packs/day: 0.1 packs/day for 15.0 years (1.5 ttl pk-yrs)     Types: Cigarettes     Start date: 1979     Quit date: 1994     Years since quittin.5    Smokeless tobacco: Never   Vaping Use    Vaping status: Never Used   Substance and Sexual Activity    Alcohol use: Yes     Comment: 2-3 drinks/week    Drug use: No    Sexual activity: Not on file   Other Topics Concern    Not on file   Social History Narrative    Not on file     Social Drivers of Health     Food Insecurity: No Food Insecurity (2025)    NCSS - Food Insecurity     Worried About Running Out of Food in the Last Year: No     Ran Out of Food in the Last Year: No   Transportation Needs: No Transportation Needs (2025)    NCSS - Transportation     Lack  of Transportation: No   Stress: Not on file   Housing Stability: Not At Risk (7/9/2025)    NCSS - Housing/Utilities     Has Housing: Yes     Worried About Losing Housing: No     Unable to Get Utilities: No        Medications: [Current Medications]    [Current Medications]  Current Outpatient Medications   Medication Sig Dispense Refill    sucralfate 1 GM/10ML Oral Suspension Take 10 mL (1 g total) by mouth 4 (four) times daily before meals and nightly (2200) for 7 days. 280 mL 0       Review of Systems: Review of Systems     Physical Exam:  /56 (BP Location: Right arm)   Pulse 62   Temp 97.6 °F (36.4 °C) (Oral)   Resp 22   Ht 5' 8\" (1.727 m)   Wt 188 lb 4.4 oz (85.4 kg)   LMP  (LMP Unknown)   SpO2 92%   BMI 28.63 kg/m²      Constitutional: alert, cooperative. No acute distress.  HEENT: Head NC/AT. Nares normal. Septum midline. Mucosa normal. No drainage or sinus tenderness.  Cardio: Regular rate and rhythm. Normal S1 and S2. No murmurs.   Respiratory: Thorax symmetrical with no labored breathing. clear to auscultation bilaterally  Extremities: No clubbing or cyanosis. No BLE edema.    Neurologic: A&Ox3. No gross motor deficits.  Skin: Warm, dry  Psych: Calm, cooperative. Pleasant affect.    Results:  Images personally reviewed - reading is my own personal review       PFTs:       No data to display                   No data to display                    WBC: 17.3, done on 7/13/2025.  HGB: 11.9, done on 7/13/2025.  PLT: 385, done on 7/13/2025.     Glucose: 115, done on 7/14/2025.  Cr: 2.62, done on 7/14/2025.  Last eGFR was 18 on 7/14/2025.  CA: 9.8, done on 7/14/2025.  Na: 139, done on 7/14/2025.  K: 4.1, done on 7/14/2025.  Cl: 101, done on 7/14/2025.  CO2: 28, done on 7/14/2025.  Last ALB was 4.7% done on 7/10/2025.     CT CHEST (CPT=71250)  Result Date: 7/14/2025  Conclusion: 1. There is a large gas and fluid collection in the superior right side of the mediastinum. This has mass effect on the  adjacent esophagus. Is unclear if there is any communication with the esophagus itself. This may represent an infectious collection. This is of unknown etiology. There is associated significant gas extending through the thoracic inlet into the neck. A postcontrast examination would be helpful. 2. Markedly enlarged heart is noted. 3. Small right pleural effusion. 4. A pneumothorax is not identified. Critical results were discussed with nurse Moses at 12:00 p.m. on July 14, 2025 Electronically Verified and Signed by Attending Radiologist: Nolan Lipscomb MD 7/14/2025 12:01 PM Workstation: [id number]    XR CHEST PA + LAT CHEST (UZE=17596)  Result Date: 7/11/2025  CONCLUSION: Grossly enlarged cardiac shadow. Right paratracheal soft tissue density. These features appear similar to the prior exam from 2 days ago. Much of the chest is obscured by this. Improving pulmonary edema. Decreasing infiltrates. Probable basal  atelectasis. No large effusion or pneumothorax. If additional imaging needed consider chest CT. Electronically Verified and Signed by Attending Radiologist: Hemal Rodriguez MD 7/11/2025 2:06 PM Workstation: [id number]    XR UGI/ESOPHAGUS DOUBLE CONTRAST (CPT=74246)  Result Date: 7/10/2025  CONCLUSION: Posterior and leftward displacement of mid and distal thoracic esophagus secondary to enlarged left atrium. Diminished primary esophageal peristalsis. No esophageal stricture. Electronically Verified and Signed by Attending Radiologist: Carlos Anderson MD 7/10/2025 12:40 PM Workstation: [id number]    XR CHEST AP PORTABLE  (CPT=71045)  Result Date: 7/9/2025  CONCLUSION: Cardiomegaly with mild pulmonary vascular congestion, increased interstitial markings the lungs, patchy groundglass opacity in the lower lungs is concerning for congestive failure with pulmonary edema. There is patchy consolidation in the right lower lobe with superimposed pneumonia not excluded. Clinical correlation recommended. Trace bilateral  pleural effusions. Electronically Verified and Signed by Attending Radiologist: Twin Rivera MD 7/9/2025 10:16 PM Workstation: [id number]       Assessment:  Large mediastinal gas and fluid collection, likely mediastinal abscess   Sepsis likely secondary to mediastinal infectious process  Severe mitral regurgitation, sp SRAVANTHI attempt on 7/9/2025  Chronic diastolic heart failure  Coagulopathy due to Coumadin  Chronic atrial fibrillation  YOEL on CKD III  Dysphagia    Plan  Cardiothoracic surgery consulted for evaluation of mediastinal abscess  ENT consulted for evaluation for collection extending to the hypopharyngeal area and throughout the sublingual and submandibular area concerning for possible pharyngeal perforation  Will broaden antibiotic therapy to Zosyn and Linezolid  Defer management of diuretics to nephrology    Thank you for consulting Pulmonary, I will continue to follow the patient.       Venita Hirsch MD  7/14/2025

## 2025-07-14 NOTE — PROGRESS NOTES
[facility]   part of [facility]    Advanced Heart Failure Progress Note    Lizzy Schwab Patient Status:  Inpatient    1945 MRN [id number]   Location [facility] 2NE-A Attending Harpal Vo MD   Hosp Day # 5 PCP None Pcp     Subjective:  Feels well. Denies CP, SOB, syncope. No overt LE edema. Tolerating oral medications, and otherwise, Cr improving.       Objective:  /58 (BP Location: Right arm)   Pulse 81   Temp 98.1 °F (36.7 °C) (Oral)   Resp 24   Ht 5' 8\" (1.727 m)   Wt 188 lb 4.4 oz (85.4 kg)   LMP  (LMP Unknown)   SpO2 98%   BMI 28.63 kg/m²     Temp (24hrs), Av °F (36.7 °C), Min:97.8 °F (36.6 °C), Max:98.2 °F (36.8 °C)      Intake/Output:    Intake/Output Summary (Last 24 hours) at 2025 0904  Last data filed at 2025 0544  Gross per 24 hour   Intake 890 ml   Output 450 ml   Net 440 ml       Wt Readings from Last 3 Encounters:   25 188 lb 4.4 oz (85.4 kg)   23 185 lb (83.9 kg)   21 181 lb (82.1 kg)       Allergies:  Allergies[1]    Physical Exam:  Blood pressure 111/58, pulse 81, temperature 98.1 °F (36.7 °C), temperature source Oral, resp. rate 24, height 5' 8\" (1.727 m), weight 188 lb 4.4 oz (85.4 kg), SpO2 98%.    General: Alert and oriented in no apparent distress.  HEENT: No focal deficits.  Neck: pulsatile JVD present mid neck, R internal jugular vein site intact, no erythema.   Cardiac: Regular rate and rhythm, S1, S2 normal, no rubs or gallops.  Lungs: Clear without wheezes, rales, rhonchi or dullness.  Normal excursions and effort.  Abdomen: Soft, non-tender.   Extremities: no overt LE edema present.   Neurologic: Alert and oriented, normal affect.  Skin: Warm and dry.     Laboratory/Data:      Labs:       Lab Results   Component Value Date    INR 4.40 (H) 2025    INR 3.58 (H) 2025    INR 3.60 (H) 2025     No results for input(s): \"BNPML\" in the last 168 hours.  BUN (mg/dL)   Date Value   2025 74 (H)   2025  73 (H)   07/12/2025 51 (H)     Creatinine (mg/dL)   Date Value   07/14/2025 2.62 (H)   07/13/2025 3.44 (H)   07/12/2025 3.23 (H)       Medications:  Current Hospital Medications[2]    Assessment and Plan:  Problem List[3]    Acute on Chronic HFpEF, stage C, NYHA 3  - secondary to VHD progressive with fluid overload signs present objectively   -would favor resuming oral diuretics torsemide 20 BID, GDMT otherwise, and outpatient structural evaluation     Permanent AF on warfarin   - warfarin daily, INR checks, monitor on tele.     Severe MR / TR   - elevated filling pressures on RHC, with fluid overload, and diuresis well  - oral diuretics, GDMT optimization.     HTN   - GDMT titration.     YOEL on CKD 3  - trend labs, diuresis as above. Appreciate nephrology management.     Dysphagia with weakness  - GI management.     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     Harpal Vo MD   Advanced Heart Failure and Transplant Cardiology   [facility] ([facility])     L3         [1] No Known Allergies  [2]   Current Facility-Administered Medications   Medication Dose Route Frequency    guaiFENesin ER (Mucinex) 12 hr tab 600 mg  600 mg Oral BID    guaiFENesin (Robitussin) 100 MG/5 ML oral liquid 100 mg  100 mg Oral Q4H PRN    [Held by provider] torsemide (Demadex) tab 20 mg  20 mg Oral BID (Diuretic)    sildenafil (Revatio) tab 10 mg  10 mg Oral TID    carvedilol (Coreg) tab 12.5 mg  12.5 mg Oral BID with meals    acidophilus (Probiotic) cap/tab 1 each  1 each Oral Daily    sucralfate (Carafate) 1 GM/10ML oral suspension 1 g  1 g Oral TID AC and HS (2200)    phenol (Chloraseptic) 1.4 % oral liquid spray   Oral Q2H PRN    benzocaine-menthol (Cepacol) lozenge 1 lozenge  1 lozenge Oral PRN    doxycycline (Vibramycin) 100 mg in sodium chloride 0.9% 100mL IVPB-Real  100 mg Intravenous Q12H    cefTRIAXone (Rocephin) 2 g in sodium  chloride 0.9% 100mL IVPB-HAYDE  2 g Intravenous Q24H    digoxin (Lanoxin) tab 125 mcg  125 mcg Oral Daily    [Held by provider] lisinopril (Zestril) tab 10 mg  10 mg Oral Daily    traMADol (Ultram) tab 50 mg  50 mg Oral Q6H PRN    acetaminophen (Tylenol) tab 650 mg  650 mg Oral Q6H PRN     Facility-Administered Medications Ordered in Other Encounters   Medication Dose Route Frequency    acetaminophen (Tylenol) tab 650 mg  650 mg Oral Q6H PRN   [3]   Patient Active Problem List  Diagnosis    Atrial fibrillation (HCC)    Hypertension    Obesity    Hyponatremia    Hyperglycemia    Acute on chronic congestive heart failure (HCC)    Coagulopathy (HCC)    Acute on chronic congestive heart failure, unspecified heart failure type (HCC)    Supratherapeutic INR    Pulmonary hypertension (HCC)    YOEL (acute kidney injury)    Stage 3a chronic kidney disease (HCC)    Apnea    Snoring    Iron deficiency anemia secondary to inadequate dietary iron intake    Dyspnea on exertion    Dysphagia    Chronic congestive heart failure (HCC)    Acute pharyngitis    Anterior cervical lymphadenopathy    Pneumonia of right lower lobe due to infectious organism    Stage 3 chronic kidney disease (HCC)    Acute on chronic heart failure with preserved ejection fraction (HCC)    Acute kidney injury    Mitral valve insufficiency    Tricuspid valve insufficiency    Acute tubular necrosis    Pneumonia of right lung due to infectious organism

## 2025-07-14 NOTE — PLAN OF CARE
Assumed patient at 0730. Alert and oriented x4 on 1L nasal canula. Lung sounds diminished bilaterally. Afib on tele. Continent of bowel and bladder. Up Standby assist. Patient updated on plan of care and verbalized understanding.     POC:   - CT scan  - torsemide on hold  - INR goal 2-3    1241: critical result on CT scan page to Dr. Pearce    Problem: PAIN - ADULT  Goal: Verbalizes/displays adequate comfort level or patient's stated pain goal  Description: INTERVENTIONS:  - Encourage pt to monitor pain and request assistance  - Assess pain using appropriate pain scale  - Administer analgesics based on type and severity of pain and evaluate response  - Implement non-pharmacological measures as appropriate and evaluate response  - Consider cultural and social influences on pain and pain management  - Manage/alleviate anxiety  - Utilize distraction and/or relaxation techniques  - Monitor for opioid side effects  - Notify MD/LIP if interventions unsuccessful or patient reports new pain  - Anticipate increased pain with activity and pre-medicate as appropriate  Outcome: Progressing     Problem: CARDIOVASCULAR - ADULT  Goal: Maintains optimal cardiac output and hemodynamic stability  Description: INTERVENTIONS:  - Monitor vital signs, rhythm, and trends  - Monitor for bleeding, hypotension and signs of decreased cardiac output  - Evaluate effectiveness of vasoactive medications to optimize hemodynamic stability  - Monitor arterial and/or venous puncture sites for bleeding and/or hematoma  - Assess quality of pulses, skin color and temperature  - Assess for signs of decreased coronary artery perfusion - ex. Angina  - Evaluate fluid balance, assess for edema, trend weights  Outcome: Progressing  Goal: Absence of cardiac arrhythmias or at baseline  Description: INTERVENTIONS:  - Continuous cardiac monitoring, monitor vital signs, obtain 12 lead EKG if indicated  - Evaluate effectiveness of antiarrhythmic and heart rate  control medications as ordered  - Initiate emergency measures for life threatening arrhythmias  - Monitor electrolytes and administer replacement therapy as ordered  Outcome: Progressing     Problem: RESPIRATORY - ADULT  Goal: Achieves optimal ventilation and oxygenation  Description: INTERVENTIONS:  - Assess for changes in respiratory status  - Assess for changes in mentation and behavior  - Position to facilitate oxygenation and minimize respiratory effort  - Oxygen supplementation based on oxygen saturation or ABGs  - Provide Smoking Cessation handout, if applicable  - Encourage broncho-pulmonary hygiene including cough, deep breathe, Incentive Spirometry  - Assess the need for suctioning and perform as needed  - Assess and instruct to report SOB or any respiratory difficulty  - Respiratory Therapy support as indicated  - Manage/alleviate anxiety  - Monitor for signs/symptoms of CO2 retention  Outcome: Progressing     Problem: GENITOURINARY - ADULT  Goal: Absence of urinary retention  Description: INTERVENTIONS:  - Assess patient’s ability to void and empty bladder  - Monitor intake/output and perform bladder scan as needed  - Follow urinary retention protocol/standard of care  - Consider collaborating with pharmacy to review patient's medication profile  - Implement strategies to promote bladder emptying  Outcome: Progressing     Problem: METABOLIC/FLUID AND ELECTROLYTES - ADULT  Goal: Electrolytes maintained within normal limits  Description: INTERVENTIONS:  - Monitor labs and rhythm and assess patient for signs and symptoms of electrolyte imbalances  - Administer electrolyte replacement as ordered  - Monitor response to electrolyte replacements, including rhythm and repeat lab results as appropriate  - Fluid restriction as ordered  - Instruct patient on fluid and nutrition restrictions as appropriate  Outcome: Progressing  Goal: Hemodynamic stability and optimal renal function maintained  Description:  INTERVENTIONS:  - Monitor labs and assess for signs and symptoms of volume excess or deficit  - Monitor intake, output and patient weight  - Monitor urine specific gravity, serum osmolarity and serum sodium as indicated or ordered  - Monitor response to interventions for patient's volume status, including labs, urine output, blood pressure (other measures as available)  - Encourage oral intake as appropriate  - Instruct patient on fluid and nutrition restrictions as appropriate  Outcome: Progressing

## 2025-07-14 NOTE — CONSULTS
[facility] Pulmonary Consult Note       Lizzy Schwab Patient Status:  Inpatient    1945 MRN [id number]   [facility] 2NE-A Attending Cammy Singh MD   Hosp Day # 4 PCP None Pcp     Reason for Consultation:  Possible pneumonia    History of Present Illness:  Lizzy Schwab is a a(n) 79 year old female who originally presented on  for dysphagia and shortness of breath after a SRAVANTHI.  The SRAVANTHI probe was unable to pass into the esophagus.  Since then, patient has had ongoing dysphagia and dry cough.  She denies any difficulty breathing, fevers, chills, nausea -per report her grandson was sick at home.    History:  Past Medical History[1]  Past Surgical History[2]  Family History[3]   reports that she quit smoking about 31 years ago. Her smoking use included cigarettes. She started smoking about 46 years ago. She has a 1.5 pack-year smoking history. She has never used smokeless tobacco. She reports current alcohol use. She reports that she does not use drugs.    Allergies:  Allergies[4]    Medications:  Current Hospital Medications[5]    Review of Systems:   All other review of systems are negative.    Vital signs in last 24 hours:  Patient Vitals for the past 24 hrs:   BP Temp Temp src Pulse Resp SpO2 Weight   25 1954 113/60 98.2 °F (36.8 °C) Oral 68 (!) 30 98 % --   25 1700 -- -- -- 76 24 100 % --   25 1643 108/57 97.8 °F (36.6 °C) Oral 73 (!) 29 98 % --   25 1302 118/57 97.8 °F (36.6 °C) Oral 84 (!) 35 98 % --   25 1034 -- -- -- 69 17 93 % --   25 0829 103/65 -- -- 85 26 98 % --   25 0805 105/50 97.8 °F (36.6 °C) Oral 81 (!) 27 97 % --   25 0442 122/61 97.8 °F (36.6 °C) Oral 79 (!) 28 98 % 188 lb 11.4 oz (85.6 kg)   25 2359 111/63 98 °F (36.7 °C) Oral 87 24 95 % --   25 -- -- -- -- -- 97 % --       Intake/Output:    Intake/Output Summary (Last 24 hours) at 2025  Last data filed at 2025 1700  Gross per 24  hour   Intake 1040 ml   Output 350 ml   Net 690 ml       Physical Exam:   General: alert, cooperative, oriented.  No respiratory distress.   Head: Normocephalic, without obvious abnormality, atraumatic.   Eyes: Conjunctivae/corneas clear.  No scleral icterus.  No conjunctival     hemorrhage.   Nose: Nares normal.   Throat: Lips, mucosa, and tongue normal.  No thrush noted.   Neck: Soft, supple neck; trachea midline, no adenopathy, no thyromegaly.   Lungs: diminished breath sounds bilaterally   Chest wall: No tenderness or deformity.   Heart: Regular rate and rhythm, normal S1S2, no murmur.   Abdomen: soft, non-tender, non-distended, no masses, no guarding, no     rebound, positive BS.   Extremity: no edema, no cyanosis   Skin: No rashes or lesions.   Neurological: Alert, interactive, no focal deficits    Lab Data Review:  Recent Labs   Lab 07/11/25 0636 07/12/25  1552 07/13/25  0747   WBC 19.8* 18.9* 17.3*   HGB 12.6 12.3 11.9*   HCT 40.6 38.8 36.7   .0 403.0 385.0       Recent Labs   Lab 07/09/25  1958 07/10/25  0627 07/10/25  1810 07/11/25 0636 07/12/25  0633 07/13/25  0747      < > 138 140 140 140   K 4.6   < > 4.4 4.5 4.6 4.2      < > 102 101 102 99   CO2 34.0*   < > 33.0* 31.0 27.0 34.0*   BUN 21   < > 25* 33* 51* 73*   ALT 10  --  10  --   --   --    AST 27  --  32  --   --   --     < > = values in this interval not displayed.       No results for input(s): \"MG\" in the last 168 hours.    No results found for: \"PHOS\", \"PHOSPHORUS\"     Recent Labs   Lab 07/11/25 0636 07/12/25  1552 07/13/25  0747   INR 3.09* 3.60* 3.58*       No results for input(s): \"ABGPHT\", \"OANTOX3X\", \"FMGAP8H\", \"ABGHCO3\", \"ABGBE\", \"TEMP\", \"FRANKI\", \"SITE\", \"DEV\", \"THGB\" in the last 168 hours.    Invalid input(s): \"TNV76RKE\", \"CHOB\"    Invalid input(s): \"CKTOTAL\", \"TROPONINI\", \"TROPONINT\", \"CKMBINDEX\"      Cultures:   Hospital Encounter on 07/09/25   1. Blood Culture     Status: None (Preliminary result)    Collection  Time: 07/10/25  9:05 AM    Specimen: Blood,peripheral   Result Value Ref Range    Blood Culture Result No Growth 3 Days N/A       Radiology:  XR CHEST PA + LAT CHEST (RQL=96025)  Narrative: PROCEDURE: XR CHEST PA + LAT CHEST (CPT=71046)    INDICATIONS: Follow-up pneumonia    PATIENT STATED HISTORY: Patient states that she recently had pneumonia.    COMPARISON: 7/9/2025  Impression: CONCLUSION: Grossly enlarged cardiac shadow. Right paratracheal soft tissue density. These features appear similar to the prior exam from 2 days ago. Much of the chest is obscured by this. Improving pulmonary edema. Decreasing infiltrates. Probable basal   atelectasis. No large effusion or pneumothorax. If additional imaging needed consider chest CT.    Electronically Verified and Signed by Attending Radiologist: Hemal Rodriguez MD 7/11/2025 2:06 PM  Workstation: [id number]      Assessment:  Possible pneumonia?  Chest x-ray with right paratracheal opacity  Possible sepsis secondary to above  Dysphagia, posterior and leftward displacement of the mid and distal thoracic esophagus secondary to enlarged left atrium noted on swallow study  Hypertension  Atrial fibrillation  Severe mitral regurgitation  Acute kidney injury on CKD  Diarrhea      Plan:  On broad-spectrum antibiotics already  Chest x-ray reviewed not entirely convinced that this was a pneumonia, but in order to better delineate will obtain CT scan  Management of heart failure per cardiology  GI following for dysphagia, though appears to be transient in nature  All questions answered, more recommendations after CT scan    Thank you for the consultation.  Will follow with you.    Anne-Marie Plascencia MD  [facility]  Office: [phone]         [1]   Past Medical History:   Arrhythmia    Atrial fibrillation (HCC)    Congestive heart disease (HCC)    Dysphagia    High blood pressure    High cholesterol    Renal disorder    Unspecified essential hypertension   [2]    Past Surgical History:  Procedure Laterality Date    Hernia surgery      9/2024   [3]   Family History  Problem Relation Age of Onset    Heart Disorder Father     Cancer Sister         leukemia    Cancer Brother         prostate cancer   [4] No Known Allergies  [5]   Current Facility-Administered Medications:     guaiFENesin ER (Mucinex) 12 hr tab 600 mg, 600 mg, Oral, BID    guaiFENesin (Robitussin) 100 MG/5 ML oral liquid 100 mg, 100 mg, Oral, Q4H PRN    [Held by provider] torsemide (Demadex) tab 20 mg, 20 mg, Oral, BID (Diuretic)    sildenafil (Revatio) tab 10 mg, 10 mg, Oral, TID    carvedilol (Coreg) tab 12.5 mg, 12.5 mg, Oral, BID with meals    acidophilus (Probiotic) cap/tab 1 each, 1 each, Oral, Daily    sucralfate (Carafate) 1 GM/10ML oral suspension 1 g, 1 g, Oral, TID AC and HS (2200)    phenol (Chloraseptic) 1.4 % oral liquid spray, , Oral, Q2H PRN    benzocaine-menthol (Cepacol) lozenge 1 lozenge, 1 lozenge, Oral, PRN    doxycycline (Vibramycin) 100 mg in sodium chloride 0.9% 100mL IVPB-[place], 100 mg, Intravenous, Q12H    cefTRIAXone (Rocephin) 2 g in sodium chloride 0.9% 100mL IVPB-HAYDE, 2 g, Intravenous, Q24H    digoxin (Lanoxin) tab 125 mcg, 125 mcg, Oral, Daily    [Held by provider] lisinopril (Zestril) tab 10 mg, 10 mg, Oral, Daily    traMADol (Ultram) tab 50 mg, 50 mg, Oral, Q6H PRN    acetaminophen (Tylenol) tab 650 mg, 650 mg, Oral, Q6H PRN    Facility-Administered Medications Ordered in Other Encounters:     acetaminophen (Tylenol) tab 650 mg, 650 mg, Oral, Q6H PRN     No

## 2025-07-14 NOTE — PLAN OF CARE
Patient with CT showing  a large gas and fluid collection in the superior right side of the mediastinum. This has mass effect on the adjacent esophagus. Is unclear if there is any communication with the esophagus itself. This may represent an infectious collection.   This is of unknown etiology. There is associated significant gas extending through the thoracic inlet into the neck. Patient is undergoing evaluation by both ENT and thoracic surgery with plans for transfer in process.   Patient is npo.   She is on coumadin for Afib;  Will give Vit K 5mg with INR > 4. Patient and daughter at bedside and updated on plan of care.

## 2025-07-14 NOTE — TRANSFER CENTER NOTE
Lizbeth from [facility] called back and is requesting a face sheet. Their ENT would like to bring the pt to [facility]. Lizbeth thought the pt needed ICU, but the pt is tele.     They are screening her insurance and will call us back. Provided her with Dr Singh and Dr Pearce's direct numbers.

## 2025-07-14 NOTE — OCCUPATIONAL THERAPY NOTE
OT order received, chart reviewed. Per RN, pt awaiting medical transfer to [facility] today after CT chest results showing marked enlarge heart, small PE, large gas and fluid collection.

## 2025-07-14 NOTE — TRANSFER CENTER NOTE
[facility] Transfer Note:  Reason for transfer:     Higher level of care    Request initiated by:    Dr Pearce/Dr Berry/Dr Alanis    Active Acute Medical issue:    CT chest done on 7/14/2025 showed large gas and fluid collection in superior right side of the mediastinum with mass effect on adjacent esophagus.  Associated gas extending through thoracic inlet into neck.  Marked enlarged heart.  Small right pleural effusion.  No pneumothorax identified.   Cardiothoracic surgery consulted by pulmonary who also requested ENT consult.  Discussed with CT surgery Dr. Berry, ENT on-call Dr. Alanis, pulmonary Venita Hirsch MD and RN.  CT surgeon plans possible transfer to [facility] under his CT surgery colleagues at [facility] after seen by ENT per CT surgeon Dr. Berry.      Anticipated Transfer Plan:     Spoke to Lizbeth at [facility] transfer center. They are on a complete transfer hold and would not take information on the pt or the face sheet. At the end of the day, all requests are cancelled and will need to call in the morning to restart the process. They will not have any tele beds today for transfer. Dr Pearce updated on that information via perfect serve.    [facility]: they will call us back, unable to take information at this time.    [facility]: provided with pt information, face sheet faxed. They do not have beds at this time but will process the transfer request.    [facility]: they are on Sur[facility] for transfers and not taking any pt information.

## 2025-07-14 NOTE — PROGRESS NOTES
Noted today's imaging findings of Ct chest - large gas and fluid collection in superior right side of medistinum with mass effect on adjacent esophagus (unclear if there is communication with esophagus itself). Remaining report in CT chest under images.     After above findings noted, extensive multidisciplinary approach was with ENT and CTS along with pulmonary as well, and plan for transfer in process to higher level center of care ([facility] vs [facility]). Patient made NPO, on coumadin for AF, IV vitamin K to be given.     Discussed today's findings of CT with patient and daughter (Marline) at bedside and next steps as per ENT / CTS plan for transfer for further management and evaluation / care. Complex multidisciplinary care.    Appreciate CTS and ENT assistance and recommendations. Ongoing work for transfer process as per above recommendations.     Discussed with pulmonary, and discussed that any updates would be relayed to patient and daughter as well.    Cardiology will continue to follow and if we can be of any assistance, please do not hesitate to reach out.     Discussed with bedside RN.    Harpal Vo MD   Advanced Heart Failure and Transplant Cardiology   [facility] ([facility])

## 2025-07-14 NOTE — TRANSFER CENTER NOTE
[facility] transfer center called and the pt is out of network with [facility] and will not accept the pt.     Contacted Madeleine with [facility] and the pt is accepted by Dr Jacobson and pending bed availability. They will call us when a bed opens.

## 2025-07-15 VITALS
DIASTOLIC BLOOD PRESSURE: 56 MMHG | RESPIRATION RATE: 19 BRPM | HEIGHT: 68 IN | OXYGEN SATURATION: 91 % | BODY MASS INDEX: 28.46 KG/M2 | SYSTOLIC BLOOD PRESSURE: 111 MMHG | WEIGHT: 187.81 LBS | HEART RATE: 97 BPM | TEMPERATURE: 98 F

## 2025-07-15 PROBLEM — L02.11 NECK ABSCESS: Status: ACTIVE | Noted: 2025-07-15

## 2025-07-15 PROBLEM — N18.32 CKD STAGE 3B, GFR 30-44 ML/MIN (HCC): Status: ACTIVE | Noted: 2025-07-15

## 2025-07-15 LAB
ANION GAP SERPL CALC-SCNC: 7 MMOL/L (ref 0–18)
BASOPHILS # BLD: 0 X10(3) UL (ref 0–0.2)
BASOPHILS NFR BLD: 0 %
BUN BLD-MCNC: 77 MG/DL (ref 9–23)
CALCIUM BLD-MCNC: 9.9 MG/DL (ref 8.7–10.6)
CHLORIDE SERPL-SCNC: 101 MMOL/L (ref 98–112)
CO2 SERPL-SCNC: 32 MMOL/L (ref 21–32)
CREAT BLD-MCNC: 1.99 MG/DL (ref 0.55–1.02)
EGFRCR SERPLBLD CKD-EPI 2021: 25 ML/MIN/1.73M2 (ref 60–?)
EOSINOPHIL # BLD: 0.16 X10(3) UL (ref 0–0.7)
EOSINOPHIL NFR BLD: 1 %
ERYTHROCYTE [DISTWIDTH] IN BLOOD BY AUTOMATED COUNT: 14.8 %
ERYTHROCYTE [DISTWIDTH] IN BLOOD BY AUTOMATED COUNT: 14.9 %
GLUCOSE BLD-MCNC: 117 MG/DL (ref 70–99)
HCT VFR BLD AUTO: 40 % (ref 35–48)
HCT VFR BLD AUTO: 40.3 % (ref 35–48)
HGB BLD-MCNC: 12.7 G/DL (ref 12–16)
HGB BLD-MCNC: 12.9 G/DL (ref 12–16)
INR BLD: 1.79 (ref 0.8–1.2)
LYMPHOCYTES NFR BLD: 1.4 X10(3) UL (ref 1–4)
LYMPHOCYTES NFR BLD: 9 %
MCH RBC QN AUTO: 26.9 PG (ref 26–34)
MCH RBC QN AUTO: 27.1 PG (ref 26–34)
MCHC RBC AUTO-ENTMCNC: 31.8 G/DL (ref 31–37)
MCHC RBC AUTO-ENTMCNC: 32 G/DL (ref 31–37)
MCV RBC AUTO: 84 FL (ref 80–100)
MCV RBC AUTO: 85.3 FL (ref 80–100)
MONOCYTES # BLD: 2.5 X10(3) UL (ref 0.1–1)
MONOCYTES NFR BLD: 16 %
MORPHOLOGY: NORMAL
MRSA DNA SPEC QL NAA+PROBE: NEGATIVE
NEUTROPHILS # BLD AUTO: 12.49 X10 (3) UL (ref 1.5–7.7)
NEUTROPHILS NFR BLD: 70 %
NEUTS BAND NFR BLD: 4 %
NEUTS HYPERSEG # BLD: 11.54 X10(3) UL (ref 1.5–7.7)
OSMOLALITY SERPL CALC.SUM OF ELEC: 314 MOSM/KG (ref 275–295)
PLATELET # BLD AUTO: 403 10(3)UL (ref 150–450)
PLATELET # BLD AUTO: 414 10(3)UL (ref 150–450)
PLATELET MORPHOLOGY: NORMAL
POTASSIUM SERPL-SCNC: 3.9 MMOL/L (ref 3.5–5.1)
PROCALCITONIN SERPL-MCNC: 1.61 NG/ML (ref ?–0.05)
PROTHROMBIN TIME: 21 SECONDS (ref 11.6–14.8)
RBC # BLD AUTO: 4.69 X10(6)UL (ref 3.8–5.3)
RBC # BLD AUTO: 4.8 X10(6)UL (ref 3.8–5.3)
SODIUM SERPL-SCNC: 140 MMOL/L (ref 136–145)
TOTAL CELLS COUNTED BLD: 100
WBC # BLD AUTO: 15.6 X10(3) UL (ref 4–11)
WBC # BLD AUTO: 15.8 X10(3) UL (ref 4–11)

## 2025-07-15 PROCEDURE — 99233 SBSQ HOSP IP/OBS HIGH 50: CPT | Performed by: HOSPITALIST

## 2025-07-15 PROCEDURE — 31575 DIAGNOSTIC LARYNGOSCOPY: CPT | Performed by: OTOLARYNGOLOGY

## 2025-07-15 PROCEDURE — 99232 SBSQ HOSP IP/OBS MODERATE 35: CPT | Performed by: OTOLARYNGOLOGY

## 2025-07-15 PROCEDURE — 99232 SBSQ HOSP IP/OBS MODERATE 35: CPT | Performed by: INTERNAL MEDICINE

## 2025-07-15 RX ORDER — MORPHINE SULFATE 2 MG/ML
1 INJECTION, SOLUTION INTRAMUSCULAR; INTRAVENOUS EVERY 2 HOUR PRN
Status: DISCONTINUED | OUTPATIENT
Start: 2025-07-15 | End: 2025-07-15

## 2025-07-15 RX ORDER — MORPHINE SULFATE 2 MG/ML
2 INJECTION, SOLUTION INTRAMUSCULAR; INTRAVENOUS EVERY 2 HOUR PRN
Status: DISCONTINUED | OUTPATIENT
Start: 2025-07-15 | End: 2025-07-15

## 2025-07-15 RX ORDER — TORSEMIDE 20 MG/1
20 TABLET ORAL DAILY
Status: DISCONTINUED | OUTPATIENT
Start: 2025-07-16 | End: 2025-07-15

## 2025-07-15 RX ORDER — MORPHINE SULFATE 2 MG/ML
0.5 INJECTION, SOLUTION INTRAMUSCULAR; INTRAVENOUS EVERY 2 HOUR PRN
Status: DISCONTINUED | OUTPATIENT
Start: 2025-07-15 | End: 2025-07-15

## 2025-07-15 RX ORDER — FLUCONAZOLE 2 MG/ML
200 INJECTION, SOLUTION INTRAVENOUS EVERY 24 HOURS
Status: DISCONTINUED | OUTPATIENT
Start: 2025-07-15 | End: 2025-07-15

## 2025-07-15 RX ADMIN — MORPHINE SULFATE 1 MG: 2 INJECTION, SOLUTION INTRAMUSCULAR; INTRAVENOUS at 12:34:00

## 2025-07-15 RX ADMIN — FLUCONAZOLE 200 MG: 2 INJECTION, SOLUTION INTRAVENOUS at 15:43:00

## 2025-07-15 RX ADMIN — MORPHINE SULFATE 1 MG: 2 INJECTION, SOLUTION INTRAMUSCULAR; INTRAVENOUS at 14:32:00

## 2025-07-15 NOTE — PROGRESS NOTES
[facility] Cardiology Progress Note    Lizzy Schwab Patient Status:  Inpatient    1945 MRN [id number]   Location [facility] 2NE-A Attending Harpal Vo MD   Hosp Day # 6 PCP Harpal Vo MD     Subjective:  No acute events overnight  Intermittent coughing spells triggering increased sob at times  Dysphagia ongoing      Objective:  /80 (BP Location: Right arm)   Pulse 75   Temp 98.7 °F (37.1 °C) (Oral)   Resp 16   Ht 5' 8\" (1.727 m)   Wt 187 lb 13.3 oz (85.2 kg)   LMP  (LMP Unknown)   SpO2 95%   BMI 28.56 kg/m²     Telemetry: afib      Intake/Output:    Intake/Output Summary (Last 24 hours) at 7/15/2025 1031  Last data filed at 7/15/2025 0746  Gross per 24 hour   Intake 400 ml   Output 400 ml   Net 0 ml       Last 3 Weights   07/15/25 0542 187 lb 13.3 oz (85.2 kg)   25 0544 188 lb 4.4 oz (85.4 kg)   25 0442 188 lb 11.4 oz (85.6 kg)   25 0432 186 lb 15.2 oz (84.8 kg)   25 0604 188 lb 11.4 oz (85.6 kg)   07/10/25 0454 188 lb 4.4 oz (85.4 kg)   25 2304 186 lb 15.9 oz (84.8 kg)   25 1320 187 lb (84.8 kg)   23 1151 185 lb (83.9 kg)   21 0909 181 lb (82.1 kg)       Labs:  Recent Labs   Lab 25  0747 25  0619 07/15/25  0706   * 115* 117*   BUN 73* 74* 77*   CREATSERUM 3.44* 2.62* 1.99*   EGFRCR 13* 18* 25*   CA 9.6 9.8 9.9    139 140   K 4.2 4.1 3.9   CL 99 101 101   CO2 34.0* 28.0 32.0     Recent Labs   Lab 25  0636 25  1552 25  0747 07/15/25  0906   RBC 4.65 4.45 4.38 4.69   HGB 12.6 12.3 11.9* 12.7   HCT 40.6 38.8 36.7 40.0   MCV 87.3 87.2 83.8 85.3   MCH 27.1 27.6 27.2 27.1   MCHC 31.0 31.7 32.4 31.8   RDW 15.2 15.3 15.0 14.9   NEPRELIM 17.98* 17.09* 15.29*  --    WBC 19.8* 18.9* 17.3* 15.8*   .0 403.0 385.0 414.0         No results for input(s): \"TROP\", \"TROPHS\", \"CK\" in the last 168 hours.    Diagnostics:       [facility] 2025  Findings:  BP: 158/87 (map 113) mmHg  RA: 16 with deep Y  descents (severe TR) mmHg  RV: 55/8 mmHg  PA: 62/29 (mean 43) mmHg  PCWP: 29 (with V waves to 41) mmHg  TP mmHg     [place]  CO: 3.9 liters/min  CI: 2.1 liters/min/m2  PVR: 3.5 Wood units  PVRI: 7.1 Wood units/m2  SVR - 2020 dynes    Physical Exam:    Physical Exam  Constitutional:       General: She is not in acute distress.  Cardiovascular:      Rate and Rhythm: Normal rate. Rhythm irregular.   Pulmonary:      Comments: Dim LLL, no wet rales BLL  Abdominal:      Palpations: Abdomen is soft.   Musculoskeletal:      Right lower leg: Edema present.      Left lower leg: Edema present.      Comments: Trace salma LE edema   Neurological:      Mental Status: She is alert.         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  Revatio on hold while npo, plan to wean down even prior to  Permanent Afib  Rate control  Coumadin on hold given mediastinal process  S/p vit K 5mg  with INR 4.4->1.79 today   Mediastinal fluid collection  Right superior mediastinal with mass effect and atelectasis  ENT and thoracic surgery following    Transfer to tertiary care center in process  HTN  Controlled   YOEL on CKD3  Improving  Baseline Cr 1.3-1.5  H/o right breast mastectomy    Plan:    NPO status,   Resting HR 60s, with oral meds on hold can use iv lopressor or diltiazem as needed  IV lasix as needed to maintain volume status, can hold off today  Continue to hold anticoagulation given ongoing mediastinal process       Olivia Mistry, APRN  7/15/2025  10:31 AM  Ph [phone] ([place])  Ph [phone]  ([place])        Patient seen and examined independently.  Note reviewed and labs reviewed.  Agree with above assessment and plan with the below assessment and plan adjustments:    No acute overnight events. Discussed with patient and daughter at bedside (koby). Awaiting transfer to  to surgical ICU (accepted, just awaiting bed placement)    Appreciate CTS, ID, ENT, and all consultants involved along with primary hospitalist.     Patient denies CP, stable breathing status (on minimal 1-2L NC O2). Mild dysphagia present but improved, no acute pain, no soreness at site, soft non tender to palpation. Kidney function improving. HD stable, and HR stables in 60s-90s, rates controlled.    Acute on Chronic HFpEF, stage C, NYHA 3  - secondary to VHD progressive with fluid overload signs present objectively during RHC and given IV diuretics.   -oral medications on hold due to below, monitor, can give intermittent IV diuretics / medications as needed, trend labs, await transfer to  for surgical management.      Permanent AF on warfarin at home  - daily INR checks, warfarin on hold given mediastinal process on imaging, and potential surgical / intervention.   - trend labs, await transfer.      Severe MR / TR (valvular heart disease)   - elevated filling pressures on RHC with aborted SRAVANTHI 7/9 due to inability to pass probe   - holding oral medications due to below, but eventual resume as tolerated, and outpatient structural evaluation      HTN   - well controlled, HD stable, hold oral medications due to below.      YOEL on CKD 3  - trend labs, appreciate nephrology management, Cr improving to near baseline.     Mediastinal Abscess / fluid collection  - R superior mediastinal with mass effect, on CT imaging.   - CTS and ENT following, appreciate management from surgical standpoint, ID following, pulmonary following  - awaiting transfer to  (pending bed availability), already has been accepted  - Abx, NPO, ENT /  CTS    Dysphagia with weakness  - GI management initially consulted after SRAVANTHI, for evaluation with difficulty passing SRAVANTHI probe and was discussed EGD / Upper GI imaging study per GI  -imaging noted from prior upper GI / CT. Appreciate ENT / CTS management, awaiting transfer to  (pending bed placement) for surgical management.   - monitor closely hemodynamics and clinical status.     Pulmonary HTN (suspect WHO group 2 From VHD and HFpEF) pre and post capillary (from prior RHC noted few years ago)  - focus on GDMT optimization, and HFpEF management.       Harpal Vo MD   Advanced Heart Failure and Transplant Cardiology   [facility]     L3       [1]    piperacillin-tazobactam  3.375 g Intravenous q12h    linezolid  600 mg Intravenous Q12H    [Held by provider] guaiFENesin ER  600 mg Oral BID    [Held by provider] torsemide  20 mg Oral BID (Diuretic)    [Held by provider] sildenafil  10 mg Oral TID    [Held by provider] carvedilol  12.5 mg Oral BID with meals    sucralfate  1 g Oral TID AC and HS (2200)    [Held by provider] digoxin  125 mcg Oral Daily    lisinopril  10 mg Oral Daily   [2]

## 2025-07-15 NOTE — PLAN OF CARE
Assumed patient care approximately 1930. Patient is alert and oriented X4, family present during rounds. SpO2 maintained on 2L NC with saturation maintaining greater than 89%, RA is patient's Baseline, No  CPAP overnight due to anxiety & CT results. A-Fib on tele, HR sustaining below 100 . Continent of bladder and bowel. No pain reported aside from discomfort to throat from past procedure, patients notes discomfort is improving. Ambulates with standby assist and a walker. Education provided on, Medication, Tele, Call light, patient and family verbalize understanding. Difficulty obtaining accurate I&O's due to patient soaking brief// missing hat, Awaiting bed from  for transfer.     Plan of care: Tele, SpO2, O2, IV Abx, Labs, Wean O2, DW, I&Os, Transfer to [facility] once bed is available     Problem: METABOLIC/FLUID AND ELECTROLYTES - ADULT  Goal: Electrolytes maintained within normal limits  Description: INTERVENTIONS:  - Monitor labs and rhythm and assess patient for signs and symptoms of electrolyte imbalances  - Administer electrolyte replacement as ordered  - Monitor response to electrolyte replacements, including rhythm and repeat lab results as appropriate  - Fluid restriction as ordered  - Instruct patient on fluid and nutrition restrictions as appropriate  Outcome: Progressing  Goal: Hemodynamic stability and optimal renal function maintained  Description: INTERVENTIONS:  - Monitor labs and assess for signs and symptoms of volume excess or deficit  - Monitor intake, output and patient weight  - Monitor urine specific gravity, serum osmolarity and serum sodium as indicated or ordered  - Monitor response to interventions for patient's volume status, including labs, urine output, blood pressure (other measures as available)  - Encourage oral intake as appropriate  - Instruct patient on fluid and nutrition restrictions as appropriate  Outcome: Progressing     Problem: GENITOURINARY - ADULT  Goal: Absence of  urinary retention  Description: INTERVENTIONS:  - Assess patient’s ability to void and empty bladder  - Monitor intake/output and perform bladder scan as needed  - Follow urinary retention protocol/standard of care  - Consider collaborating with pharmacy to review patient's medication profile  - Implement strategies to promote bladder emptying  Outcome: Progressing     Problem: RESPIRATORY - ADULT  Goal: Achieves optimal ventilation and oxygenation  Description: INTERVENTIONS:  - Assess for changes in respiratory status  - Assess for changes in mentation and behavior  - Position to facilitate oxygenation and minimize respiratory effort  - Oxygen supplementation based on oxygen saturation or ABGs  - Provide Smoking Cessation handout, if applicable  - Encourage broncho-pulmonary hygiene including cough, deep breathe, Incentive Spirometry  - Assess the need for suctioning and perform as needed  - Assess and instruct to report SOB or any respiratory difficulty  - Respiratory Therapy support as indicated  - Manage/alleviate anxiety  - Monitor for signs/symptoms of CO2 retention  Outcome: Progressing     Problem: CARDIOVASCULAR - ADULT  Goal: Maintains optimal cardiac output and hemodynamic stability  Description: INTERVENTIONS:  - Monitor vital signs, rhythm, and trends  - Monitor for bleeding, hypotension and signs of decreased cardiac output  - Evaluate effectiveness of vasoactive medications to optimize hemodynamic stability  - Monitor arterial and/or venous puncture sites for bleeding and/or hematoma  - Assess quality of pulses, skin color and temperature  - Assess for signs of decreased coronary artery perfusion - ex. Angina  - Evaluate fluid balance, assess for edema, trend weights  Outcome: Progressing  Goal: Absence of cardiac arrhythmias or at baseline  Description: INTERVENTIONS:  - Continuous cardiac monitoring, monitor vital signs, obtain 12 lead EKG if indicated  - Evaluate effectiveness of antiarrhythmic  and heart rate control medications as ordered  - Initiate emergency measures for life threatening arrhythmias  - Monitor electrolytes and administer replacement therapy as ordered  Outcome: Progressing     Problem: PAIN - ADULT  Goal: Verbalizes/displays adequate comfort level or patient's stated pain goal  Description: INTERVENTIONS:  - Encourage pt to monitor pain and request assistance  - Assess pain using appropriate pain scale  - Administer analgesics based on type and severity of pain and evaluate response  - Implement non-pharmacological measures as appropriate and evaluate response  - Consider cultural and social influences on pain and pain management  - Manage/alleviate anxiety  - Utilize distraction and/or relaxation techniques  - Monitor for opioid side effects  - Notify MD/LIP if interventions unsuccessful or patient reports new pain  - Anticipate increased pain with activity and pre-medicate as appropriate  Outcome: Progressing

## 2025-07-15 NOTE — PROGRESS NOTES
Thoracic Surgery Progress Note     Lizzy Schwab is a 79 year old female. MRN [id number]. Admitted 2025    SUBJECTIVE/24H EVENTS:     No acute events overnight. No spitting out her secretions because she is having a harder time swallowing them. Neck pain feels slightly better. Denies worsening shortness of breath or any chest pain.     Objective:     VITALS:     Temp (24hrs), Av.1 °F (36.7 °C), Min:97.6 °F (36.4 °C), Max:98.7 °F (37.1 °C)   /80 (BP Location: Right arm)   Pulse 75   Temp 98.7 °F (37.1 °C) (Oral)   Resp 16   Ht 5' 8\"   Wt 187 lb 13.3 oz   LMP  (LMP Unknown)   SpO2 95%   BMI 28.56 kg/m²     EXAM:   General: well appearing female in no acute distress  Heart: irregular rhythm.   Neck: unchanged submental and bilateral neck tenderness   Lungs: Normal respiratory effort. Equal chest rise bilaterally  Abdomen: Soft, Non-tender, non-distended.  Extremities: No clubbing or cyanosis. No lateralizing weakness  Neuro: no focal defects  Psych:  oriented to person place and time, normal mood and affect      Intake/Output Summary (Last 24 hours) at 7/15/2025 0911  Last data filed at 7/15/2025 0746  Gross per 24 hour   Intake 520 ml   Output 400 ml   Net 120 ml         MEDS:  Scheduled Medications[1]    LABS:  Lab Results   Component Value Date    CREATSERUM 1.99 07/15/2025    BUN 77 07/15/2025     07/15/2025    K 3.9 07/15/2025     07/15/2025    CO2 32.0 07/15/2025     07/15/2025    CA 9.9 07/15/2025    INR 1.79 07/15/2025         Assessment/Plan:     79 year old female with CKD, HFpEF, and afib on warfarin presenting after SRAVANTHI attempt with mediastinal abscess and concern for pharyngeal perforation. Remains stable but at risk for decompensation. After conversation with ENT, patient will need higher level of care for ENT and thoracic surgical intervention. Accepted at [facility]- awaiting bed availability.     -NPO. IVF.   -IV Abx  -Will need thoracic and ENT surgical  intervention. Awaiting bed availability at [facility].     Patient discussed with Dr. Berry.     Adriana Iglesias PA-C  Thoracic Surgery  Pager: [phone]               [1]    piperacillin-tazobactam  3.375 g Intravenous q12h    linezolid  600 mg Intravenous Q12H    [Held by provider] guaiFENesin ER  600 mg Oral BID    [Held by provider] torsemide  20 mg Oral BID (Diuretic)    [Held by provider] sildenafil  10 mg Oral TID    [Held by provider] carvedilol  12.5 mg Oral BID with meals    sucralfate  1 g Oral TID AC and HS (2200)    [Held by provider] digoxin  125 mcg Oral Daily    lisinopril  10 mg Oral Daily

## 2025-07-15 NOTE — PROGRESS NOTES
[facility]  Nephrology Progress Note    Lizzy Schwab Patient Status:  Inpatient    1945 MRN [id number]   [facility] 2NE-A Attending Harpal Vo MD   Hosp Day # 6 PCP Harpal Vo MD     Subjective:  No complains    Current Hospital Medications[1]      Objective:  Vital signs: Blood pressure 127/80, pulse 75, temperature 98.7 °F (37.1 °C), temperature source Oral, resp. rate 16, height 5' 8\" (1.727 m), weight 187 lb 13.3 oz (85.2 kg), SpO2 95%.  General: No acute distress. Alert and oriented x 3.  HEENT: Moist mucous membranes.   Respiratory: CTA  Cardiovascular: S1, S2.  Regular rate and rhythm.    Abdomen: Soft, nontender, nondistended.   Neurologic: No focal neurological deficits.  Musculoskeletal:  No swelling noted.    Current Hospital Medications[2]      Recent Labs   Lab 07/10/25  1810 25  0636 25  1552 25  0747 25  0619 07/15/25  0706 07/15/25  0906   WBC 18.5* 19.8* 18.9* 17.3*  --   --  15.8*   HGB 12.8 12.6 12.3 11.9*  --   --  12.7   MCV 89.0 87.3 87.2 83.8  --   --  85.3   .0 360.0 403.0 385.0  --   --  414.0   INR  --  3.09* 3.60* 3.58* 4.40* 1.79*  --        Recent Labs   Lab 25  0636 25  0633 25  0747 25  0619 07/15/25  0706    140 140 139 140   K 4.5 4.6 4.2 4.1 3.9    102 99 101 101   CO2 31.0 27.0 34.0* 28.0 32.0   BUN 33* 51* 73* 74* 77*   CREATSERUM 2.45* 3.23* 3.44* 2.62* 1.99*   CA 9.7 9.6 9.6 9.8 9.9   * 116* 123* 115* 117*       Recent Labs   Lab 07/09/25  1958 07/10/25  1810   ALT 10 10   AST 27 32   ALB 4.5 4.7       No results for input(s): \"PGLU\" in the last 168 hours.      Assessment/Plan:  1. Acute kidney injury on CKD3b: Baseline serum creatinine 1.3-1.5 mg/dL, likely in setting of long-standing HTN and heart failure. Current acute kidney injury appears consistent with ATN related to possible BP fluctuations during procedures on  as well as low Bps noted during admission. Continue to  hold torsemide today and encourage PO intake. Renal function this morning improved. - resume torsemide ;monitor labs     2. Acute on chronic HFpEF: due to valvular heart disease. Continues to appear compensated on exam today. On GDMT per cardiology.      3. Dysphagia: GI following      4. Afib: on coumadin         Thank you for allowing me to participate in this patient's care. Please feel free to call me with any questions or concerns.     Raman Villavicencio  7/15/2025             [1]   Current Facility-Administered Medications   Medication Dose Route Frequency    piperacillin-tazobactam (Zosyn) 3.375g in D5W 100mL IVPB-HAYDE  3.375 g Intravenous q12h    linezolid (Zyvox) 600 mg/300mL IVPB premix 600 mg  600 mg Intravenous Q12H    [Held by provider] guaiFENesin ER (Mucinex) 12 hr tab 600 mg  600 mg Oral BID    [Held by provider] torsemide (Demadex) tab 20 mg  20 mg Oral BID (Diuretic)    [Held by provider] sildenafil (Revatio) tab 10 mg  10 mg Oral TID    [Held by provider] carvedilol (Coreg) tab 12.5 mg  12.5 mg Oral BID with meals    sucralfate (Carafate) 1 GM/10ML oral suspension 1 g  1 g Oral TID AC and HS (2200)    [Held by provider] digoxin (Lanoxin) tab 125 mcg  125 mcg Oral Daily     Facility-Administered Medications Ordered in Other Encounters   Medication Dose Route Frequency    acetaminophen (Tylenol) tab 650 mg  650 mg Oral Q6H PRN   [2]   Current Facility-Administered Medications   Medication Dose Route Frequency    piperacillin-tazobactam (Zosyn) 3.375g in D5W 100mL IVPB-HAYDE  3.375 g Intravenous q12h    linezolid (Zyvox) 600 mg/300mL IVPB premix 600 mg  600 mg Intravenous Q12H    [Held by provider] guaiFENesin ER (Mucinex) 12 hr tab 600 mg  600 mg Oral BID    [Held by provider] torsemide (Demadex) tab 20 mg  20 mg Oral BID (Diuretic)    [Held by provider] sildenafil (Revatio) tab 10 mg  10 mg Oral TID    [Held by provider] carvedilol (Coreg) tab 12.5 mg  12.5 mg Oral BID with meals    sucralfate (Carafate) 1  GM/10ML oral suspension 1 g  1 g Oral TID AC and HS (2200)    [Held by provider] digoxin (Lanoxin) tab 125 mcg  125 mcg Oral Daily     Facility-Administered Medications Ordered in Other Encounters   Medication Dose Route Frequency    acetaminophen (Tylenol) tab 650 mg  650 mg Oral Q6H PRN

## 2025-07-15 NOTE — TRANSFER CENTER NOTE
[facility]: Accepted Dr. Kraus, awaiting bed  [facility]: Transfer hold  [facility]: Transfer hold  [facility]: Declined, does not accept insurance  [facility]: Accepted Dr. Jacobson, awaiting bed  [facility]: Declined, does not accept insurance    0750: spoke with Madeleine at [facility]. Patient remains accepted, just awaiting a S-ICU bed.   0900: spoke with Alona at [facility], they are on transfer hold  0905: Spoke with Alla [facility], info left, she will call back  0915: spoke with Cierra at [facility]: They remain on transfer hold  1200: Spoke with Madeleine [facility]. Still awaiting bed availability. Dr. Jacobson paged to call Dr. Pearce for updates  1220: Spoke with Kassandra at [facility] they remain on transfer hold  1225: Spoke with Danial at [facility]. They remain on transfer hold  1230: Spoke with Alla at [facility]. Info given, face sheet and clinical packet faxed, pending  1300: Spoke with Julio at [facility]. Info given, pending  1330: Declined by [facility]. Insurance not accepted  1400: Accepted at [facility] by Dr. Kraus, no beds available.   1630: Patient accepted to [facility] Room 804  Report to [phone]  [facility] to arrive at 1645  PCS completed  RN to send copy of chart and imaging discs

## 2025-07-15 NOTE — PROGRESS NOTES
[facility] Pulmonary Progress Note    Chief Complaint:   Shortness of breath and dysphagia    Subjective:  Patient is waiting for a bed at a tertiary care center.  She is overall stable this morning.  She is resting comfortably in the bed.  She reports that she does have pain in the neck and upper chest area especially when coughing.    Review of system: 12 point review of system negative except for what is in subjective      Past Medical History:   [Past Medical History]     [Past Medical History]   Arrhythmia    Atrial fibrillation (HCC)    Congestive heart disease (HCC)    Dysphagia    High blood pressure    High cholesterol    Renal disorder    Unspecified essential hypertension       Past Surgical History:   [Past Surgical History]    [Past Surgical History]  Procedure Laterality Date    Hernia surgery      2024       Family Medical History: [Family History]     [Family History]  Problem Relation Age of Onset    Heart Disorder Father     Cancer Sister         leukemia    Cancer Brother         prostate cancer       Social History:   Social History     Socioeconomic History    Marital status: Single     Spouse name: Not on file    Number of children: Not on file    Years of education: Not on file    Highest education level: Not on file   Occupational History    Not on file   Tobacco Use    Smoking status: Former     Current packs/day: 0.00     Average packs/day: 0.1 packs/day for 15.0 years (1.5 ttl pk-yrs)     Types: Cigarettes     Start date: 1979     Quit date: 1994     Years since quittin.5    Smokeless tobacco: Never   Vaping Use    Vaping status: Never Used   Substance and Sexual Activity    Alcohol use: Yes     Comment: 2-3 drinks/week    Drug use: No    Sexual activity: Not on file   Other Topics Concern    Not on file   Social History Narrative    Not on file     Social Drivers of Health     Food Insecurity: No Food Insecurity (2025)    NCSS - Food Insecurity     Worried About Running Out  [FreeTextEntry1] : Labs of Food in the Last Year: No     Ran Out of Food in the Last Year: No   Transportation Needs: No Transportation Needs (7/9/2025)    NCSS - Transportation     Lack of Transportation: No   Stress: Not on file   Housing Stability: Not At Risk (7/9/2025)    NCSS - Housing/Utilities     Has Housing: Yes     Worried About Losing Housing: No     Unable to Get Utilities: No        Medications: [Current Medications]    [Current Medications]  Current Outpatient Medications   Medication Sig Dispense Refill    sucralfate 1 GM/10ML Oral Suspension Take 10 mL (1 g total) by mouth 4 (four) times daily before meals and nightly (2200) for 7 days. 280 mL 0       Review of Systems: Review of Systems     Physical Exam:  /56 (BP Location: Right arm)   Pulse 62   Temp 97.6 °F (36.4 °C) (Oral)   Resp 22   Ht 5' 8\" (1.727 m)   Wt 188 lb 4.4 oz (85.4 kg)   LMP  (LMP Unknown)   SpO2 92%   BMI 28.63 kg/m²      Constitutional: alert, cooperative. No acute distress.  HEENT: Head NC/AT. Nares normal. Septum midline. Mucosa normal. No drainage or sinus tenderness.  Cardio: Regular rate and rhythm. Normal S1 and S2. No murmurs.   Respiratory: Thorax symmetrical with no labored breathing. clear to auscultation bilaterally  Extremities: No clubbing or cyanosis. No BLE edema.    Neurologic: A&Ox3. No gross motor deficits.  Skin: Warm, dry  Psych: Calm, cooperative. Pleasant affect.    Results:  Images personally reviewed - reading is my own personal review       PFTs:       No data to display                   No data to display                    WBC: 17.3, done on 7/13/2025.  HGB: 11.9, done on 7/13/2025.  PLT: 385, done on 7/13/2025.     Glucose: 115, done on 7/14/2025.  Cr: 2.62, done on 7/14/2025.  Last eGFR was 18 on 7/14/2025.  CA: 9.8, done on 7/14/2025.  Na: 139, done on 7/14/2025.  K: 4.1, done on 7/14/2025.  Cl: 101, done on 7/14/2025.  CO2: 28, done on 7/14/2025.  Last ALB was 4.7% done on 7/10/2025.     CT CHEST  (CPT=71250)  Result Date: 7/14/2025  Conclusion: 1. There is a large gas and fluid collection in the superior right side of the mediastinum. This has mass effect on the adjacent esophagus. Is unclear if there is any communication with the esophagus itself. This may represent an infectious collection. This is of unknown etiology. There is associated significant gas extending through the thoracic inlet into the neck. A postcontrast examination would be helpful. 2. Markedly enlarged heart is noted. 3. Small right pleural effusion. 4. A pneumothorax is not identified. Critical results were discussed with nurse Moses at 12:00 p.m. on July 14, 2025 Electronically Verified and Signed by Attending Radiologist: Nolan Lipscomb MD 7/14/2025 12:01 PM Workstation: [id number]    XR CHEST PA + LAT CHEST (SKL=74244)  Result Date: 7/11/2025  CONCLUSION: Grossly enlarged cardiac shadow. Right paratracheal soft tissue density. These features appear similar to the prior exam from 2 days ago. Much of the chest is obscured by this. Improving pulmonary edema. Decreasing infiltrates. Probable basal  atelectasis. No large effusion or pneumothorax. If additional imaging needed consider chest CT. Electronically Verified and Signed by Attending Radiologist: Hemal Rodriguez MD 7/11/2025 2:06 PM Workstation: [id number]    XR UGI/ESOPHAGUS DOUBLE CONTRAST (CPT=74246)  Result Date: 7/10/2025  CONCLUSION: Posterior and leftward displacement of mid and distal thoracic esophagus secondary to enlarged left atrium. Diminished primary esophageal peristalsis. No esophageal stricture. Electronically Verified and Signed by Attending Radiologist: Carlos Anderson MD 7/10/2025 12:40 PM Workstation: [id number]    XR CHEST AP PORTABLE  (CPT=71045)  Result Date: 7/9/2025  CONCLUSION: Cardiomegaly with mild pulmonary vascular congestion, increased interstitial markings the lungs, patchy groundglass opacity in the lower lungs is concerning for congestive failure  with pulmonary edema. There is patchy consolidation in the right lower lobe with superimposed pneumonia not excluded. Clinical correlation recommended. Trace bilateral pleural effusions. Electronically Verified and Signed by Attending Radiologist: Twin Rivera MD 7/9/2025 10:16 PM Workstation: [id number]       Assessment:  Large mediastinal gas and fluid collection, likely mediastinal abscess   Possible pharyngeal perforation  Sepsis likely secondary to mediastinal infectious process  Severe mitral regurgitation, sp SRAVANTHI attempt on 7/9/2025  Chronic pulmonary hypertension  Chronic diastolic heart failure  Coagulopathy due to Coumadin  Chronic atrial fibrillation  YOEL on CKD III  Dysphagia    Plan  Cardiothoracic surgery consulted for evaluation of mediastinal abscess  ENT consulted for evaluation for possible pharyngeal perforation, plan for laryngoscopy today  After detailed discussion with ENT and CTS, plan is to transfer the patient to a tertiary care institution for surgical management.  Continue broad-spectrum antibiotic therapy with linezolid and Zosyn  May consider starting partial parenteral nutrition in the interim    Thank you for consulting Pulmonary, I will continue to follow the patient.  Please call [phone] with any questions or concerns.       Venita Hirsch MD  7/14/2025

## 2025-07-15 NOTE — CONSULTS
INFECTIOUS DISEASE CONSULTATION    Lizzy Schwab Patient Status:  Inpatient    1945 MRN [id number]   [facility] 2NE-A Attending Harpal Vo MD   Hosp Day # 6 PCP Harpal Vo MD       Requested by Dr. Singh    Reason for Consultation:  Antibiotic coverage for mediastinal abscess    History of Present Illness:  Lizzy Schwab is a a(n) 79 year old female diagnosed with severe MR and TR.  She underwent attempted SRAVANTHI on .  The SRAVANTHI could not be completed due to agitation despite conscious sedation. Subsequently she complained of of neck pain and trouble swallowing.  An UGI did not identify a perforation.  CT performed on  shows a large gas and fluid collection along the mediastinum.    Blood cultures were collected on 7/10    She had received Ceftriaxone and doxyxycline from 7/10 to , Pip/calvin from , and linezolid from       History:  Past Medical History[1]  Past Surgical History[2]  Family History[3]   reports that she quit smoking about 31 years ago. Her smoking use included cigarettes. She started smoking about 46 years ago. She has a 1.5 pack-year smoking history. She has never used smokeless tobacco. She reports current alcohol use. She reports that she does not use drugs.      Allergies:  Allergies[4]    Medications:  Current Hospital Medications[5]  Medications Ordered Prior to Encounter[6]    Review of Systems:    A comprehensive 10 point review of systems was completed.  Pertinent positives and negatives noted in the the HPI.      Physical Exam:    Vital signs: Temp:  [97.6 °F (36.4 °C)-98.7 °F (37.1 °C)] 98.7 °F (37.1 °C)  Pulse:  [62-95] 75  Resp:  [16-23] 16  BP: (109-139)/(56-80) 127/80  SpO2:  [92 %-95 %] 95 %  Body mass index is 28.56 kg/m².  HEENT: no swelling  Neck: No swelling, no masses  Respiratory: Non labored, symmetric exursion  Abdomen: Soft, nontender, nondistended.    Musculoskeletal: Full range of  motion of all extremities.  No swelling noted.  Joints: no effusions  Skin: No lesions. No erythema, no open wounds    Laboratory Data:  Laboratory data reviewed      Recent Labs   Lab 07/15/25  1040   RBC 4.80   HGB 12.9   HCT 40.3   MCV 84.0   MCH 26.9   MCHC 32.0   RDW 14.8   NEPRELIM 12.49*   WBC 15.6*   .0       Recent Labs   Lab 07/09/25  1958 07/10/25  0627 07/10/25  1810 07/11/25  0636 07/13/25  0747 07/14/25  0619 07/15/25  0706   *   < > 121*   < > 123* 115* 117*   BUN 21   < > 25*   < > 73* 74* 77*   CREATSERUM 1.47*   < > 2.01*   < > 3.44* 2.62* 1.99*   CA 9.7   < > 9.9   < > 9.6 9.8 9.9   ALB 4.5  --  4.7  --   --   --   --       < > 138   < > 140 139 140   K 4.6   < > 4.4   < > 4.2 4.1 3.9      < > 102   < > 99 101 101   CO2 34.0*   < > 33.0*   < > 34.0* 28.0 32.0   ALKPHO 80  --  70  --   --   --   --    AST 27  --  32  --   --   --   --    ALT 10  --  10  --   --   --   --    BILT 1.0  --  0.6  --   --   --   --    TP 8.0  --  8.4*  --   --   --   --     < > = values in this interval not displayed.         Lab Results   Component Value Date    INR 1.79 07/15/2025    PTP 21.0 07/15/2025         Microbiologic Data:   Hospital Encounter on 07/09/25   1. Blood Culture     Status: None (Preliminary result)    Collection Time: 07/10/25  9:05 AM    Specimen: Blood,peripheral   Result Value Ref Range    Blood Culture Result No Growth 4 Days N/A         Established Problem list:  Problem List[7]    ASSESSMENT/PLAN:  1. Mediastinal abscess  Admitted on 7/9 with dysphagia following attempted SRAVANTHI  Concern for esophageal injury  UGI did not identify a peroration    -Thorac surgery following and planning transfer to     Can continue Zosyn and fluconazole  Hold off on zyvox and check nares MRSA PCR            Mauricio Ndiaye MD, MD [facility]  [phone]         [1]   Past Medical History:   Arrhythmia    Atrial fibrillation (HCC)    Congestive heart  disease (HCC)    Dysphagia    High blood pressure    High cholesterol    Renal disorder    Unspecified essential hypertension   [2]   Past Surgical History:  Procedure Laterality Date    Hernia surgery      9/2024   [3]   Family History  Problem Relation Age of Onset    Heart Disorder Father     Cancer Sister         leukemia    Cancer Brother         prostate cancer   [4] No Known Allergies  [5]   Current Facility-Administered Medications:     [START ON 7/16/2025] torsemide (Demadex) tab 20 mg, 20 mg, Oral, Daily    piperacillin-tazobactam (Zosyn) 3.375g in D5W 100mL IVPB-HAYDE, 3.375 g, Intravenous, q12h    linezolid (Zyvox) 600 mg/300mL IVPB premix 600 mg, 600 mg, Intravenous, Q12H    [Held by provider] guaiFENesin ER (Mucinex) 12 hr tab 600 mg, 600 mg, Oral, BID    [Held by provider] sildenafil (Revatio) tab 10 mg, 10 mg, Oral, TID    [Held by provider] carvedilol (Coreg) tab 12.5 mg, 12.5 mg, Oral, BID with meals    sucralfate (Carafate) 1 GM/10ML oral suspension 1 g, 1 g, Oral, TID AC and HS (2200)    [Held by provider] digoxin (Lanoxin) tab 125 mcg, 125 mcg, Oral, Daily    Facility-Administered Medications Ordered in Other Encounters:     acetaminophen (Tylenol) tab 650 mg, 650 mg, Oral, Q6H PRN  [6]   Current Facility-Administered Medications on File Prior to Encounter   Medication Dose Route Frequency Provider Last Rate Last Admin    acetaminophen (Tylenol) tab 650 mg  650 mg Oral Q6H PRN Domingo Nair MD   650 mg at 07/09/25 1325     Current Outpatient Medications on File Prior to Encounter   Medication Sig Dispense Refill    lisinopril 20 MG Oral Tab Take 1 tablet (20 mg total) by mouth daily. (Patient taking differently: Take 0.5 tablets (10 mg total) by mouth in the morning.) 30 tablet 3    torsemide 20 MG Oral Tab Take 1 tablet (20 mg total) by mouth daily. 30 tablet 3    Sildenafil Citrate 20 MG Oral Tab Take 1 tablet (20 mg total) by mouth 3 (three) times daily. 90 tablet 3    Warfarin Sodium  (COUMADIN) 2 MG Oral Tab Take 1 tablet (2 mg total) by mouth nightly. (Patient taking differently: Take 1 tablet (2 mg total) by mouth nightly. Takes 2 mg on Tuesdays and Thursdays) 30 tablet 3    carvedilol (COREG) 25 MG Oral Tab Take 6.25 mg by mouth in the morning and 6.25 mg in the evening. Take with meals.      digoxin 0.125 MG Oral Tab Take 1 tablet (125 mcg total) by mouth in the morning.      warfarin 4 MG Oral Tab Take 1 tablet (4 mg total) by mouth nightly. Takes Sun, Mon, Wed, Fri, Sat     [7]   Patient Active Problem List  Diagnosis    Atrial fibrillation (HCC)    Hypertension    Obesity    Hyponatremia    Hyperglycemia    Acute on chronic congestive heart failure (HCC)    Coagulopathy (HCC)    Acute on chronic congestive heart failure, unspecified heart failure type (HCC)    Supratherapeutic INR    Pulmonary hypertension (HCC)    YOEL (acute kidney injury)    Stage 3a chronic kidney disease (HCC)    Apnea    Snoring    Iron deficiency anemia secondary to inadequate dietary iron intake    Dyspnea on exertion    Dysphagia    Chronic congestive heart failure (HCC)    Acute pharyngitis    Anterior cervical lymphadenopathy    Pneumonia of right lower lobe due to infectious organism    Stage 3 chronic kidney disease (HCC)    Acute on chronic heart failure with preserved ejection fraction (HCC)    Acute kidney injury    Mitral valve insufficiency    Tricuspid valve insufficiency    Acute tubular necrosis    Pneumonia of right lung due to infectious organism    Mediastinal abscess (HCC)

## 2025-07-15 NOTE — PLAN OF CARE
Patient is aox3, fatigue, difficulty swallowing, denies pain but states throat area is uncomfortable. Strict NPO.  Yankeur given to daughter to suction mouth not passed teeth.    2l nc, afib controlled on coumadin INR 1/79. +BS, No edema.   Antibiotics.  Dr Ndiaye consulted. Zosyn and flucanozole  Dr Alanis scoped patients esophagus, trachea is normal,No perforation seen.  RR before morphine 36, after morphine RR24 for right lateral and below right breast pain.  Awaiting bed at [facility] for surgery.  Dr Pearce stated patient is accepted at [facility] but need to wait for a bed to be available.  Daughter at bedside. Patient currently sleeping.  1600 Received a call from Madeleine from [facility] transfer center at [phone] and they have a bed available at [facility].  Report called to [place] at [phone].  No further questions. CD sent with patient via ambulance.   All disciplines aware of transfer to [facility].Daughters walked out with patient and ambulance. Belongings with family.      Problem: PAIN - ADULT  Goal: Verbalizes/displays adequate comfort level or patient's stated pain goal  Description: INTERVENTIONS:  - Encourage pt to monitor pain and request assistance  - Assess pain using appropriate pain scale  - Administer analgesics based on type and severity of pain and evaluate response  - Implement non-pharmacological measures as appropriate and evaluate response  - Consider cultural and social influences on pain and pain management  - Manage/alleviate anxiety  - Utilize distraction and/or relaxation techniques  - Monitor for opioid side effects  - Notify MD/LIP if interventions unsuccessful or patient reports new pain  - Anticipate increased pain with activity and pre-medicate as appropriate  7/15/2025 1017 by Lisbeth Frost, RN  Outcome: Adequate for Discharge  7/15/2025 1017 by Lisbeth Frost, RN  Outcome: Progressing     Problem: CARDIOVASCULAR - ADULT  Goal:  Maintains optimal cardiac output and hemodynamic stability  Description: INTERVENTIONS:  - Monitor vital signs, rhythm, and trends  - Monitor for bleeding, hypotension and signs of decreased cardiac output  - Evaluate effectiveness of vasoactive medications to optimize hemodynamic stability  - Monitor arterial and/or venous puncture sites for bleeding and/or hematoma  - Assess quality of pulses, skin color and temperature  - Assess for signs of decreased coronary artery perfusion - ex. Angina  - Evaluate fluid balance, assess for edema, trend weights  7/15/2025 1017 by Lisbeth Frost, RN  Outcome: Adequate for Discharge  7/15/2025 1017 by Lisbeth Frost, RN  Outcome: Progressing  Goal: Absence of cardiac arrhythmias or at baseline  Description: INTERVENTIONS:  - Continuous cardiac monitoring, monitor vital signs, obtain 12 lead EKG if indicated  - Evaluate effectiveness of antiarrhythmic and heart rate control medications as ordered  - Initiate emergency measures for life threatening arrhythmias  - Monitor electrolytes and administer replacement therapy as ordered  7/15/2025 1017 by Lisbeth Frost, RN  Outcome: Adequate for Discharge  7/15/2025 1017 by Lisbeth Frost, RN  Outcome: Progressing     Problem: RESPIRATORY - ADULT  Goal: Achieves optimal ventilation and oxygenation  Description: INTERVENTIONS:  - Assess for changes in respiratory status  - Assess for changes in mentation and behavior  - Position to facilitate oxygenation and minimize respiratory effort  - Oxygen supplementation based on oxygen saturation or ABGs  - Provide Smoking Cessation handout, if applicable  - Encourage broncho-pulmonary hygiene including cough, deep breathe, Incentive Spirometry  - Assess the need for suctioning and perform as needed  - Assess and instruct to report SOB or any respiratory difficulty  - Respiratory Therapy support as indicated  - Manage/alleviate anxiety  - Monitor for signs/symptoms of CO2 retention  7/15/2025 1017 by Santosh  Lisbeth LIND RN  Outcome: Adequate for Discharge  7/15/2025 1017 by Lisbeth Frost, RN  Outcome: Progressing     Problem: GENITOURINARY - ADULT  Goal: Absence of urinary retention  Description: INTERVENTIONS:  - Assess patient’s ability to void and empty bladder  - Monitor intake/output and perform bladder scan as needed  - Follow urinary retention protocol/standard of care  - Consider collaborating with pharmacy to review patient's medication profile  - Implement strategies to promote bladder emptying  7/15/2025 1017 by Lisbeth Frost, RN  Outcome: Adequate for Discharge  7/15/2025 1017 by Lisbeth Frost, RN  Outcome: Progressing     Problem: METABOLIC/FLUID AND ELECTROLYTES - ADULT  Goal: Electrolytes maintained within normal limits  Description: INTERVENTIONS:  - Monitor labs and rhythm and assess patient for signs and symptoms of electrolyte imbalances  - Administer electrolyte replacement as ordered  - Monitor response to electrolyte replacements, including rhythm and repeat lab results as appropriate  - Fluid restriction as ordered  - Instruct patient on fluid and nutrition restrictions as appropriate  7/15/2025 1017 by Lisbeth Frost, CHAPARRITA  Outcome: Adequate for Discharge  7/15/2025 1017 by Lisbeth Frost RN  Outcome: Progressing  Goal: Hemodynamic stability and optimal renal function maintained  Description: INTERVENTIONS:  - Monitor labs and assess for signs and symptoms of volume excess or deficit  - Monitor intake, output and patient weight  - Monitor urine specific gravity, serum osmolarity and serum sodium as indicated or ordered  - Monitor response to interventions for patient's volume status, including labs, urine output, blood pressure (other measures as available)  - Encourage oral intake as appropriate  - Instruct patient on fluid and nutrition restrictions as appropriate  7/15/2025 1017 by Lisbeth Frost, RN  Outcome: Adequate for Discharge  7/15/2025 1017 by Lisbeth Frost, RN  Outcome: Progressing

## 2025-07-15 NOTE — CONSULTS
[facility]   part of [facility]    Report of Consultation    Date of Consult: 7/15/2025    Reason for Consultation:   Sore throat    History of Present Illness:   Patient is a 79 year old female who is being seen for sore throat.  She complains of sore throat.  She had scan which showed probable mediastinal abscess.    Past Medical History  Past Medical History[1]    Past Surgical History  Past Surgical History[2]    Family History  Family History[3]    Social History  Pediatric History   Patient Parents    Not on file     Other Topics Concern    Not on file   Social History Narrative    Not on file           Current Medications:  Current Medications[4]    Allergies  Allergies[5]    Review of Systems:   A comprehensive review of systems was negative.    Physical Exam:   Blood pressure (!) 132/91, pulse 71, temperature 97.5 °F (36.4 °C), temperature source Oral, resp. rate 24, height 5' 8\" (1.727 m), weight 187 lb 13.3 oz (85.2 kg), SpO2 96%.         Constitutional Normal Overall appearance - Normal.   Psychiatric Normal Orientation - Oriented to time, place, person & situation. Appropriate mood and affect.   Head/Face Normal Facial features -- Normal. Skull - Normal.   Eyes Normal Pupils equal ,round ,react to light and accomidate   Ears Normal External Ear Right: Normal, Left: Normal. Canal - Right: Normal, Left: Normal. TM - Right: Normal, Left: Normal.   Nose Normal External Nose, Normal, Septum -Midline, Right, Left Turbinates - Right: Normal, Hypertrophic Left: Normal, Hypertrophic   Mouth/Throat Normal Lips/teeth/gums - Normal. Tonsils - Normal. Oropharynx - Normal.   Neck Exam Normal Inspection -there is some mild swelling of the neck.  It is worse on the right side.  I do not palpate any fluctuance or crepitance.  There is some mild tenderness associated right greater than left.  Palpation - Normal. Parotid gland - Normal. Thyroid gland - Normal.   Neurological Normal Memory - Normal. Cranial  nerves - Cranial nerves II through XII grossly intact.   Nasopharynx Normal  Normal        Skin Normal Inspection - Normal.        Lymph Detail Normal Submental. Submandibular. Anterior cervical. Posterior cervical. Supraclavicular.     Flexible Laryngoscopy Procedure Note (40464)    Due to inability for adequate examination of the larynx and need for magnification to perform the examination, endoscopy was performed.  Risks and benefits were discussed with patient/family and they have given verbal consent to proceed.    Pre-operative Diagnosis:   1. Mitral regurgitation    2. Tricuspid regurgitation        Post-operative Diagnosis: Same    Procedure: Diagnostic flexible fiberoptic laryngoscopy    Anesthesia: topical lidocaine    Surgeon: Shaggy Interventional    EBL: 0    Procedure Detail & Findings: The scope was passed through the right nares.  Nose and nasopharynx are clear.  Base of tongue epiglottis and vocal cords were normal with normal mobility and no swelling.  There was some erythema of the posterior pharyngeal wall.  There is mild swelling on the right compared to the left.    Condition: Stable    Complications: Patient tolerated the procedure well with no immediate complications.      Nile Alanis MD     Results:     Laboratory Data:  Lab Results   Component Value Date    WBC 15.6 (H) 07/15/2025    HGB 12.9 07/15/2025    HCT 40.3 07/15/2025    .0 07/15/2025    CREATSERUM 1.99 (H) 07/15/2025    BUN 77 (H) 07/15/2025     07/15/2025    K 3.9 07/15/2025     07/15/2025    CO2 32.0 07/15/2025     (H) 07/15/2025    CA 9.9 07/15/2025    ALB 4.7 07/10/2025    ALKPHO 70 07/10/2025    TP 8.4 (H) 07/10/2025    AST 32 07/10/2025    ALT 10 07/10/2025    PTT 52.2 (H) 04/13/2021    INR 1.79 (H) 07/15/2025    PTP 21.0 (H) 07/15/2025    T4F 1.3 04/14/2021    TSH 2.690 04/15/2021    MG 2.1 04/21/2021    TROP <0.045 04/13/2021         Imaging:  CT CHEST (CPT=71250)  Result Date:  7/14/2025  PROCEDURE: CT CHEST (CPT=71250) INDICATIONS: hypoxia, elevated procal, possible infiltrate on CXR PATIENT STATED HISTORY: Patient is here for hypoxia, abnormal cxr. COMPARISON: July 11, 2025 chest x-ray upper GI examination from July 10, 2025 TECHNIQUE: CT images of the chest were obtained without contrast. Dose reduction techniques were used. Dose information is transmitted to the ACR (American College of Radiology) NRDR (National Radiology Data Registry) which includes the Dose Index Registry. FINDINGS: LUNGS: Examination is limited due to lack of intravenous contrast, however there is a large gas and fluid collection in the superior mediastinum that is compressing the esophagus. It is unclear if there is communication with the esophagus. This collection measures approximately 9.1 x 6.3 cm in the axial plane. This is approximately 7.1 cm craniocaudally. This is mostly in the superior right side of the mediastinum and has significant mass effect with atelectasis on the right lung. There is associated significant subcutaneous air extending into the hypopharyngeal area and throughout the sublingual and submandibular area. This is only partially visualized on this exam. Small right pleural effusion is noted. VASCULATURE: Prominent size main pulmonary artery measures approximately 4.8 cm. THORACIC AORTA:  Unremerkable within the limits of a non contrast study. NIDHI:  No mass or adenopathy. MEDIASTINUM: Difficult to delineate with large gas and fluid collection. CARDIAC: Enlarged heart. PLEURA: Small right pleural effusion. CHEST WALL:  No mass or axillary adenopathy. LIMITED ABDOMEN:  Limited images of the upper abdomen are unremarkable. BONES:  No bony lesion or fracture.      Conclusion: 1. There is a large gas and fluid collection in the superior right side of the mediastinum. This has mass effect on the adjacent esophagus. Is unclear if there is any communication with the esophagus itself. This may  represent an infectious collection. This is of unknown etiology. There is associated significant gas extending through the thoracic inlet into the neck. A postcontrast examination would be helpful. 2. Markedly enlarged heart is noted. 3. Small right pleural effusion. 4. A pneumothorax is not identified. Critical results were discussed with nurse Moses at 12:00 p.m. on July 14, 2025 Electronically Verified and Signed by Attending Radiologist: Nolan Lipscomb MD 7/14/2025 12:01 PM Workstation: [id number]    XR CHEST PA + LAT CHEST (YLK=13567)  Result Date: 7/11/2025  PROCEDURE: XR CHEST PA + LAT CHEST (CPT=71046) INDICATIONS: Follow-up pneumonia PATIENT STATED HISTORY: Patient states that she recently had pneumonia. COMPARISON: 7/9/2025     CONCLUSION: Grossly enlarged cardiac shadow. Right paratracheal soft tissue density. These features appear similar to the prior exam from 2 days ago. Much of the chest is obscured by this. Improving pulmonary edema. Decreasing infiltrates. Probable basal  atelectasis. No large effusion or pneumothorax. If additional imaging needed consider chest CT. Electronically Verified and Signed by Attending Radiologist: Hemal Rodriguez MD 7/11/2025 2:06 PM Workstation: [id number]    XR UGI/ESOPHAGUS DOUBLE CONTRAST (CPT=74246)  Result Date: 7/10/2025  PROCEDURE: XR UGI/ESOPHAGUS DOUBLE CONTRAST (CPT=74246) INDICATIONS: Throat pain PATIENT STATED HISTORY: Patient states she had a procedure done yesterday where they inserted a camera down her throat and it failed. Shares she now has neck/throat pain. COMPARISON: There are no comparisons for this exam. TECHNIQUE: Single contrast upper GI and esophagram was performed with water-soluble contrast. Fluoro/Cine Image Number: 17 Fluoro Time: 2 minutes 25 seconds Radiation Dose: Ugy/m2(m squared) Technologist Time: 45 minutes FINDINGS: No abnormality is seen in the pharynx and cervical esophagus. There is diminished primary peristalsis of the thoracic  esophagus at the thoracic esophagus is displaced posteriorly below the tobias, consistent with extrinsic displacement by an enlarged left atrium. No esophageal stricture is identified. The mucosal outline of the stomach and duodenum appear normal. There is limited distention of the stomach due to small quantity of ingested material. No hiatal hernia or GE reflux was observed fluoroscopically.     CONCLUSION: Posterior and leftward displacement of mid and distal thoracic esophagus secondary to enlarged left atrium. Diminished primary esophageal peristalsis. No esophageal stricture. Electronically Verified and Signed by Attending Radiologist: Carlos Anderson MD 7/10/2025 12:40 PM Workstation: [id number]    XR CHEST AP PORTABLE  (CPT=71045)  Result Date: 7/9/2025  PROCEDURE: XR CHEST AP PORTABLE  (CPT=71045) INDICATIONS: chf COMPARISON: 4/13/2021 FINDINGS: Cardiomegaly with mild pulmonary vascular congestion, increased interstitial markings the lungs, patchy groundglass opacity in the lower lungs is concerning for congestive failure with pulmonary edema. There is patchy consolidation in the right lower lobe with superimposed pneumonia not excluded. Clinical correlation recommended. Trace bilateral pleural effusions. No pneumothorax. Degenerative change in the spine.     CONCLUSION: Cardiomegaly with mild pulmonary vascular congestion, increased interstitial markings the lungs, patchy groundglass opacity in the lower lungs is concerning for congestive failure with pulmonary edema. There is patchy consolidation in the right lower lobe with superimposed pneumonia not excluded. Clinical correlation recommended. Trace bilateral pleural effusions. Electronically Verified and Signed by Attending Radiologist: Twin Rivera MD 7/9/2025 10:16 PM Workstation: [id number]    CATH TRANSESOPHAGEAL ECHOCARDIOGRAM (SRAVANTHI)  Result Date: 7/9/2025  This exam has been completed. Please refer to Notes for the results to this procedure.    CATH  ANGIO  Result Date: 7/9/2025  This exam has been completed. Please refer to Notes for the results to this procedure.        Impression:   Mediastinal abscess.  CT scan shows gas extending up into the neck.  Complains of sore throat.  Her laryngeal airway otherwise looks good with normal vocal cord mobility and no evidence of laryngeal swelling at this time.    Recommendations:  IV antibiotics per infectious disease.  Plan is to transfer the patient for further surgical management at a tertiary care institution.  I will be available if my assistance is further needed.      Thank you for allowing me to participate in the care of your patient.      Nile Alanis MD  7/9/2025         [1]   Past Medical History:   Arrhythmia    Atrial fibrillation (HCC)    Congestive heart disease (HCC)    Dysphagia    High blood pressure    High cholesterol    Renal disorder    Unspecified essential hypertension   [2]   Past Surgical History:  Procedure Laterality Date    Hernia surgery      9/2024   [3]   Family History  Problem Relation Age of Onset    Heart Disorder Father     Cancer Sister         leukemia    Cancer Brother         prostate cancer   [4]   Current Outpatient Medications   Medication Sig Dispense Refill    sucralfate 1 GM/10ML Oral Suspension Take 10 mL (1 g total) by mouth 4 (four) times daily before meals and nightly (2200) for 7 days. 280 mL 0   [5] No Known Allergies

## 2025-07-15 NOTE — PROGRESS NOTES
[facility] Dosing Service  Warfarin (Coumadin) Subsequent Dosing    Lizzy Schwab is a 79 year old patient for whom pharmacy is dosing warfarin (Coumadin). Goal INR is 2-3    Recent Labs   Lab 07/11/25  0636 07/12/25  1552 07/13/25  0747 07/14/25  0619 07/15/25  0706   INR 3.09* 3.60* 3.58* 4.40* 1.79*     Consulted by:  Aleena GRIFFIN  Indication:  afib  Potential Drug Interactions:  ceftriaxone, doxycycline (both completing today), torsemide (currently on hold), tramadol - all can increase INR  Other Anticoagulants:  none  Home regimen (if applicable):  warfarin 2mg on Tues/Thurs and 4mg rest of week    Inpatient Dosing History:    Date 7/9 7/10 7/11 7/12 7/13 7/14 7/15   INR - 2.0 3.09 3.6 3.58 4.40 1.79   Coumadin dose 2 mg 2 mg held held held Vit K 5 mg IV             Based on above -  1.  For today, Give warfarin (COUMADIN)  3 mg at 2100 tonight  2   PT/INR ordered daily while on warfarin  3.  Pharmacy will continue to follow.  We appreciate the opportunity to assist in the care of this patient.    Karla Bertrand, PharmD  7/15/2025  2:04 PM

## 2025-07-15 NOTE — CM/SW NOTE
[facility] requested for radiology to put the ff imaging on a disc- cxr 7/9 & 7/11, ugi 7/10 and ct chest 7/14 and to call pt's RN at ext [phone] when ready to be picked up. Pt's RN aware.

## 2025-07-15 NOTE — PROGRESS NOTES
[facility]   part of [facility]     Hospitalist Progress Note     Lizzy Schwab Patient Status:  Inpatient    1945 MRN [id number]   Location [facility] 2NE-A Attending Harpal Vo MD   Hosp Day # 6 PCP Harpal Vo MD     Chief Complaint: Medical management    Subjective:     Patient seen with patient's daughters at bedside and updated both patient's daughters at bedside in detail.  Afebrile.  Has a right sided lower chest discomfort right below her right breast according to patient.  Remains on 2 L of oxygen by nasal cannula.  Has some congestion, able to bring up phlegm as reported by patient and patient's daughters at bedside.    Objective:    Review of Systems:   A comprehensive review of systems was completed; pertinent positive and negatives stated in subjective.    Vital signs:  Temp:  [97.5 °F (36.4 °C)-98.7 °F (37.1 °C)] 97.5 °F (36.4 °C)  Pulse:  [62-95] 71  Resp:  [16-36] 36  BP: (114-139)/(63-91) 132/91  SpO2:  [92 %-96 %] 96 %    Physical Exam:    BP (!) 132/91   Pulse 71   Temp 97.5 °F (36.4 °C) (Oral)   Resp (!) 36   Ht 5' 8\" (1.727 m)   Wt 187 lb 13.3 oz (85.2 kg)   LMP  (LMP Unknown)   SpO2 96%   BMI 28.56 kg/m²   On 2 L of oxygen by nasal cannula  General: No acute distress.   HEENT: Moist mucous membranes.    Neck: Right-sided neck right heart cath access site with no ecchymosis.  Respiratory: Clear to auscultation bilaterally except few scattered crackles which clears with coughing or deep breathing corresponding to patient's episode of congestion, patient able to bring up the phlegm according to patient and patient's daughters at bedside.  Cardiovascular: S1, S2.  Regular rate and rhythm.    Abdomen: Soft, nontender, nondistended.  Positive bowel sounds.   Neurologic: Awake alert oriented, no focal neurological deficits.  Musculoskeletal: Full range of motion of all extremities.  No pedal edema or calf tenderness  Psychiatric: Appropriate mood and affect.        Diagnostic Data:    Labs:  Recent Labs   Lab 07/11/25  0636 07/12/25  1552 07/13/25  0747 07/14/25  0619 07/15/25  0706 07/15/25  0906 07/15/25  1040   WBC 19.8* 18.9* 17.3*  --   --  15.8* 15.6*   HGB 12.6 12.3 11.9*  --   --  12.7 12.9   MCV 87.3 87.2 83.8  --   --  85.3 84.0   .0 403.0 385.0  --   --  414.0 403.0   BAND  --   --   --   --   --   --  4   INR 3.09* 3.60* 3.58* 4.40* 1.79*  --   --        Recent Labs   Lab 07/09/25  1958 07/10/25  0627 07/10/25  1810 07/11/25 0636 07/13/25  0747 07/14/25  0619 07/15/25  0706   *   < > 121*   < > 123* 115* 117*   BUN 21   < > 25*   < > 73* 74* 77*   CREATSERUM 1.47*   < > 2.01*   < > 3.44* 2.62* 1.99*   CA 9.7   < > 9.9   < > 9.6 9.8 9.9   ALB 4.5  --  4.7  --   --   --   --       < > 138   < > 140 139 140   K 4.6   < > 4.4   < > 4.2 4.1 3.9      < > 102   < > 99 101 101   CO2 34.0*   < > 33.0*   < > 34.0* 28.0 32.0   ALKPHO 80  --  70  --   --   --   --    AST 27  --  32  --   --   --   --    ALT 10  --  10  --   --   --   --    BILT 1.0  --  0.6  --   --   --   --    TP 8.0  --  8.4*  --   --   --   --     < > = values in this interval not displayed.       Estimated Glomerular Filtration Rate: 25 mL/min/1.73m2 (A) (result from lab).    No results for input(s): \"TROP\", \"TROPHS\", \"CK\" in the last 168 hours.    Recent Labs   Lab 07/13/25  0747 07/14/25  0619 07/15/25  0706   PTP 36.0* 42.3* 21.0*   INR 3.58* 4.40* 1.79*                  Microbiology    Hospital Encounter on 07/09/25   1. Blood Culture     Status: None (Preliminary result)    Collection Time: 07/10/25  9:05 AM    Specimen: Blood,peripheral   Result Value Ref Range    Blood Culture Result No Growth 4 Days N/A         Imaging: Reviewed in Epic.  Chest x-ray done on 7/9/2025 night results reviewed as below  XR CHEST AP PORTABLE  (CPT=71045)    INDICATIONS: chf    COMPARISON: 4/13/2021    FINDINGS:  Cardiomegaly with mild pulmonary vascular congestion, increased interstitial  markings the lungs, patchy groundglass opacity in the lower lungs is concerning for congestive failure with pulmonary edema. There is patchy consolidation in the right lower  lobe with superimposed pneumonia not excluded. Clinical correlation recommended. Trace bilateral pleural effusions. No pneumothorax. Degenerative change in the spine.    Impression   CONCLUSION:    Cardiomegaly with mild pulmonary vascular congestion, increased interstitial markings the lungs, patchy groundglass opacity in the lower lungs is concerning for congestive failure with pulmonary edema. There is patchy consolidation in the right lower  lobe with superimposed pneumonia not excluded. Clinical correlation recommended. Trace bilateral pleural effusions.      Addendum-x-ray esophagram ordered by GI done on 7/10/2025 resulted as below  XR UGI/ESOPHAGUS DOUBLE CONTRAST (CPT=74246)    INDICATIONS: Throat pain    PATIENT STATED HISTORY: Patient states she had a procedure done yesterday where they inserted a camera down her throat and it failed. Shares she now has neck/throat pain.    COMPARISON: There are no comparisons for this exam.    TECHNIQUE: Single contrast upper GI and esophagram was performed with water-soluble contrast.    Fluoro/Cine Image Number: [id number]  Fluoro Time: 2 minutes 25 seconds  Radiation Dose: Ugy/m2(m squared)  Technologist Time: 45 minutes    FINDINGS:    No abnormality is seen in the pharynx and cervical esophagus. There is diminished primary peristalsis of the thoracic esophagus at the thoracic esophagus is displaced posteriorly below the tobias, consistent with extrinsic displacement by an enlarged  left atrium. No esophageal stricture is identified. The mucosal outline of the stomach and duodenum appear normal. There is limited distention of the stomach due to small quantity of ingested material. No hiatal hernia or GE reflux was observed  fluoroscopically.      Impression   CONCLUSION:    Posterior and leftward  displacement of mid and distal thoracic esophagus secondary to enlarged left atrium. Diminished primary esophageal peristalsis. No esophageal stricture.     Medications:    [START ON 7/16/2025] torsemide  20 mg Oral Daily    piperacillin-tazobactam  3.375 g Intravenous q12h    [Held by provider] guaiFENesin ER  600 mg Oral BID    [Held by provider] sildenafil  10 mg Oral TID    [Held by provider] carvedilol  12.5 mg Oral BID with meals    sucralfate  1 g Oral TID AC and HS (2200)    [Held by provider] digoxin  125 mcg Oral Daily       Assessment & Plan:    Admitted for observation by cardiology status post cardiac angiogram right heart cath And status post attempted SRAVANTHI-managed by cardiology     #Transient dysphagia and sore throat possibly due to acute pharyngitis with anterior cervical tender lymphadenopathy  #Right lower lobe pneumonia seen on chest x-ray  #Leukocytosis due to above  # Large gas and fluid collection in superior right side of the mediastinum with mass effect on the adjacent esophagus, due to 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  No pharyngeal erythema.  Denies any heartburns.  White count slowly improving, procalcitonin level improving.  Continue IV antibiotics-currently on IV Zosyn   follow CBC in a.m.  Procalcitonin elevated to 11.21 on admission, improving with IV antibiotics to 1.61 on 7/15/2025.  Blood culture and throat culture and sputum culture ordered  Respiratory panel flu panel negative  Pulmonary consult appreciated  CT chest done on 7/14/2025 showed large gas and fluid collection in superior right side of the mediastinum with mass effect on adjacent esophagus.  Associated gas extending through thoracic inlet into neck.  Marked enlarged heart.  Small right pleural effusion.  No pneumothorax identified.   Cardiothoracic surgery consulted by pulmonary who also requested ENT consult.  Informed ENT Dr. Alanis  regarding the consult on 7/14/2025 by myself, pulmonary and cardio thoracic surgery.  Had discussed with CT surgery Dr. Berry, ENT on-call Dr. Alanis, pulmonary Venita Hirsch MD  together as a team on 7/14/2025.  CT surgeon plans transfer to tertiary care center.  Transfer center has been working with different tertiary care centers regarding transfer, patient is out of network with [place] per transfer center.  Awaiting bed availability at [facility]  per transfer center.  Also consulted ID and discussed with Dr. Ndiaye  #Hypertension  Follow-up blood pressure  Patient on Coreg at home which we will plan to continue  #Atrial fibrillation  Monitor on telemetry  On chart review patient on Coreg, digoxin  Hold home warfarin since 7/11/2025 due to supratherapeutic INR and cardiology following, was given 1 dose of vitamin K on 7/14/2025 by cardiology as INR supratherapeutic and patient may need CV surgery intervention, INR improved on 7/15/2025 to 1.79 with this  #Acute on chronic preserved ejection fraction CHF, chronic valvular heart disease with severe mitral regurgitation and tricuspid regurgitation  Cardiology following  Cardiac medications per cardiology.  On chart review patient on Coreg, lisinopril, digoxin  Chest x-ray done on 7/9/2025 with cardiomegaly with mild pulmonary vascular congestion and increased interstitial opacity in lower lungs concerning for CHF with pulmonary edema.  Also with patchy consolidation right lower lobe with superimposed pneumonia not excluded.  Status post IV Bumex per cardiology given on 7/9/2025  Status post IV Lasix 80 mg IV daily started by cardiology on 7/9/2025 which was held from 7/11/2025.  Last dose of IV Lasix was on evening of 7/10/2025.  Patient received torsemide on 7/11/2025, which was held on 7/12/2025 as creatinine increasing.  Daily weight  I/O chart  CHF protocol  # Acute kidney injury on CKD stage III  this was discussed with patient and  patient's daughter at bedside and looked at old labs in Care Everywhere from 2021 and 2024 and reviewed this with patient and daughter  Creatinine improving today, nephrology on consult-was holding all diuretics and lisinopril at this time per nephrology.  Nephrology is planning to resume torsemide today.  Follow BMP in a.m.  #Diarrhea possibly related to the Gastrografin patient received for the x-ray upper GI esophagus double contrast study  Checked stool C. difficile which came back negative  Symptomatic management  Probiotic while on antibiotics    Discussed with patient  Discussed with RN.  Discussed with patient's daughters at bedside and went over all test results including labs and imaging and updated regarding patient and specialist recommendations and plan of care.  Patient and patient's daughter aware of CT surgery recommendation of transfer to tertiary care center and that transfer center working with tertiary care centers regarding transfer and awaiting bed availability at the [facility]    Dr. Sanchez will follow from morning tomorrow if  patient here overnight    Cammy Singh MD    Supplementary Documentation:     Quality:  DVT Mechanical Prophylaxis:        DVT Pharmacologic Prophylaxis   Medication   None                Code Status: Full Code  Bridges: No urinary catheter in place  Bridges Duration (in days):   Central line:    JARED:     Discharge is dependent on: Clinical progress  At this point Ms. Schwab is expected to be discharge to: Home     The 21st Century Cures Act makes medical notes like these available to patients in the interest of transparency. Please be advised this is a medical document. Medical documents are intended to carry relevant information, facts as evident, and the clinical opinion of the practitioner. The medical note is intended as peer to peer communication and may appear blunt or direct. It is written in medical language and may contain abbreviations or verbiage  that are unfamiliar.

## 2025-07-21 NOTE — PAYOR COMM NOTE
ADMISSION REVIEW     [facility]  Subscriber #:  [id number]  Authorization Number: [id number]    Admit date: 25  Admit time:  3:40 PM       REVIEW DOCUMENTATION:  ED Provider Notes    No notes of this type exist for this encounter.                 CARDIOLOGY:      Date of Service: 2025 11:46 AM  Procedures   HC RIGHT HEART CATHETERIZATION W O2 SAT CARD OUTPUT [[id number]]          Signed         Right Heart Catheterization     Procedures:                 Right Heart Catheterization     Procedure details:     After consent obtained from patient, patient was brought to the cardiac catheterization lab. Patient was prepped and draped in usual sterile fashion. Lidocaine 1% was used to infiltrate site of access. Access was obtained with ultrasound guidance. Ultrasound guide and needle kit utilized to access site, and advanced sheath using modified seldinger technique.      RHC was performed using swan-brayan catheter and cardiac output using RAMANA equation was also performed.  See below for data.         Indication:  VHD, HFpEF     Access Site: Right Internal Jugular Vein                            Sheath Size (Fr): 7     Complications:  None apparent.      Findings:  BP: 158/87 (map 113) mmHg  RA: 16 with deep Y descents (severe TR) mmHg  RV: 55/8 mmHg  PA: 62/29 (mean 43) mmHg  PCWP: 29 (with V waves to 41) mmHg  TP mmHg     Ramana  CO: 3.9 liters/min  CI: 2.1 liters/min/m2  PVR: 3.5 Wood units  PVRI: 7.1 Wood units/m2  SVR - 2020 dynes     Impression:     Elevated bi-ventricular filling pressures (with evidence of VHD severity as noted above)  Borderline cardiac indices  Likely mixed (but predominant post capillary pHTN)      Disposition: Discussed with patient post sheath verbal instructions. Updated family at bedside (daughter). Continue current therapy, will plan to increase diuretic at office visit to optimize hemodynamics and volume status. Has SRAVANTHI upcoming for further evaluation of  VHD.      Harpal Vo M.D.  Advanced Heart Failure and Transplant Cardiology                     Procedures     Signed     Date of Service: 7/9/2025 11:49 AM  Pre-procedure Diagnoses   Severe mitral regurgitation [I34.0]   Severe tricuspid regurgitation [I07.1]     Post-procedure Diagnoses   Severe mitral regurgitation [I34.0]   Severe tricuspid regurgitation [I07.1]     Procedures   CARD ECHO SRAVANTHI (CPT=93320/25214) [[id number]]          Signed         Cardiology Transesophageal Echo Note     PRE-PROCEDURE DIAGNOSIS: MR/TR - severe     PROCEDURE: Transesophageal Echocardiogram.     SEDATION:   Topical spray x 1  Versed: 6 mg  Fentanyl: 125 mcg     I personally supervised the intravenous administration of   conscious sedation.  This was performed with continuous   respiratory, hemodynamic, and electrocardiographic monitoring.    Sedation was supervised from 11:49 AM - 12:08 AM, a total of 19 minutes.        DESCRIPTION:   Informed consent was obtained after risks and benefits of the procedure were explained. Oral hurricaine spray was placed in the oropharynx. Conscious sedation was given.  Despite increased dosing of versed/fentanyl we could not adequate sedate the patient for passage of SRAVANTHI probe into the esophagus.       I explained to the daughter that general anesthesia is required in such instances.  Will plan to bring Mrs. Schwab back and perform SRAVANTHI with help from anesthesia colleagues.     COMPLICATIONS: none           Domingo Nair MD  7/9/2025                        [facility]  Report of Consultation       Chief Complaint: Being admitted for observation by cardiology status post cardiac angiogram right heart cath And status post attempted SRAVANTHI      History of Present Illness:  Lizzy Schwab is a  79 year old female admitted for observation by cardiology status post cardiac angiogram right heart cath And status post attempted SRAVANTHI.  Discussed with cardiology Dr. Nair regarding the consult.   Patient has some dysphagia after the attempted SRAVANTHI and SRAVANTHI could not be done as reported by cardiology Dr. Nair.  Patient  has mild pain in the right neck at the right heart cath access site, has complaints of mild right-sided chest pain.  Dysphagia present which is improving.  No nausea vomiting.  Denies any abdominal pain.  Patient seen and examined in Cath Lab holding area for the consult, patient being admitted to  per cardiology for overnight observation  Patient seen with patient's daughter at bedside.     hysical Exam:  Vital signs: Blood pressure 142/71, pulse 70, temperature 97.8 °F (36.6 °C), temperature source Temporal, resp. rate 24, height 5' 8\" (1.727 m), weight 187 lb (84.8 kg), SpO2 98%.  General: No acute distress.   HEENT: Moist mucous membranes. EOM-I. PERRL  Neck: Right-sided neck right heart cath access site in dressing and dressing looks dry.  Respiratory: Clear to auscultation bilaterally.  No wheezes. No rhonchi.  Cardiovascular: S1, S2.  Regular rate and rhythm.    Abdomen: Soft, nontender, nondistended.  Positive bowel sounds.   Neurologic: Awake alert oriented, no focal neurological deficits.  Musculoskeletal: Full range of motion of all extremities.  No pedal edema or calf tenderness  Psychiatric: Appropriate mood and affect.     Laboratory Data:  Will order CBC and BMP     ASSESSMENT / PLAN:   Admitted for observation by cardiology status post cardiac angiogram right heart cath And status post attempted SRAVANTHI-managed by cardiology     Transient dysphagia  Discussed with cardiology, will observe overnight  Patient has no crepitus on exam.  No pharyngeal erythema.  Denies any heartburns.  Hypertension  Follow-up blood pressure  Patient on Coreg at home which we will plan to continue  Atrial fibrillation  Monitor on telemetry  On chart review patient on Coreg, digoxin, warfarin at home  Chronic CHF  Cardiology following  Cardiac medications per cardiology.  On chart review patient on  Coreg, lisinopril, digoxin, torsemide  CKD stage III  this was discussed with patient and patient's daughter at bedside and looked at old labs in Care Everywhere from 2021 and 2024 and reviewed this with patient and daughter        Quality:  DVT Prophylaxis: SCD.  Early ambulation  CODE status: Full  Bridges: No     Plan of care discussed with patient, patient's daughter at bedside.  Discussed with cardiology Dr. Nair         Thank you for allowing me to participate in the care of your patient. Will continue to follow while in hospital.      Cammy Singh MD  7/9/2025          7/10    Cardiology     Assessment and Plan:  [Problem List]    [Problem List]  Patient Active Problem List      Diagnosis    Atrial fibrillation (HCC)    Hypertension    Obesity    Hyponatremia    Hyperglycemia    Acute on chronic congestive heart failure (HCC)    Coagulopathy (HCC)    Acute on chronic congestive heart failure, unspecified heart failure type (HCC)    Supratherapeutic INR    Pulmonary hypertension (HCC)    YOEL (acute kidney injury)    Stage 3a chronic kidney disease (HCC)    Apnea    Snoring    Iron deficiency anemia secondary to inadequate dietary iron intake    Dyspnea on exertion    Dysphagia    Chronic congestive heart failure (HCC)        Acute on Chronic HFpEF, stage C, NYHA 3  - secondary to VHD progressive with fluid overload signs present objectively   - continue IV diuresis this AM, and then oral diuretics on discharge (torsemide 20 BID), GDMT otherwise, and outpatient structural evaluation      Permanent AF on warfarin   - warfarin daily, INR checks, monitor on tele.      Severe MR / TR   - elevated filling pressures on RHC, with fluid overload, and diuresis well  - continue IV this AM, and then convert to oral diuretics this afternoon.      HTN   - GDMT titration.      CKD 3  - trend labs, diuresis as above.      Dysphagia with weakness  - awaiting GI evaluation. Appreciate primary and GI input     Harpal Vo  MD   Advanced Heart Failure and Transplant Cardiology           Gastroenterology Consultation-[facility]enter[facility]         Reason for consultation: failed SRAVANTHI  HPI: Ms. Schwab is an 80 yo F with A fib, CHF, who presented for cardiac angiogram and s/p attempted SRAVANTHI. Post procedure she has neck pain and a sore throat. She also notes some discomfort with breathing.  Her daughter reports that she has a strong gag reflex and in the past they have had trouble with getting SRAVANTHI probe down. They were able to get it down with general anesthesia in the past. She does not have a h/o dysphagia. She has not had a recent colonoscopy and has not had an EGD. She denies melena, hematochezia.     PE: /61 (BP Location: Left arm)   Pulse 65   Temp 97.8 °F (36.6 °C) (Oral)   Resp 20   Ht 5' 8\" (1.727 m)   Wt 188 lb 4.4 oz (85.4 kg)   LMP  (LMP Unknown)   SpO2 90%   BMI 28.63 kg/m²   Gen: AAO x 3, able to speak in complete sentences  HENT: NCAT, EOMI, PERRL, oropharynx is clear with moist mucosal membranes  Eyes: Sclerae are anicteric  Neck:  Supple without nuchal rigidity; No lymphadenopathy. No obvious crepitus  CV: Regular rate, with normal S1 and S2; +murmur  Resp: No increased respiratory effort  Abdomen: Soft, non-tender, non-distended with the presence of bowel sounds; No hepatosplenomegaly; no rebound or guarding; No ascites is clinically apparent; no tympany to percussion         Recent Labs   Lab 07/09/25  1958 07/10/25  0627   * 120*   BUN 21 26*   CREATSERUM 1.47* 1.77*   CA 9.7 9.7    140   K 4.6 4.6    99   CO2 34.0* 36.0*          Recent Labs   Lab 07/10/25  0627   RBC 4.71   HGB 12.9   HCT 41.9   MCV 89.0   MCH 27.4   MCHC 30.8*   RDW 15.0   NEPRELIM 17.77*   WBC 20.0*   .0      CXR:  Impression   CONCLUSION:    Cardiomegaly with mild pulmonary vascular congestion, increased interstitial markings the lungs, patchy groundglass opacity in the lower lungs is concerning for  congestive failure with pulmonary edema. There is patchy consolidation in the right lower  lobe with superimposed pneumonia not excluded. Clinical correlation recommended. Trace bilateral pleural effusions.      Impression:   Difficulty with passing SRAVANTHI probe   A fib on Comadin: INR 2.0  CHF  CKD  CXR with CHF and ?PNA     Recommendations:   Pt is declining EGD  Upper GI with gastrograffin to further assess anatomy  Consider Carafate to help with throat pain which is likely from SRAVANTHI probe          Thank you for the consultation, we will follow the patient with you.     Estela Keene DO  9:05 AM  7/10/2025  [facility]astroenter[facility]      Hospitalist Progress Note        Subjective:  Patient persist to have sore throat and difficulty swallowing.  Persist to have right-sided chest discomfort.  No nausea vomiting.  Afebrile.  GI planning x-ray esophagram today and patient n.p.o. for this.  Workup done including elevated white count and chest x-ray with possible pneumonia right lower lobe and started on IV antibiotics for possible pharyngitis and pneumonia.  At this point patient also states that her grandson had cough and upper respiratory symptoms recently and that patient herself had some cough and has sore throat.     Vital signs:  Temp:  [97.3 °F (36.3 °C)-98.4 °F (36.9 °C)] 97.8 °F (36.6 °C)  Pulse:  [] 75  Resp:  [15-31] 31  BP: (100-153)/() 109/61  SpO2:  [89 %-100 %] 89 %     Physical Exam:    General: No acute distress.   HEENT: Moist mucous membranes.  Tender anterior cervical lymphadenopathy, pharyngeal erythema  Neck: Right-sided neck right heart cath access site in dressing and dressing looks dry.  Respiratory: Clear to auscultation bilaterally.  No wheezes. No rhonchi.  Cardiovascular: S1, S2.  Regular rate and rhythm.    Abdomen: Soft, nontender, nondistended.  Positive bowel sounds.   Neurologic: Awake alert oriented, no focal neurological deficits.  Musculoskeletal: Full range of motion of  all extremities.  No pedal edema or calf tenderness  Psychiatric: Appropriate mood and affect.   Assessment & Plan:  Admitted for observation by cardiology status post cardiac angiogram right heart cath And status post attempted SRAVANTHI-managed by cardiology     #Transient dysphagia and sore throat possibly due to acute pharyngitis with anterior cervical tender lymphadenopathy  #Right lower lobe pneumonia seen on chest x-ray  #Leukocytosis due to above  Started on IV antibiotics  Patient has no crepitus on exam.  No pharyngeal erythema.  Denies any heartburns.  White count increasing today to 20 with elevated neutrophil count also.  Continue IV antibiotics and follow CBC in a.m.  Procalcitonin elevated to 11.21  Blood culture and throat culture and sputum culture ordered  Respiratory panel flu panel negative  Pulmonary consult  #Hypertension  Follow-up blood pressure  Patient on Coreg at home which we will plan to continue  #Atrial fibrillation  Monitor on telemetry  On chart review patient on Coreg, digoxin, warfarin at home  #Acute on chronic preserved ejection fraction CHF, chronic valvular heart disease with severe mitral regurgitation and tricuspid regurgitation  Cardiology following  Cardiac medications per cardiology.  On chart review patient on Coreg, lisinopril, digoxin  Chest x-ray done on 7/9/2025 with cardiomegaly with mild pulmonary vascular congestion and increased interstitial opacity in lower lungs concerning for CHF with pulmonary edema.  Also with patchy consolidation right lower lobe with superimposed pneumonia not excluded.  Status post IV Bumex per cardiology given on 7/9/2025  IV Lasix 80 mg IV daily started by cardiology on 7/9/2025  Daily weight  I/O chart  CHF protocol  #CKD stage III  this was discussed with patient and patient's daughter at bedside and looked at old labs in Care Everywhere from 2021 and 2024 and reviewed this with patient and daughter  Creatinine increasing with diuresis, will  also have nephrology on consult     Discussed with patient, CHAPARRITA Singh MD           7/12    Cardiology     Reports intermittent throat pain which is slowly getting better, dysphagia also improving.     Objective:  /50 (BP Location: Left arm)   Pulse 103   Temp 98 °F (36.7 °C) (Oral)   Resp (!) 40              Recent Labs   Lab 07/10/25  1810 07/11/25  0636 07/12/25  0633   * 117* 116*   BUN 25* 33* 51*   CREATSERUM 2.01* 2.45* 3.23*   EGFRCR 24* 19* 14*   CA 9.9 9.7 9.6    140 140   K 4.4 4.5 4.6    101 102   CO2 33.0* 31.0 27.0            Recent Labs   Lab 07/10/25  0627 07/10/25  1810 07/11/25  0636   RBC 4.71 4.71 4.65   HGB 12.9 12.8 12.6   HCT 41.9 41.9 40.6   MCV 89.0 89.0 87.3   MCH 27.4 27.2 27.1   MCHC 30.8* 30.5* 31.0   RDW 15.0 15.1 15.2   NEPRELIM 17.77* 16.71* 17.98*   WBC 20.0* 18.5* 19.8*   .0 383.0 360.0            No results for input(s): \"TROP\", \"TROPHS\", \"CK\" in the last 168 hours.     Review of Systems   Constitutional: Negative.   HENT:  Positive for sore throat.    Cardiovascular:  Positive for irregular heartbeat.       Physical Exam:    Gen: alert, oriented x 3, NAD  Heent: pupils equal, reactive. Mucous membranes moist.   Neck: no jvd  Cardiac: irregular rate and rhythm, normal S1,S2, no murmur, gallop or rub   Lungs: CTA  Abd: soft, NT/ND +bs  Ext: no edema  Skin: Warm, dry  Neuro: No focal deficits    Assessment:  Acute on chronic HFpEF, stage C, NYHA 3-suspect secondary to valvular heart disease   CXR showed pulmonary edema   proBNP 5,503  Echo 6/3/25 LVEF 55-60%   S/P RHC 7/9/25 RA 16, PA 62/29, PCWP 29 with V waves CI 2.1  Diuresed with IV lasix, now on oral torsemide-being held with worsening renal function   GDMT: carvedilol, lisinopril   Valvular heart disease: sev MR, TR  S/P RHC 7/9 showed elevated filling pressures s/p IV diuresis   Plan for op structural heart evaluation   Permanent a-fib, rates mod controlled with bb  On warfarin for  stroke prevention, INRs therapeutic    HTN-well controlled  YOEL/CKD3-crt trending up at 3.23 today, holding off diuretics, renal following appreciate the recs    Dysphagia-improved, management per GI      Plan:  Continue holding diuretics with worsening renal function, encourage oral intake, will monitor renal function daily prior resuming diuretics, renal following, appreciate the recs.  Continue carvedilol, will hold lisinopril with YOEL and soft Bps.  Continue sildenafil.  Continue warfarin-per pharmacy.   Continue abx per primary.        Plan of care discussed with patient, RN.     Aleena Aparicio, ELIAS  7/12/2025  9:50 AM  [phone]        Physician addendum:   I have discussed with APN, agree with note, MDM per me     Acute on chronic HFpEF  Valvular heart disease with severe MR and TR  Permanent A-fib  Hypertension  YOEL on CKD     Plan:  Continue holding diuretics with worsening renal function courage oral intake will monitor closely  Continue carvedilol hold lisinopril with YOEL  Continue sildenafil  Continue Coumadin     L2     Hay Aguirre MD  [facility]      Hospitalist Progress Note     Neck swelling and sore throat improving.  Bringing up a lot of phlegm today .  Remains on 2 L oxygen by nasal cannula.  Afebrile today.          Vital signs:  Temp:  [97 °F (36.1 °C)-98.6 °F (37 °C)] 97.8 °F (36.6 °C)  Pulse:  [] 90  Resp:  [17-40] 23  BP: ()/(45-76) 102/64  SpO2:  [91 %-100 %] 96 %     General: No acute distress.   HEENT: Moist mucous membranes.  Tender anterior cervical lymphadenopathy, pharyngeal erythema improving significantly  Neck: Right-sided neck right heart cath access site in dressing and dressing looks dry.    Assessment & Plan:  Admitted for observation by cardiology status post cardiac angiogram right heart cath And status post attempted SRAVANTHI-managed by cardiology     #Transient dysphagia and sore throat possibly due to acute pharyngitis with anterior cervical tender  lymphadenopathy  #Right lower lobe pneumonia seen on chest x-ray  #Leukocytosis due to above  Continue on IV antibiotics  Patient has no crepitus on exam.  No pharyngeal erythema.  Denies any heartburns.  White count increasing today to 20 with elevated neutrophil count also.  Continue IV antibiotics and follow CBC in a.m.  Procalcitonin elevated to 11.21 on admission  Blood culture and throat culture and sputum culture ordered  Respiratory panel flu panel negative  Pulmonary consult  #Hypertension  Follow-up blood pressure  Patient on Coreg at home which we will plan to continue  #Atrial fibrillation  Monitor on telemetry  On chart review patient on Coreg, digoxin  Hold home warfarin as INR 3.09 on 7/11/2025, follow INR daily  #Acute on chronic preserved ejection fraction CHF, chronic valvular heart disease with severe mitral regurgitation and tricuspid regurgitation  Cardiology following  Cardiac medications per cardiology.  On chart review patient on Coreg, lisinopril, digoxin  Chest x-ray done on 7/9/2025 with cardiomegaly with mild pulmonary vascular congestion and increased interstitial opacity in lower lungs concerning for CHF with pulmonary edema.  Also with patchy consolidation right lower lobe with superimposed pneumonia not excluded.  Status post IV Bumex per cardiology given on 7/9/2025  Status post IV Lasix 80 mg IV daily started by cardiology on 7/9/2025 which was held from 7/11/2025.  Last dose of IV Lasix was on evening of 7/10/2025.  Patient received torsemide on 7/11/2025, which was held on 7/12/2025 as creatinine increasing.  Daily weight  I/O chart  CHF protocol  # Acute kidney injury on CKD stage III  this was discussed with patient and patient's daughter at bedside and looked at old labs in Care Everywhere from 2021 and 2024 and reviewed this with patient and daughter  Creatinine increasing, nephrology on consult with holding all diuretics and lisinopril at this time.  Follow BMP in  a.m.  #Diarrhea possibly related to the Gastrografin patient received for the x-ray upper GI esophagus double contrast study  Checked stool C. difficile which came back negative  Symptomatic management  Probiotic while on antibiotics      discussed with patient daughter at bedside     Discussed with patient  Discussed with RN-send sputum culture           Cammy Singh MD          7/13    Cardiology       Lab 07/11/25  0636 07/12/25  0633 07/13/25  0747   * 116* 123*   BUN 33* 51* 73*   CREATSERUM 2.45* 3.23* 3.44*   EGFRCR 19* 14* 13*   CA 9.7 9.6 9.6    140 140   K 4.5 4.6 4.2    102 99   CO2 31.0 27.0 34.0*            Recent Labs   Lab 07/11/25  0636 07/12/25  1552 07/13/25  0747   RBC 4.65 4.45 4.38   HGB 12.6 12.3 11.9*   HCT 40.6 38.8 36.7   MCV 87.3 87.2 83.8   MCH 27.1 27.6 27.2   MCHC 31.0 31.7 32.4   RDW 15.2 15.3 15.0   NEPRELIM 17.98* 17.09* 15.29*   WBC 19.8* 18.9* 17.3*   .0 403.0 385.0       Constitutional: Negative.   HENT:  Positive for sore throat.    Cardiovascular:  Positive for irregular heartbeat.   Respiratory: Negative.           Assessment:  Acute on chronic HFpEF, stage C, NYHA 3-suspect secondary to valvular heart disease   CXR showed pulmonary edema   proBNP 5,503  Echo 6/3/25 LVEF 55-60%   S/P RHC 7/9/25 RA 16, PA 62/29, PCWP 29 with V waves CI 2.1  Diuresed with IV lasix, now on oral torsemide-being held with worsening renal function   GDMT: carvedilol, lisinopril   Valvular heart disease: sev MR, TR  S/P RHC 7/9 showed elevated filling pressures s/p IV diuresis   Plan for op structural heart evaluation   Permanent a-fib, rates mod controlled with bb  On warfarin for stroke prevention, INRs therapeutic    HTN-well controlled  YOEL/CKD3-crt trending up at 3. 44 today, holding off diuretics, renal following appreciate the recs    Dysphagia-improved, management per GI      Plan:  Continue holding diuretics with YOEL-renal following, appreciate the recs.  Continue  carvedilol, sildenafil.  Holding lisinopril with YOEL.  Continue warfarin -pharmacy managing.  Continue abx per primary.   Plan for op structural heart eval for sev MR,TR.      Plan of care discussed with patient, RN.     ELIAS Schmid  7/13/2025  10:05 AM  [phone]          Physician addendum:  I have discussed with APN, medical decision making per myself  Agree with note as above     Acute on chronic HFpEF-status post diuresis now euvolemic  CKD complicated by YOEL with an uptrending creatinine secondary to suspected overdiuresis possible ATN in the setting of procedures and labile blood pressures  Permanent A-fib  Valvular heart disease severe MR and TR     Plan:  Continue holding diuretics per nephrology  Continue carvedilol and sildenafil  Holding lisinopril in setting of YOEL  Continue Coumadin     L2     Hay Aguirre MD  [facility]   7/14      Hospitalist Progress Note        Vital signs:  Temp:  [97.5 °F (36.4 °C)-98.5 °F (36.9 °C)] 97.8 °F (36.6 °C)  Pulse:  [69-96] 84  Resp:  [17-35] 35  BP: (103-122)/(50-65) 118/57  SpO2:  [93 %-98 %] 98 %    Assessment & Plan:  Admitted for observation by cardiology status post cardiac angiogram right heart cath And status post attempted SRAVANTHI-managed by cardiology     #Transient dysphagia and sore throat possibly due to acute pharyngitis with anterior cervical tender lymphadenopathy  #Right lower lobe pneumonia seen on chest x-ray  #Leukocytosis due to above  Continue on IV antibiotics  Patient has no crepitus on exam.  No pharyngeal erythema.  Denies any heartburns.  White count increasing today to 20 with elevated neutrophil count also.  Continue IV antibiotics and follow CBC in a.m.  Procalcitonin elevated to 11.21 on admission  Blood culture and throat culture and sputum culture ordered  Respiratory panel flu panel negative  Pulmonary consult  #Hypertension  Follow-up blood pressure  Patient on Coreg at home which we will plan to continue  #Atrial  fibrillation  Monitor on telemetry  On chart review patient on Coreg, digoxin  Hold home warfarin since 7/11/2025 due to supratherapeutic INR, follow INR daily  #Acute on chronic preserved ejection fraction CHF, chronic valvular heart disease with severe mitral regurgitation and tricuspid regurgitation  Cardiology following  Cardiac medications per cardiology.  On chart review patient on Coreg, lisinopril, digoxin  Chest x-ray done on 7/9/2025 with cardiomegaly with mild pulmonary vascular congestion and increased interstitial opacity in lower lungs concerning for CHF with pulmonary edema.  Also with patchy consolidation right lower lobe with superimposed pneumonia not excluded.  Status post IV Bumex per cardiology given on 7/9/2025  Status post IV Lasix 80 mg IV daily started by cardiology on 7/9/2025 which was held from 7/11/2025.  Last dose of IV Lasix was on evening of 7/10/2025.  Patient received torsemide on 7/11/2025, which was held on 7/12/2025 as creatinine increasing.  Daily weight  I/O chart  CHF protocol  # Acute kidney injury on CKD stage III  this was discussed with patient and patient's daughter at bedside and looked at old labs in [facility] Everywhere from 2021 and 2024 and reviewed this with patient and daughter  Creatinine increasing, nephrology on consult with holding all diuretics and lisinopril at this time.  Follow BMP in a.m.  #Diarrhea possibly related to the Gastrografin patient received for the x-ray upper GI esophagus double contrast study  Checked stool C. difficile which came back negative  Symptomatic management  Probiotic while on antibiotics      discussed with patient      Discussed with patient  Discussed with CHAPARRITA Singh MD          MEDICATIONS ADMINISTERED IN LAST 1 DAY:  Administration History                        carvedilol (Coreg) tab 12.5 mg  Dose: 12.5 mg  Freq: 2 times daily with meals Route: OR  Start: 07/11/25 1800 End: 07/15/25 1932   Admin Instructions:    Hold for MAP < 65 and or HR < 60         (1800 NS)-Not Given [C]      0853 SH-Given     1736 SH-Given      0829 HK-Given     1708 HK-Given      0831 AW-Given     1702 CD-Held by provider [C]     1800 AW-Hold      0800 CD     1932 AD-Unheld by provider     1932-D/C'd      carvedilol (Coreg) tab 6.25 mg  Dose: 6.25 mg  Freq: 2 times daily with meals Route: OR  Start: 07/09/25 1800 End: 07/11/25 0949       1713 JJ-Given      0818 NS-Given     1825 NS-Given      0921 NS-Given     0949-D/C'd          cefTRIAXone (Rocephin) 2 g in sodium chloride 0.9% 100mL IVPB-HAYDE  Dose: 2 g  Freq: Every 24 hours Route: IV  Last Dose: 2 g (07/14/25 1012)  Start: 07/10/25 0900 End: 07/14/25 1042   Admin Instructions:   (Pharmacist may adjust total duration to 5 days if prior doses received.)  Ceftriaxone must NOT be administered simultaneously with calcium containing IV solutions. Includes Y-site as well.  In patients other than neonates ceftriaxone and calcium containing products may administered sequentially, provided the line is flushed in between administrations.   Order specific questions:           1113 NS-New Bag      0922 NS-New Bag      0855 SH-New Bag      0840 HK-New Bag      1012 AW-New Bag               digoxin (Lanoxin) tab 125 mcg  Dose: 125 mcg  Freq: Daily Route: OR  Start: 07/10/25 0930 End: 07/15/25 1932   Admin Instructions:   Hold for HR less than 60 and notify physician.        0818 NS-Given      0930 NS-Given      0853 SH-Given      0829 HK-Given      0831 AW-Given     1702 CD-Held by provider [C]      0930 CD     1932 AD-Unheld by provider     1932-D/C'd      doxycycline (Vibramycin) 100 mg in sodium chloride 0.9% 100mL IVPB-[place]  Dose: 100 mg  Freq: Every 12 hours Route: IV  Last Dose: 100 mg (07/14/25 1135)  Start: 07/10/25 1000 End: 07/14/25 1337   Admin Instructions:   (Pharmacist may adjust total duration to 5 days if prior doses received.)   Order specific questions:           1310 NS-New Bag     5562  DC-New Bag      1032 NS-New Bag     2048 VW-New Bag      1028 SH-New Bag     2057 RL-New Bag      1046 HK-New Bag     2206 RL-New Bag      1135 AW-New Bag [C]     1337-D/C'd       fentaNYL (Sublimaze) 50 mcg/mL injection  Start: 07/09/25 1114 End: 07/09/25 1114   Admin Instructions:   Sheila Morris: cabinet override       1203 MK-Given [C]              fluconazole in sodium chloride 0.9% (Diflucan) 200 mg/100mL IVPB premix 200 mg  Dose: 200 mg  Freq: Every 24 hours Route: IV  Last Dose: 200 mg (07/15/25 1543)  Start: 07/15/25 1400 End: 07/15/25 1932   Admin Instructions:   CAUTION: REPRODUCTIVE RISK   Order specific questions:                1543 AW-New Bag [C]     1932-D/C'd          furosemide (Lasix) 10 mg/mL injection 80 mg  Dose: 80 mg  Freq: 2 times daily (diuretic) Route: IV  Start: 07/09/25 1700 End: 07/11/25 0645       1619 JJ-Given      0817 NS-Given     1825 NS-Given      0644 AS-Held by provider [C]     0645 AS-Unheld by provider     0645-D/C'd          guaiFENesin ER (Mucinex) 12 hr tab 600 mg  Dose: 600 mg  Freq: 2 times daily Route: OR  Start: 07/13/25 0900 End: 07/15/25 1932   Admin Instructions:   Do not crush           0830 HK-Given     2150 RL-Given      0831 AW-Given     1702 CD-Held by provider [C]     2100 CD             linezolid (Zyvox) 600 mg/300mL IVPB premix 600 mg  Dose: 600 mg  Freq: Every 12 hours Route: IV  Last Dose: 600 mg (07/15/25 0849)  Start: 07/14/25 2100 End: 07/15/25 1138   Admin Instructions:   maximum dose 600mg   Order specific questions:               2205 RL-New Bag      0849 AW-New Bag     1138-D/C'd          midazolam (Versed) 2 MG/2ML injection  Start: 07/09/25 1114 End: 07/09/25 1114   Admin Instructions:   Sheila Morris: cabinet override       1203 MK-Given [C]                   piperacillin-tazobactam (Zosyn) 3.375g in D5W 100mL IVPB-[facility]AYDE  Dose: 3.375 g  Freq: Every 12 hours Route: IV  Last Dose: 3.375 g (07/15/25 1431)  Start: 07/14/25 1500 End: 07/15/25 1932    Admin Instructions:   Zosyn and Zyvox are compatible Y-site   Order specific questions:               1557 AW-New Bag      0356 RL-New Bag     1431 AW-New Bag     1932-D/C'd      sildenafil (Revatio) tab 10 mg  Dose: 10 mg  Freq: 3 times daily Route: OR  Start: 07/11/25 1600 End: 07/15/25 1932   Admin Instructions:   Hold for sbp  MAP < or = 65         1656 NS-Given     2200 VW-Given      0853 SH-Given     1736 SH-Given     2055 RL-Given      0824 HK-Given     1708 HK-Given     2155 RL-Given      0831 AW-Given     (1600 AW)-Not Given     1702 CD-Held by provider [C]     2100 CD                                            Or   morphINE PF 2 MG/ML injection 1 mg  Dose: 1 mg  Freq: Every 2 hour PRN Route: IV  PRN Reason: moderate pain  Start: 07/15/25 1222 End: 07/15/25 1932   Admin Instructions:   Use PRN reason as a guide and follow range order policy. If oral pain meds are ordered and patient can tolerate oral intake, start with PRN oral pain medications first.             1234 AW-Given          1432 AW-Given     1932-D/C'd      Or   morphINE PF 2 MG/ML injection 2 mg  Dose: 2 mg  Freq: Every 2 hour PRN Route: IV  PRN Reason: severe pain  Start: 07/15/25 1222 End: 07/15/25 1932   Admin Instructions:   Use PRN reason as a guide and follow range order policy. If oral pain meds are ordered and patient can tolerate oral intake, start with PRN oral pain medications first.                  (1421 AW)-Not Given [C]          1932-D/C'd      phenol (Chloraseptic) 1.4 % oral liquid spray  Freq: Every 2 hour PRN Route: OR  PRN Reason: sore throat  Start: 07/11/25 2125 End: 07/14/25 1703   Admin Instructions:   Up to 5 sprays onto throat or affected area; keep in place for 15 seconds, then expectorate         2200 VW-Given      1030 SH-Given     2049 RL-Given       1703-D/C'd       traMADol (Ultram) tab 50 mg  Dose: 50 mg  Freq: Every 6 hours PRN Route: OR  PRN Reason: moderate pain  Start: 07/09/25 1704 End: 07/14/25 1703    Admin Instructions:   Max dose 400 mg/day       1712 JJ-Given      1107 NS-Given     2047 GARETH-Return to Cabinet [C]         1703-D/C'd                     Vitals (last day) before discharge       Date/Time Temp Pulse Resp BP SpO2 Weight O2 Device O2 Flow Rate (L/min) [facility]    07/15/25 1605 97.5 °F (36.4 °C) 97 19 111/56 91 % -- Nasal cannula 2 L/min     07/15/25 1300 -- -- 24 -- -- -- -- -- AW    07/15/25 1232 -- -- -- -- -- -- Nasal cannula --     07/15/25 1232 97.5 °F (36.4 °C) 71 36 132/91 96 % -- -- -- AW    07/15/25 0746 98.7 °F (37.1 °C) 75 16 127/80 95 % -- Nasal cannula 2 L/min     07/15/25 0542 98.6 °F (37 °C) 78 23 114/79 92 % 187 lb 13.3 oz (85.2 kg) Nasal cannula 2 L/min     07/14/25 2339 -- 95 23 127/63 95 % -- Nasal cannula 2 L/min     07/14/25 2335 98.1 °F (36.7 °C) 88 23 139/66 94 % -- Nasal cannula 2 L/min     07/14/25 2300 -- -- 23 -- -- -- -- --     07/14/25 1914 97.8 °F (36.6 °C) 62 23 116/69 94 % -- Nasal cannula 1 L/min     07/14/25 1700 97.6 °F (36.4 °C) -- 23 -- -- -- Nasal cannula 1 L/min RB    07/14/25 1200 97.6 °F (36.4 °C) 62 22 109/56 92 % -- Nasal cannula 1 L/min CLAIRE    07/14/25 0831 -- 81 -- -- -- -- -- -- RADHA    07/14/25 0831 98.1 °F (36.7 °C) -- 24 -- -- -- Nasal cannula 2 L/min RB    07/14/25 0722 -- -- -- -- -- -- Nasal cannula 2 L/min JR    07/14/25 0544 98 °F (36.7 °C) 70 27 111/58 98 % 188 lb 4.4 oz (85.4 kg) Nasal cannula 2 L/min [facility]           Latest Reference Range & Units Most Recent   WBC 4.0 - 11.0 x10(3) uL 15.6 (H)  7/15/25 10:40   Hemoglobin 12.0 - 16.0 g/dL 12.9  7/15/25 10:40   Hematocrit 35.0 - 48.0 % 40.3  7/15/25 10:40   Platelet Count 150.0 - 450.0 10(3)uL 403.0  7/15/25 10:40   RBC 3.80 - 5.30 x10(6)uL 4.80  7/15/25 10:40   MCH 26.0 - 34.0 pg 26.9  7/15/25 10:40   MCHC 31.0 - 37.0 g/dL 32.0  7/15/25 10:40   MCV 80.0 - 100.0 fL 84.0  7/15/25 10:40   RDW % 14.8  7/15/25 10:40   Prelim Neutrophil Abs 1.50 - 7.70 x10 (3) uL 12.49 (H)  7/15/25 10:40    Neutrophils Absolute 1.50 - 7.70 x10(3) uL 15.29 (H)  7/13/25 07:47   Lymphocytes Absolute 1.00 - 4.00 x10(3) uL 0.51 (L)  7/13/25 07:47   Monocytes Absolute 0.10 - 1.00 x10(3) uL 1.30 (H)  7/13/25 07:47

## 2025-07-21 NOTE — PAYOR COMM NOTE
CONTINUED STAY REVIEW    Payor: [facility] MEDICARE ADV PPO  Subscriber #:  [id number]  Authorization Number: [id number]    Admit date: 7/9/25  Admit time:  3:40 PM    REVIEW DOCUMENTATION:        7/14               Thoracic Surgery Consult Note     Reason for Consultation: mediastinal abscess       Subjective:      Chief Complaint: \"My neck is sore\"      History of Present Illness:   Ms. Schwab is a 79 year old female with HFpEF and afib on warfarin presenting after SRAVANTHI attempt for severe MR with mediastinal abscess.      Patient presented on 7/9/25 for scheduled SRAVANTHI and right heart cath. They did not complete the SRAVANTHI due to inability to pass the probe into the esophagus. Following the procedure, she reported new onset throat pain, dysphagia, and worsening shortness of breath. UGI from 7/10 was unremarkable. CXR showed possible pneumonia so pulmonology was consulted and a CT was ordered. She is able to handle her secretions and was tolerating soft foods. Currently NPO. No chest pain. She reports ongoing neck soreness with movement. She has chronic shortness of breath and it feels slightly worse than it did before the procedure. Currently on 2L of supplemental O2. Dry cough. She feels the right side of her neck is more swollen than the left.      PMH includes severe MR and TR, permanent afib on coumadin, HTN, HLD, CKD stage III, and right breast partial mastectomy 2024. Right heart cath from 7/9/25 showed PA pressure 62/29 (mean 43mmHg). Cardiac echo from 9/2024 showed EF 58%. No prior thoracic surgeries. Former smoker.      Objective:                  Vital Signs:  /56 (BP Location: Right arm)   Pulse 62   Temp 97.6 °F (36.4 °C) (Oral)   Resp 22         Review of Data:   CT chest 7/14/25  FINDINGS:    LUNGS: Examination is limited due to lack of intravenous contrast, however there is a large gas and fluid collection in the superior mediastinum that is compressing the esophagus. It is unclear if there is  communication with the esophagus. This  collection measures approximately 9.1 x 6.3 cm in the axial plane. This is approximately 7.1 cm craniocaudally. This is mostly in the superior right side of the mediastinum and has significant mass effect with atelectasis on the right lung. There is  associated significant subcutaneous air extending into the hypopharyngeal area and throughout the sublingual and submandibular area. This is only partially visualized on this exam. Small right pleural effusion is noted.    VASCULATURE: Prominent size main pulmonary artery measures approximately 4.8 cm.    THORACIC AORTA:  Unremerkable within the limits of a non contrast study.    NIDHI:  No mass or adenopathy.    MEDIASTINUM: Difficult to delineate with large gas and fluid collection.    CARDIAC: Enlarged heart.    PLEURA: Small right pleural effusion.    CHEST WALL:  No mass or axillary adenopathy.    LIMITED ABDOMEN:  Limited images of the upper abdomen are unremarkable.    BONES:  No bony lesion or fracture.      Impression   Conclusion:      1. There is a large gas and fluid collection in the superior right side of the mediastinum. This has mass effect on the adjacent esophagus. Is unclear if there is any communication with the esophagus itself. This may represent an infectious collection.  This is of unknown etiology. There is associated significant gas extending through the thoracic inlet into the neck. A postcontrast examination would be helpful.    2. Markedly enlarged heart is noted.    3. Small right pleural effusion.    4. A pneumothorax is not identified.         Assessment/Plan:      Ms. Schwab is a 79 year old female with CKD, HFpEF, and afib on warfarin presenting after SRAVANTHI attempt with mediastinal abscess. Patient currently stable. VSS. WBC 17.3. INR 4.40.      Imaging reviewed and patient appears to have a mediastinal abscess with gas extending up into the submandibular area concerning for pharyngeal perforation.  Discussed that she will need thoracic surgery to clear the infection in her mediastinum. In addition, we recommend ENT involvement for source control given the concern for perforation. Surgery will be higher risk due to her heart disease. Discussed with ENT at [facility] and they recommend transfer to another facility for higher level of care. Plan to transfer to [facility] pending bed availability. Patient expressed understanding and agrees with the plan.      -NPO. IVF.  -IV Abx.   -Reverse coumadin per cardiology   -Plan to transfer to [facility] for further surgical management with ENT and Thoracic surgery.      Patient seen and discussed with Dr. Berry.      Adriana Iglesias PA-C  Thoracic Surgery     Patient seen and examined. I agree with above assessment and plan.     Patient with Neck pain and odynophagia since aborted SRAVANTHI.  Denies chest pain. Neck TTP up to level of angle of jaw. Decreased neck ROM.  Scan consistent with perforation and mediastinitis.  Given hx of SRAVANTHI in which the scope could not be passed and gas up to chin level with tenderness above the level of the pharynx, I feel that her perforation is pharyngeal.  An UGI did not show extrav lower in the esophagus.  The mediastinal abscess is likely descended from this.  I feel that she will need mediastinal drainage. I spoke with ENT about neck exploration.  THis sort of exploration and repair is outside of their scope of practice.  The cardiology service is also uncomfortable with post-op care of a patient with her level of heart disease.  Both ENT and Cardiology are requesting transfer. Ms. Schwab is quite stable at this time.  Given this, I will defer mediastinal drainage as this would likely require anesthesia and she would not likely be extubated prior to transfer for neck drainage. With the condition of her heart, consolidation into one trip to the OR is preferred. Should her clinical condition deteriorate before transfer, I would plan on  taking her to OR for mediastinal drainage procedure. I have met with Ms. Schwab multiple times and spoke with family regarding this plan and they are comfortable with it.     Henry Berry MD  Thoracic Surgery            Hospitalist Progress Note     tive:  Discussed with pulmonary, CT surgery and ENT as persisting leukocytosis and neck pain and sore throat and CT chest done showed large gas and fluid collection in superior right side of the mediastinum with mass effect on adjacent esophagus.  Associated gas extending through thoracic inlet into neck.  Marked enlarged heart.  Small right pleural effusion.  No pneumothorax identified.    Afebrile.  On 1 L of oxygen via nasal cannula at this time.       Lab 07/10/25  0627 07/10/25  1810 07/11/25  0636 07/12/25  1552 07/13/25  0747 07/14/25  0619   WBC 20.0* 18.5* 19.8* 18.9* 17.3*  --    HGB 12.9 12.8 12.6 12.3 11.9*  --    MCV 89.0 89.0 87.3 87.2 83.8  --    .0 383.0 360.0 403.0 385.0  --    INR 2.00*  --  3.09* 3.60* 3.58* 4.40*                   Recent Labs   Lab 07/09/25  1958 07/10/25  0627 07/10/25  1810 07/11/25  0636 07/12/25  0633 07/13/25  0747 07/14/25  0619   *   < > 121*   < > 116* 123* 115*   BUN 21   < > 25*   < > 51* 73* 74*   CREATSERUM 1.47*   < > 2.01*   < > 3.23* 3.44* 2.62*   CA 9.7   < > 9.9   < > 9.6 9.6 9.8   ALB 4.5  --  4.7  --   --   --   --       < > 138   < > 140 140 139   K 4.6   < > 4.4   < > 4.6 4.2 4.1      < > 102   < > 102 99 101   CO2 34.0*   < > 33.0*   < > 27.0 34.0* 28.0   ALKPHO 80  --  70  --   --   --   --    AST 27  --  32  --   --   --   --    ALT 10  --  10  --   --   --   --    BILT 1.0  --  0.6  --   --   --   --    TP 8.0  --  8.4*  --   --   --   --      Assessment & Plan:  Admitted for observation by cardiology status post cardiac angiogram right heart cath And status post attempted SRAVANTHI-managed by cardiology     #Transient dysphagia and sore throat possibly due to acute pharyngitis with  anterior cervical tender lymphadenopathy  #Right lower lobe pneumonia seen on chest x-ray  #Leukocytosis due to above  # Large gas and fluid collection in superior right side of the mediastinum with mass effect on the adjacent esophagus, due to 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  No pharyngeal erythema.  Denies any heartburns.  White count increasing today to 20 with elevated neutrophil count also.  Continue IV antibiotics and follow CBC in a.m.  Procalcitonin elevated to 11.21 on admission  Blood culture and throat culture and sputum culture ordered  Respiratory panel flu panel negative  Pulmonary consult appreciated  CT chest done on 7/14/2025 showed large gas and fluid collection in superior right side of the mediastinum with mass effect on adjacent esophagus.  Associated gas extending through thoracic inlet into neck.  Marked enlarged heart.  Small right pleural effusion.  No pneumothorax identified.   Cardiothoracic surgery consulted by pulmonary who also requested ENT consult.  Discussed with CT surgery Dr. Berry, ENT on-call Dr. Alanis, pulmonary Venita Hirsch MD and RN.  CT surgeon plans possible transfer to [facility] under his CT surgery colleagues at [facility] after seen by ENT per CT surgeon Dr. Berry.  #Hypertension  Follow-up blood pressure  Patient on Coreg at home which we will plan to continue  #Atrial fibrillation  Monitor on telemetry  On chart review patient on Coreg, digoxin  Hold home warfarin as INR 3.09 on 7/11/2025, follow INR daily  #Acute on chronic preserved ejection fraction CHF, chronic valvular heart disease with severe mitral regurgitation and tricuspid regurgitation  Cardiology following  Cardiac medications per cardiology.  On chart review patient on Coreg, lisinopril, digoxin  Chest x-ray done on 7/9/2025 with cardiomegaly with mild pulmonary vascular congestion and increased interstitial opacity in  lower lungs concerning for CHF with pulmonary edema.  Also with patchy consolidation right lower lobe with superimposed pneumonia not excluded.  Status post IV Bumex per cardiology given on 7/9/2025  Status post IV Lasix 80 mg IV daily started by cardiology on 7/9/2025 which was held from 7/11/2025.  Last dose of IV Lasix was on evening of 7/10/2025.  Patient received torsemide on 7/11/2025, which was held on 7/12/2025 as creatinine increasing.  Daily weight  I/O chart  CHF protocol  # Acute kidney injury on CKD stage III  this was discussed with patient and patient's daughter at bedside and looked at old labs in Care Everywhere from 2021 and 2024 and reviewed this with patient and daughter  Creatinine increasing, nephrology on consult with holding all diuretics and lisinopril at this time.  Follow BMP in a.m.  #Diarrhea possibly related to the Gastrografin patient received for the x-ray upper GI esophagus double contrast study  Checked stool C. difficile which came back negative  Symptomatic management  Probiotic while on antibiotics     Discussed with patient  Discussed with RN.  Called patient's daughter at below number and updated regarding the patient and the test results and the consultant recommendations etc., patient's daughter wants to directly talk to cardiology team and the surgeon too, updated RN regarding this  Marline Schwab Daughter     [phone]         Cammy Singh MD     Supplementary Documentation:      Quality:  DVT Mechanical Prophylaxis:        DVT Pharmacologic Prophylaxis   Medication   None                 Code Status: Full Code  Bridges: No urinary catheter in place  Bridges Duration (in days):   Central line:    JARED:      Discharge is dependent on: Clinical progress          7/15      INFECTIOUS DISEASE CONSULTATION     Reason for Consultation:  Antibiotic coverage for mediastinal abscess     History of Present Illness:  Lizzy Schwab is a a(n) 79 year old female diagnosed with severe  MR and TR.  She underwent attempted SRAVANTHI on 7/9.  The SRAVANTHI could not be completed due to agitation despite conscious sedation. Subsequently she complained of of neck pain and trouble swallowing.  An UGI did not identify a perforation.  CT performed on 7/14 shows a large gas and fluid collection along the mediastinum.     Blood cultures were collected on 7/10     She had received Ceftriaxone and doxyxycline from 7/10 to 7/14, Pip/calvin from 7/14, and linezolid from 7/14                     ASSESSMENT/PLAN:  1. Mediastinal abscess  Admitted on 7/9 with dysphagia following attempted SRAVANTHI  Concern for esophageal injury  UGI did not identify a peroration     -Thorac surgery following and planning transfer to      Can continue Zosyn and fluconazole  Hold off on zyvox and check nares MRSA PCR         Mauricio Ndiaye MD, MD [facility]          MEDICATIONS ADMINISTERED IN LAST 1 DAY:  Administration History       None            Vitals (last day) before discharge       Date/Time Temp Pulse Resp BP SpO2 Weight O2 Device O2 Flow Rate (L/min) [facility]    07/15/25 1605 97.5 °F (36.4 °C) 97 19 111/56 91 % -- Nasal cannula 2 L/min     07/15/25 1300 -- -- 24 -- -- -- -- --     07/15/25 1232 -- -- -- -- -- -- Nasal cannula --     07/15/25 1232 97.5 °F (36.4 °C) 71 36 132/91 96 % -- -- --     07/15/25 0746 98.7 °F (37.1 °C) 75 16 127/80 95 % -- Nasal cannula 2 L/min     07/15/25 0542 98.6 °F (37 °C) 78 23 114/79 92 % 187 lb 13.3 oz (85.2 kg) Nasal cannula 2 L/min     07/14/25 2339 -- 95 23 127/63 95 % -- Nasal cannula 2 L/min     07/14/25 2335 98.1 °F (36.7 °C) 88 23 139/66 94 % -- Nasal cannula 2 L/min     07/14/25 2300 -- -- 23 -- -- -- -- --     07/14/25 1914 97.8 °F (36.6 °C) 62 23 116/69 94 % -- Nasal cannula 1 L/min GW    07/14/25 1700 97.6 °F (36.4 °C) -- 23 -- -- -- Nasal cannula 1 L/min RB    07/14/25 1200 97.6 °F (36.4 °C) 62 22 109/56 92 % -- Nasal cannula 1 L/min CLAIRE    07/14/25 0831  -- 79 -- -- -- -- -- -- RADHA    07/14/25 0831 98.1 °F (36.7 °C) -- 24 -- -- -- Nasal cannula 2 L/min RB    07/14/25 0722 -- -- -- -- -- -- Nasal cannula 2 L/min JR    07/14/25 0544 98 °F (36.7 °C) 70 27 111/58 98 % 188 lb 4.4 oz (85.4 kg) Nasal cannula 2 L/min VA        Latest Reference Range & Units Most Recent   WBC 4.0 - 11.0 x10(3) uL 15.6 (H)  7/15/25 10:40   Hemoglobin 12.0 - 16.0 g/dL 12.9  7/15/25 10:40   Hematocrit 35.0 - 48.0 % 40.3  7/15/25 10:40   Platelet Count 150.0 - 450.0 10(3)uL 403.0  7/15/25 10:40   RBC 3.80 - 5.30 x10(6)uL 4.80  7/15/25 10:40   MCH 26.0 - 34.0 pg 26.9  7/15/25 10:40   MCHC 31.0 - 37.0 g/dL 32.0  7/15/25 10:40   MCV 80.0 - 100.0 fL 84.0  7/15/25 10:40   RDW % 14.8  7/15/25 10:40   Prelim Neutrophil Abs 1.50 - 7.70 x10 (3) uL 12.49 (H)  7/15/25 10:40   Neutrophils Absolute 1.50 - 7.70 x10(3) uL 15.29 (H)  7/13/25 07:47   Lymphocytes Absolute 1.00 - 4.00 x10(3) uL 0.51 (L)  7/13/25 07:47   Monocytes Absolute 0.10 - 1.00 x10(3) uL 1.30 (H)  7/13/25 07:47      Latest Reference Range & Units Most Recent   Glucose 70 - 99 mg/dL 117 (H)  7/15/25 07:06   Sodium 136 - 145 mmol/L 140  7/15/25 07:06   Potassium 3.5 - 5.1 mmol/L 3.9  7/15/25 07:06   Chloride 98 - 112 mmol/L 101  7/15/25 07:06   Carbon Dioxide, Total 21.0 - 32.0 mmol/L 32.0  7/15/25 07:06   BUN 9 - 23 mg/dL 77 (H)  7/15/25 07:06   CREATININE 0.55 - 1.02 mg/dL 1.99 (H)  7/15/25 07:06   CALCIUM 8.7 - 10.6 mg/dL 9.9  7/15/25 07:06   EGFR >=60 mL/min/1.73m2 25 (L)  7/15/25 07:06   ANION GAP 0 - 18 mmol/L 7  7/15/25 07:06   CALCULATED OSMOLALITY 275 - 295 mOsm/kg 314 (H)  7/15/25 07:06

## 2025-07-21 NOTE — PROGRESS NOTES
Provider Clarification  Additional information on the cause and effect relationship between possible pharyngeal perforation/mediastinal mass and the attempted SRAVANTHI procedure:   The relationship between the possible pharyngeal perforation/mediastinal mass and attempted SRAVANTHI is possible.  This note is part of the patient's medical record.

## 2025-07-21 NOTE — PAYOR COMM NOTE
Pt transferred to [facility]         DISCHARGE REVIEW    Payor: [employer] MEDICARE ADV PPO  Subscriber #:  [id number]  Authorization Number: [id number]    Admit date: 7/9/25  Admit time:   3:40 PM  Discharge Date: 7/15/2025  5:31 PM     Admitting Physician: Harpal Vo MD  Attending Physician:  No att. providers found  Primary Care Physician: Harpal Vo MD       Discharge Summary Notes    No notes of this type exist for this encounter.         REVIEWER COMMENTS

## (undated) NOTE — LETTER
[facility] [address], [address]  Consent for Procedure/Sedation    Date: 04/22/21    Time: 0900      1.  I authorize the performance upon Delmer Lerner the following:cardiac catheterization, left ventricular cineangiography, b Signature of person authorized                                     Relationship to  to consent for patient: _________________________ patient: ___________________    Witness: _______________________________ Date: _____________________    Printed: 4/22/202